# Patient Record
Sex: MALE | Race: BLACK OR AFRICAN AMERICAN | NOT HISPANIC OR LATINO | Employment: OTHER | URBAN - METROPOLITAN AREA
[De-identification: names, ages, dates, MRNs, and addresses within clinical notes are randomized per-mention and may not be internally consistent; named-entity substitution may affect disease eponyms.]

---

## 2017-01-24 ENCOUNTER — CLINICAL SUPPORT (OUTPATIENT)
Dept: SMOKING CESSATION | Facility: CLINIC | Age: 77
End: 2017-01-24
Payer: COMMERCIAL

## 2017-01-24 DIAGNOSIS — F17.200 NICOTINE DEPENDENCE: Primary | ICD-10-CM

## 2017-01-24 PROCEDURE — 99404 PREV MED CNSL INDIV APPRX 60: CPT | Mod: S$GLB,,,

## 2017-01-24 NOTE — PROGRESS NOTES
Pt seen at intake today. He currently smokes less than 3 cigs/day, but he hasn't smoked a cigarette in the past week .Patient is on Wellbutrin  mg BID per his primary MD, since December. Patient wishes to come to weekly counseling because he is anxious to stay quit.  Pt's exhaled carbon monoxide level was  0  ppm as per Smokerlyzer. Went over 15 minute wait and distracting techniques with patient.  Provided Mr Rodriguez with cinnamon toothpicks, tanglers and coloring book to help him with distracting.  Congratulated patient on his hard work and his success at quitting.  Please see Tobacco Cessation Intake Form for patient assessment and recommendations.

## 2017-01-27 ENCOUNTER — OFFICE VISIT (OUTPATIENT)
Dept: FAMILY MEDICINE | Facility: CLINIC | Age: 77
End: 2017-01-27
Payer: MEDICARE

## 2017-01-27 VITALS
RESPIRATION RATE: 12 BRPM | TEMPERATURE: 99 F | WEIGHT: 125.69 LBS | SYSTOLIC BLOOD PRESSURE: 210 MMHG | HEART RATE: 56 BPM | DIASTOLIC BLOOD PRESSURE: 86 MMHG | OXYGEN SATURATION: 98 % | BODY MASS INDEX: 19.73 KG/M2 | HEIGHT: 67 IN

## 2017-01-27 DIAGNOSIS — F32.A DEPRESSION, UNSPECIFIED DEPRESSION TYPE: ICD-10-CM

## 2017-01-27 DIAGNOSIS — F17.210 CIGARETTE NICOTINE DEPENDENCE WITHOUT COMPLICATION: ICD-10-CM

## 2017-01-27 DIAGNOSIS — G47.00 INSOMNIA, UNSPECIFIED TYPE: ICD-10-CM

## 2017-01-27 DIAGNOSIS — I70.0 ATHEROSCLEROSIS OF AORTA: ICD-10-CM

## 2017-01-27 DIAGNOSIS — F33.2 MAJOR DEPRESSIVE DISORDER, RECURRENT EPISODE, SEVERE, WITHOUT MENTION OF PSYCHOTIC BEHAVIOR: ICD-10-CM

## 2017-01-27 DIAGNOSIS — J30.9 ALLERGIC RHINITIS, UNSPECIFIED ALLERGIC RHINITIS TRIGGER, UNSPECIFIED RHINITIS SEASONALITY: Primary | ICD-10-CM

## 2017-01-27 PROCEDURE — 1157F ADVNC CARE PLAN IN RCRD: CPT | Mod: S$GLB,,, | Performed by: FAMILY MEDICINE

## 2017-01-27 PROCEDURE — 3077F SYST BP >= 140 MM HG: CPT | Mod: S$GLB,,, | Performed by: FAMILY MEDICINE

## 2017-01-27 PROCEDURE — 1160F RVW MEDS BY RX/DR IN RCRD: CPT | Mod: S$GLB,,, | Performed by: FAMILY MEDICINE

## 2017-01-27 PROCEDURE — 3079F DIAST BP 80-89 MM HG: CPT | Mod: S$GLB,,, | Performed by: FAMILY MEDICINE

## 2017-01-27 PROCEDURE — 99499 UNLISTED E&M SERVICE: CPT | Mod: S$GLB,,, | Performed by: FAMILY MEDICINE

## 2017-01-27 PROCEDURE — 99213 OFFICE O/P EST LOW 20 MIN: CPT | Mod: S$GLB,,, | Performed by: FAMILY MEDICINE

## 2017-01-27 PROCEDURE — 1159F MED LIST DOCD IN RCRD: CPT | Mod: S$GLB,,, | Performed by: FAMILY MEDICINE

## 2017-01-27 PROCEDURE — 99999 PR PBB SHADOW E&M-EST. PATIENT-LVL III: CPT | Mod: PBBFAC,,, | Performed by: FAMILY MEDICINE

## 2017-01-27 RX ORDER — MIRTAZAPINE 45 MG/1
45 TABLET, FILM COATED ORAL NIGHTLY
Qty: 90 TABLET | Refills: 3 | Status: SHIPPED | OUTPATIENT
Start: 2017-01-27 | End: 2017-02-27 | Stop reason: SDUPTHER

## 2017-01-27 RX ORDER — BUPROPION HYDROCHLORIDE 200 MG/1
200 TABLET, EXTENDED RELEASE ORAL 2 TIMES DAILY
Qty: 180 TABLET | Refills: 3 | Status: SHIPPED | OUTPATIENT
Start: 2017-01-27 | End: 2018-02-14 | Stop reason: SDUPTHER

## 2017-01-27 RX ORDER — MIRTAZAPINE 30 MG/1
TABLET, FILM COATED ORAL
COMMUNITY
Start: 2017-01-26 | End: 2017-01-27 | Stop reason: SDUPTHER

## 2017-01-27 NOTE — PROGRESS NOTES
Subjective:       Patient ID: Otis Rodriguez is a 76 y.o. male.    Chief Complaint: Depression (f/u)    HPI:  Here for follow up chronic depression, insomnia and allergies. Chronic watery eyes and runny nose.  Symptoms unchanged. Using Flonase.    Review of Systems   Constitutional: Negative for unexpected weight change.   Psychiatric/Behavioral: Positive for dysphoric mood.       Objective:      Physical Exam   Constitutional: He appears well-developed and well-nourished.   HENT:   Nose: Nose normal.   Eyes: Conjunctivae are normal.   Neurological: He is alert.         Assessment:       1. Allergic rhinitis, unspecified allergic rhinitis trigger, unspecified rhinitis seasonality    2. Atherosclerosis of aorta    3. Major depressive disorder, recurrent episode, severe, without mention of psychotic behavior        Plan:       Allergic rhinitis, unspecified allergic rhinitis trigger, unspecified rhinitis seasonality  Zyrtec or Claritin 10mg 1 tablet daily for allergies    Atherosclerosis of aorta  No chest pain    Major depressive disorder, recurrent episode, severe, without mention of psychotic behavior    mood unchanged.  Increase Wellbutrin  mg twice daily      No Follow-up on file.

## 2017-01-27 NOTE — MR AVS SNAPSHOT
[facility] [address]  [place] [address]  Phone: [phone]  Fax: [phone]                  Otis Rodriguez   2017 3:40 PM   Office Visit    Description:  Male : 1940   Provider:  Selma Miles MD   Department:  [facility]           Reason for Visit     Depression           Diagnoses this Visit        Comments    Allergic rhinitis, unspecified allergic rhinitis trigger, unspecified rhinitis seasonality    -  Primary     Atherosclerosis of aorta         Major depressive disorder, recurrent episode, severe, without mention of psychotic behavior         Insomnia, unspecified type         Depression, unspecified depression type         Cigarette nicotine dependence without complication                To Do List           Future Appointments        Provider Department Dept Phone    2017 2:00 PM Tiff Mullins MD [facility] [phone]      Goals (5 Years of Data)     None       These Medications        Disp Refills Start End    mirtazapine (REMERON) 45 MG tablet 90 tablet 3 2017     Take 1 tablet (45 mg total) by mouth every evening. - Oral    Pharmacy: [facility]/pharmacy #[id number] [facility] [address] [place] [address] Ph #: [phone]       buPROPion (WELLBUTRIN SR) 200 MG TbSR 180 tablet 3 2017     Take 1 tablet (200 mg total) by mouth 2 (two) times daily. - Oral    Pharmacy: [facility]/pharmacy #[id number] [address] Ph #: [phone]         [facility] On [facility]     [facility] On Call Nurse Care Line -  Assistance  Registered nurses in the [facility] On Call Center provide clinical advisement, health education, appointment booking, and other advisory services.  Call for this free service at [phone].             Medications           START taking these NEW medications        Refills    mirtazapine (REMERON) 45 MG tablet 3    Sig: Take 1 tablet (45 mg total) by mouth every evening.     Class: Print    Route: Oral      CHANGE how you are taking these medications     Start Taking Instead of    buPROPion (WELLBUTRIN SR) 200 MG TbSR buPROPion (WELLBUTRIN SR) 150 MG TBSR 12 hr tablet    Dosage:  Take 1 tablet (200 mg total) by mouth 2 (two) times daily. Dosage:  Take 1 tablet daily x 3 days, then 1 tablet twice daily    Reason for Change:  Reorder       STOP taking these medications     quetiapine (SEROQUEL) 100 MG Tab Take 1 tablet (100 mg total) by mouth nightly.           Verify that the below list of medications is an accurate representation of the medications you are currently taking.  If none reported, the list may be blank. If incorrect, please contact your healthcare provider. Carry this list with you in case of emergency.           Current Medications     amlodipine (NORVASC) 5 MG tablet Take 1 tablet (5 mg total) by mouth once daily.    azelastine (OPTIVAR) 0.05 % ophthalmic solution Place 1 drop into both eyes 2 (two) times daily.    buPROPion (WELLBUTRIN SR) 200 MG TbSR Take 1 tablet (200 mg total) by mouth 2 (two) times daily.    cetirizine (ZYRTEC) 10 MG tablet Take 1 tablet (10 mg total) by mouth once daily.    donepezil (ARICEPT) 10 MG tablet Take 1 tablet (10 mg total) by mouth every evening.    fluticasone (FLONASE) 50 mcg/actuation nasal spray 2 sprays by Each Nare route once daily.    hydrALAZINE (APRESOLINE) 25 MG tablet Take 1 tablet (25 mg total) by mouth 3 (three) times daily.    metoprolol succinate (TOPROL-XL) 50 MG 24 hr tablet TAKE 1 TABLET BY MOUTH TWICE A DAY    mirtazapine (REMERON) 45 MG tablet Take 1 tablet (45 mg total) by mouth every evening.    naproxen (NAPROSYN) 500 MG tablet Take 1 tablet (500 mg total) by mouth 2 (two) times daily with meals.    nitroGLYCERIN 0.4 MG/DOSE TL SPRY (NITROLINGUAL) 400 mcg/spray spray Place 2 sprays under the tongue every 5 (five) minutes as needed for Chest pain.    polyethylene glycol (COLYTE) 240-22.72-6.72 -5.84 gram SolR Take  "4,000 mLs (4 L total) by mouth as directed.    polyethylene glycol (COLYTE) 240-22.72-6.72 -5.84 gram SolR Take 4,000 mLs (4 L total) by mouth as directed.    SAW/VIT E/SOD GABBY/LYC/BETA/PYG (PROSTATE HEALTH ORAL) Take by mouth.           Clinical Reference Information           Vital Signs - Last Recorded  Most recent update: 1/27/2017  3:14 PM by Dayna Best MA    BP Pulse Temp Resp Ht Wt    (!) 210/86 (BP Location: Right arm, Patient Position: Sitting, BP Method: Manual) (!) 56 98.8 °F (37.1 °C) (Oral) 12 5' 7" (1.702 m) 57 kg (125 lb 10.6 oz)    SpO2 BMI             98% 19.68 kg/m2         Blood Pressure          Most Recent Value    BP  (!)  210/86      Allergies as of 1/27/2017     No Known Allergies      Immunizations Administered on Date of Encounter - 1/27/2017     None      Instructions    Zyrtec or Claritin 10mg 1 tablet daily for allergies       "

## 2017-01-31 ENCOUNTER — CLINICAL SUPPORT (OUTPATIENT)
Dept: SMOKING CESSATION | Facility: CLINIC | Age: 77
End: 2017-01-31
Payer: COMMERCIAL

## 2017-01-31 DIAGNOSIS — F17.200 NICOTINE DEPENDENCE: Primary | ICD-10-CM

## 2017-01-31 PROCEDURE — 99999 PR PBB SHADOW E&M-EST. PATIENT-LVL I: CPT | Mod: PBBFAC,,,

## 2017-01-31 PROCEDURE — 99402 PREV MED CNSL INDIV APPRX 30: CPT | Mod: S$GLB,,,

## 2017-01-31 NOTE — PROGRESS NOTES
Individual Follow-Up Form    1/31/2017    Quit Date: /    Clinical Status of Patient: Outpatient    Length of Service: 30 minutes    Continuing Medication: yes  Wellbutrin    Other Medications: none     Target Symptoms: Withdrawal and medication side effects. The following were  rated moderate (3) to severe (4) on TCRS:  · Moderate (3): none  · Severe (4): none    Comments: Patient is tobacco free. Reviewed strategies, cues, and triggers. Introduced the negative impact of tobacco on health, the health advantages of discontinuing the use of tobacco,  improved health changes after a quit, withdrawal issues to expect from nicotine and habit, and ways to achieve the goal of a quit .Reviewed coping strategies/habitual behavior/relapse prevention with patient. Exhaled carbon monoxide was 0 per smokerlyzer. Congratulated patient and gave him his quit coin. He stated he wished to return next week. Patient said the [employer] toothpicks helped, he was given another pack.     Diagnosis: F17.200    Next Visit: 2 weeks/

## 2017-01-31 NOTE — Clinical Note
Patient is tobacco free. Reviewed strategies, cues, and triggers. Introduced the negative impact of tobacco on health, the health advantages of discontinuing the use of tobacco,  improved health changes after a quit, withdrawal issues to expect from nicotine and habit, and ways to achieve the goal of a quit .Reviewed coping strategies/habitual behavior/relapse prevention with patient. Exhaled carbon monoxide was 0 per smokerlyzer. Congratulated patient and gave him his quit coin. Patient said the [employer] toothpicks helped, he was given another pack. He stated he wished to return next week.

## 2017-02-27 DIAGNOSIS — G47.00 INSOMNIA: ICD-10-CM

## 2017-02-27 DIAGNOSIS — G47.00 INSOMNIA, UNSPECIFIED TYPE: ICD-10-CM

## 2017-02-27 RX ORDER — MIRTAZAPINE 45 MG/1
45 TABLET, FILM COATED ORAL NIGHTLY
Qty: 90 TABLET | Refills: 1 | Status: SHIPPED | OUTPATIENT
Start: 2017-02-27 | End: 2018-11-26

## 2017-02-27 RX ORDER — MIRTAZAPINE 30 MG/1
TABLET, FILM COATED ORAL
Qty: 30 TABLET | Refills: 9 | OUTPATIENT
Start: 2017-02-27

## 2017-03-27 ENCOUNTER — OFFICE VISIT (OUTPATIENT)
Dept: FAMILY MEDICINE | Facility: CLINIC | Age: 77
End: 2017-03-27
Payer: MEDICARE

## 2017-03-27 VITALS
TEMPERATURE: 98 F | SYSTOLIC BLOOD PRESSURE: 158 MMHG | HEIGHT: 67 IN | OXYGEN SATURATION: 97 % | DIASTOLIC BLOOD PRESSURE: 82 MMHG | HEART RATE: 78 BPM | BODY MASS INDEX: 19.66 KG/M2 | RESPIRATION RATE: 12 BRPM | WEIGHT: 125.25 LBS

## 2017-03-27 DIAGNOSIS — S20.212A CONTUSION OF RIB, LEFT, INITIAL ENCOUNTER: ICD-10-CM

## 2017-03-27 DIAGNOSIS — H66.93 BILATERAL OTITIS MEDIA, UNSPECIFIED CHRONICITY, UNSPECIFIED OTITIS MEDIA TYPE: Primary | ICD-10-CM

## 2017-03-27 PROCEDURE — 99999 PR PBB SHADOW E&M-EST. PATIENT-LVL III: CPT | Mod: PBBFAC,,, | Performed by: FAMILY MEDICINE

## 2017-03-27 PROCEDURE — 1160F RVW MEDS BY RX/DR IN RCRD: CPT | Mod: S$GLB,,, | Performed by: FAMILY MEDICINE

## 2017-03-27 PROCEDURE — 1157F ADVNC CARE PLAN IN RCRD: CPT | Mod: S$GLB,,, | Performed by: FAMILY MEDICINE

## 2017-03-27 PROCEDURE — 99213 OFFICE O/P EST LOW 20 MIN: CPT | Mod: S$GLB,,, | Performed by: FAMILY MEDICINE

## 2017-03-27 PROCEDURE — 3079F DIAST BP 80-89 MM HG: CPT | Mod: S$GLB,,, | Performed by: FAMILY MEDICINE

## 2017-03-27 PROCEDURE — 3077F SYST BP >= 140 MM HG: CPT | Mod: S$GLB,,, | Performed by: FAMILY MEDICINE

## 2017-03-27 PROCEDURE — 1159F MED LIST DOCD IN RCRD: CPT | Mod: S$GLB,,, | Performed by: FAMILY MEDICINE

## 2017-03-27 RX ORDER — AMOXICILLIN 875 MG/1
875 TABLET, FILM COATED ORAL 2 TIMES DAILY
Qty: 20 TABLET | Refills: 0 | Status: SHIPPED | OUTPATIENT
Start: 2017-03-27 | End: 2017-04-06

## 2017-03-27 RX ORDER — QUETIAPINE FUMARATE 50 MG/1
50 TABLET, FILM COATED ORAL NIGHTLY
Refills: 3 | COMMUNITY
Start: 2017-02-27 | End: 2018-06-25

## 2017-03-27 NOTE — PROGRESS NOTES
Subjective:       Patient ID: Otis Rodriguez is a 76 y.o. male.    Chief Complaint: Chest Pain (patient fell a month ago hurt left sided chest and elbow, still with allergies)    HPI:  Reports bilateral ear pain x 2 days. Reports chronic runny nose and watery eyes.  Also reports he slipped and fell while getting out of tub 1 month ago injuryng left side of rib cage.    Review of Systems   Constitutional: Negative for fever.   HENT: Positive for rhinorrhea.    Eyes: Positive for discharge.       Objective:      Physical Exam   Constitutional: He appears well-developed and well-nourished.   HENT:   TM's red bilaterally   Pulmonary/Chest: Effort normal. No respiratory distress.   No tenderness, palpable deformity or bruising of left rib cage         Assessment:       1. Bilateral otitis media, unspecified chronicity, unspecified otitis media type    2. Contusion of rib, left, initial encounter        Plan:       Bilateral otitis media, unspecified chronicity, unspecified otitis media type  -     amoxicillin (AMOXIL) 875 MG tablet; Take 1 tablet (875 mg total) by mouth 2 (two) times daily.  Dispense: 20 tablet; Refill: 0    Contusion of rib, left, initial encounter  Patient reassured          No Follow-up on file.

## 2017-03-27 NOTE — MR AVS SNAPSHOT
[facility]  [address]  [place] [address]  Phone: [phone]  Fax: [phone]                  Otis Rodriguez   3/27/2017 3:00 PM   Office Visit    Description:  Male : 1940   Provider:  Selma Miles MD   Department:  [facility]           Reason for Visit     Chest Pain           Diagnoses this Visit        Comments    Bilateral otitis media, unspecified chronicity, unspecified otitis media type    -  Primary     Contusion of rib, left, initial encounter                To Do List           Goals (5 Years of Data)     None       These Medications        Disp Refills Start End    amoxicillin (AMOXIL) 875 MG tablet 20 tablet 0 3/27/2017 2017    Take 1 tablet (875 mg total) by mouth 2 (two) times daily. - Oral    Pharmacy: [facility]/pharmacy #[id number] - [place], [place]  [address] [facility] #: [phone]         [facility] On Call     [facility] On Call Nurse Care Line -  Assistance  Registered nurses in the [facility] On Call Center provide clinical advisement, health education, appointment booking, and other advisory services.  Call for this free service at [phone].             Medications           START taking these NEW medications        Refills    amoxicillin (AMOXIL) 875 MG tablet 0    Sig: Take 1 tablet (875 mg total) by mouth 2 (two) times daily.    Class: Normal    Route: Oral           Verify that the below list of medications is an accurate representation of the medications you are currently taking.  If none reported, the list may be blank. If incorrect, please contact your healthcare provider. Carry this list with you in case of emergency.           Current Medications     amlodipine (NORVASC) 5 MG tablet Take 1 tablet (5 mg total) by mouth once daily.    amoxicillin (AMOXIL) 875 MG tablet Take 1 tablet (875 mg total) by mouth 2 (two) times daily.    azelastine (OPTIVAR) 0.05 % ophthalmic solution Place 1 drop into both eyes 2  "(two) times daily.    buPROPion (WELLBUTRIN SR) 200 MG TbSR Take 1 tablet (200 mg total) by mouth 2 (two) times daily.    cetirizine (ZYRTEC) 10 MG tablet Take 1 tablet (10 mg total) by mouth once daily.    donepezil (ARICEPT) 10 MG tablet Take 1 tablet (10 mg total) by mouth every evening.    fluticasone (FLONASE) 50 mcg/actuation nasal spray 2 sprays by Each Nare route once daily.    hydrALAZINE (APRESOLINE) 25 MG tablet Take 1 tablet (25 mg total) by mouth 3 (three) times daily.    metoprolol succinate (TOPROL-XL) 50 MG 24 hr tablet TAKE 1 TABLET BY MOUTH TWICE A DAY    mirtazapine (REMERON) 45 MG tablet Take 1 tablet (45 mg total) by mouth every evening.    naproxen (NAPROSYN) 500 MG tablet Take 1 tablet (500 mg total) by mouth 2 (two) times daily with meals.    nitroGLYCERIN 0.4 MG/DOSE TL SPRY (NITROLINGUAL) 400 mcg/spray spray Place 2 sprays under the tongue every 5 (five) minutes as needed for Chest pain.    polyethylene glycol (COLYTE) 240-22.72-6.72 -5.84 gram SolR Take 4,000 mLs (4 L total) by mouth as directed.    polyethylene glycol (COLYTE) 240-22.72-6.72 -5.84 gram SolR Take 4,000 mLs (4 L total) by mouth as directed.    quetiapine (SEROQUEL) 50 MG tablet Take 50 mg by mouth every evening.    SAW/VIT E/SOD GABBY/LYC/BETA/PYG (PROSTATE HEALTH ORAL) Take by mouth.           Clinical Reference Information           Your Vitals Were     BP Pulse Temp Resp Height Weight    158/82 (BP Location: Left arm, Patient Position: Sitting, BP Method: Manual) 78 98.1 °F (36.7 °C) (Oral) 12 5' 7" (1.702 m) 56.8 kg (125 lb 3.5 oz)    SpO2 BMI             97% 19.61 kg/m2         Blood Pressure          Most Recent Value    BP  (!)  158/82      Allergies as of 3/27/2017     No Known Allergies      Immunizations Administered on Date of Encounter - 3/27/2017     None      Language Assistance Services     ATTENTION: Language assistance services are available, free of charge. Please call [phone].      ATENCIÓN: Si wyatt " español, tiene a greenberg disposición servicios gratuitos de asistencia lingüística. Llame al [phone].     CHÚ Ý: N?u b?n nói Ti?ng Vi?t, có các d?ch v? h? tr? ngôn ng? mi?n phí dành cho b?n. G?i s? [phone].         [facility] complies with applicable Federal civil rights laws and does not discriminate on the basis of race, color, national origin, age, disability, or sex.

## 2017-05-02 ENCOUNTER — CLINICAL SUPPORT (OUTPATIENT)
Dept: SMOKING CESSATION | Facility: CLINIC | Age: 77
End: 2017-05-02
Payer: COMMERCIAL

## 2017-05-02 DIAGNOSIS — F17.200 NICOTINE DEPENDENCE: Primary | ICD-10-CM

## 2017-05-02 PROCEDURE — 99407 BEHAV CHNG SMOKING > 10 MIN: CPT | Mod: S$GLB,,,

## 2017-05-02 NOTE — PROGRESS NOTES
Phone follow up today. Patient is tobacco free.  No adverse effects/mental changes noted at this time. Reviewed coping strategies/habitual behavior/relapse prevention with patient.  Will see pt back in office in 2 weeks. Congratulated patient on his  success .

## 2017-05-05 DIAGNOSIS — I25.10 CORONARY ARTERY DISEASE INVOLVING NATIVE CORONARY ARTERY WITHOUT ANGINA PECTORIS, UNSPECIFIED WHETHER NATIVE OR TRANSPLANTED HEART: ICD-10-CM

## 2017-05-05 RX ORDER — METOPROLOL SUCCINATE 50 MG/1
TABLET, EXTENDED RELEASE ORAL
Qty: 60 TABLET | Refills: 6 | Status: SHIPPED | OUTPATIENT
Start: 2017-05-05 | End: 2018-05-25 | Stop reason: SDUPTHER

## 2017-05-24 ENCOUNTER — TELEPHONE (OUTPATIENT)
Dept: SMOKING CESSATION | Facility: CLINIC | Age: 77
End: 2017-05-24

## 2017-08-02 ENCOUNTER — TELEPHONE (OUTPATIENT)
Dept: FAMILY MEDICINE | Facility: CLINIC | Age: 77
End: 2017-08-02

## 2017-08-02 NOTE — TELEPHONE ENCOUNTER
Patient says [phone] is the number and he does not know the names of the Drs.      Patient states he went to a DrRocío Visit in [facility]. A cxr  Showed asbestos. He will call back with the phone number.        ----- Message from India Gutierrez sent at 8/2/2017  8:44 AM CDT -----  Patient states he is calling regards to x-rays. Please call at [phone] thank you!      Patient will sign a release and it needs to be faxed [phone]

## 2017-08-21 NOTE — TELEPHONE ENCOUNTER
We are unable to get a copy I tried it's about a lawsuit ,  he called and is now in the lawsuit as seen on tv.

## 2017-08-30 ENCOUNTER — CLINICAL SUPPORT (OUTPATIENT)
Dept: SMOKING CESSATION | Facility: CLINIC | Age: 77
End: 2017-08-30
Payer: COMMERCIAL

## 2017-08-30 DIAGNOSIS — F17.200 NICOTINE DEPENDENCE: Primary | ICD-10-CM

## 2017-08-30 PROCEDURE — 99407 BEHAV CHNG SMOKING > 10 MIN: CPT | Mod: S$GLB,,,

## 2017-09-12 ENCOUNTER — OFFICE VISIT (OUTPATIENT)
Dept: FAMILY MEDICINE | Facility: CLINIC | Age: 77
End: 2017-09-12
Payer: MEDICARE

## 2017-09-12 VITALS
TEMPERATURE: 98 F | RESPIRATION RATE: 20 BRPM | OXYGEN SATURATION: 99 % | SYSTOLIC BLOOD PRESSURE: 132 MMHG | DIASTOLIC BLOOD PRESSURE: 58 MMHG | BODY MASS INDEX: 19.55 KG/M2 | HEIGHT: 67 IN | HEART RATE: 63 BPM | WEIGHT: 124.56 LBS

## 2017-09-12 DIAGNOSIS — Z23 NEED FOR PNEUMOCOCCAL VACCINATION: ICD-10-CM

## 2017-09-12 DIAGNOSIS — I21.4 NSTEMI (NON-ST ELEVATED MYOCARDIAL INFARCTION): Primary | ICD-10-CM

## 2017-09-12 DIAGNOSIS — I10 BENIGN ESSENTIAL HYPERTENSION: ICD-10-CM

## 2017-09-12 PROCEDURE — 1126F AMNT PAIN NOTED NONE PRSNT: CPT | Mod: S$GLB,,, | Performed by: PHYSICIAN ASSISTANT

## 2017-09-12 PROCEDURE — G0009 ADMIN PNEUMOCOCCAL VACCINE: HCPCS | Mod: S$GLB,,, | Performed by: PHYSICIAN ASSISTANT

## 2017-09-12 PROCEDURE — 3075F SYST BP GE 130 - 139MM HG: CPT | Mod: S$GLB,,, | Performed by: PHYSICIAN ASSISTANT

## 2017-09-12 PROCEDURE — 90732 PPSV23 VACC 2 YRS+ SUBQ/IM: CPT | Mod: S$GLB,,, | Performed by: PHYSICIAN ASSISTANT

## 2017-09-12 PROCEDURE — 99999 PR PBB SHADOW E&M-EST. PATIENT-LVL V: CPT | Mod: PBBFAC,,, | Performed by: PHYSICIAN ASSISTANT

## 2017-09-12 PROCEDURE — 3078F DIAST BP <80 MM HG: CPT | Mod: S$GLB,,, | Performed by: PHYSICIAN ASSISTANT

## 2017-09-12 PROCEDURE — 3008F BODY MASS INDEX DOCD: CPT | Mod: S$GLB,,, | Performed by: PHYSICIAN ASSISTANT

## 2017-09-12 PROCEDURE — 1159F MED LIST DOCD IN RCRD: CPT | Mod: S$GLB,,, | Performed by: PHYSICIAN ASSISTANT

## 2017-09-12 PROCEDURE — 99499 UNLISTED E&M SERVICE: CPT | Mod: S$PBB,,, | Performed by: PHYSICIAN ASSISTANT

## 2017-09-12 PROCEDURE — 99214 OFFICE O/P EST MOD 30 MIN: CPT | Mod: S$GLB,,, | Performed by: PHYSICIAN ASSISTANT

## 2017-09-12 RX ORDER — FLUTICASONE PROPIONATE 50 MCG
1 SPRAY, SUSPENSION (ML) NASAL
COMMUNITY
End: 2018-09-26 | Stop reason: SDUPTHER

## 2017-09-12 RX ORDER — LISINOPRIL 10 MG/1
10 TABLET ORAL DAILY
Refills: 0 | COMMUNITY
Start: 2017-08-18 | End: 2017-12-20 | Stop reason: SDUPTHER

## 2017-09-12 RX ORDER — TEMAZEPAM 7.5 MG/1
7.5 CAPSULE ORAL
COMMUNITY
Start: 2013-06-03 | End: 2017-10-05

## 2017-09-12 RX ORDER — ASPIRIN 325 MG
325 TABLET, DELAYED RELEASE (ENTERIC COATED) ORAL DAILY
Qty: 90 TABLET | Refills: 0 | Status: SHIPPED | OUTPATIENT
Start: 2017-09-12 | End: 2017-12-15 | Stop reason: SDUPTHER

## 2017-09-12 RX ORDER — ATORVASTATIN CALCIUM 40 MG/1
40 TABLET, FILM COATED ORAL NIGHTLY
Refills: 0 | COMMUNITY
Start: 2017-08-18 | End: 2017-12-20 | Stop reason: SDUPTHER

## 2017-09-12 NOTE — PROGRESS NOTES
Subjective:       Patient ID: Otis Rodriguez is a 77 y.o. male.    Chief Complaint: Hypertension (F/U)    HPI: 76 yo male with dementia presents for hospital follow up for NSTEMI. Patient is a very poor historian. He did bring some records from the hospital. Pt went to [facility] after having a headache. Pressure was elevated, troponin positive. Pt states he underwent right heart cath. He states no stents were placed. He saw a cardiologist at [facility] this week. He states he is to return in 2 months. Since discharge, pt doing well. Denies chest pain, SOB, headaches. He is not on aspirin.    Review of Systems   Eyes: Negative for visual disturbance.   Respiratory: Negative for shortness of breath.    Cardiovascular: Negative for chest pain.   Neurological: Negative for headaches.       Objective:      Physical Exam   Constitutional: He appears well-developed and well-nourished. No distress.   HENT:   Head: Normocephalic and atraumatic.   Cardiovascular: Normal rate and regular rhythm.    Pulmonary/Chest: Effort normal and breath sounds normal.   Skin: He is not diaphoretic.   Psychiatric: Cognition and memory are impaired.   Vitals reviewed.      Assessment:       1. NSTEMI (non-ST elevated myocardial infarction)    2. Need for pneumococcal vaccination    3. Benign essential hypertension        Plan:         Otis was seen today for hypertension.    Diagnoses and all orders for this visit:    NSTEMI (non-ST elevated myocardial infarction)  -     aspirin (ECOTRIN) 325 MG EC tablet; Take 1 tablet (325 mg total) by mouth once daily.  -     Continue all discharge medication. Follow up with cardiology as directed   -     Labs from hospitalization were reviewed by me and scanned into chart    Need for pneumococcal vaccination  -     Pneumococcal Polysaccharide Vaccine (23 Valent) (SQ/IM)    Benign essential hypertension  -     Ambulatory referral to Optometry  -    Controlled on current medications, I  personally reviewed patients medications in his bag

## 2017-09-12 NOTE — PROGRESS NOTES
COLONOSCOPY EVERY 10 YEARS- referral pending for today   I      Pneumococcal Vaccine (65+) ok to get today

## 2017-09-12 NOTE — PROGRESS NOTES
..Patient given pneumovax 23 vaccine left deltoid, tolerated well, no complaints, no reaction noted. VIS given

## 2017-09-23 DIAGNOSIS — I25.10 CORONARY ARTERY DISEASE INVOLVING NATIVE CORONARY ARTERY WITHOUT ANGINA PECTORIS, UNSPECIFIED WHETHER NATIVE OR TRANSPLANTED HEART: ICD-10-CM

## 2017-09-25 RX ORDER — NITROGLYCERIN 400 UG/1
2 SPRAY ORAL EVERY 5 MIN PRN
Qty: 12 G | Refills: 2 | Status: SHIPPED | OUTPATIENT
Start: 2017-09-25 | End: 2018-07-29 | Stop reason: SDUPTHER

## 2017-10-05 ENCOUNTER — OFFICE VISIT (OUTPATIENT)
Dept: FAMILY MEDICINE | Facility: CLINIC | Age: 77
End: 2017-10-05
Payer: MEDICARE

## 2017-10-05 VITALS
TEMPERATURE: 99 F | SYSTOLIC BLOOD PRESSURE: 148 MMHG | WEIGHT: 122.38 LBS | HEIGHT: 67 IN | HEART RATE: 58 BPM | OXYGEN SATURATION: 99 % | RESPIRATION RATE: 16 BRPM | BODY MASS INDEX: 19.21 KG/M2 | DIASTOLIC BLOOD PRESSURE: 62 MMHG

## 2017-10-05 DIAGNOSIS — H10.13 ALLERGIC CONJUNCTIVITIS OF BOTH EYES: ICD-10-CM

## 2017-10-05 DIAGNOSIS — J30.9 ACUTE ALLERGIC RHINITIS, UNSPECIFIED SEASONALITY, UNSPECIFIED TRIGGER: Primary | ICD-10-CM

## 2017-10-05 PROCEDURE — 99214 OFFICE O/P EST MOD 30 MIN: CPT | Mod: S$GLB,,, | Performed by: FAMILY MEDICINE

## 2017-10-05 PROCEDURE — 99999 PR PBB SHADOW E&M-EST. PATIENT-LVL III: CPT | Mod: PBBFAC,,, | Performed by: FAMILY MEDICINE

## 2017-10-05 RX ORDER — KETOTIFEN FUMARATE 0.35 MG/ML
1 SOLUTION/ DROPS OPHTHALMIC 2 TIMES DAILY
Qty: 10 ML | Refills: 0 | Status: SHIPPED | OUTPATIENT
Start: 2017-10-05 | End: 2018-11-26

## 2017-10-05 RX ORDER — LEVOCETIRIZINE DIHYDROCHLORIDE 5 MG/1
5 TABLET, FILM COATED ORAL NIGHTLY
Qty: 30 TABLET | Refills: 11 | Status: SHIPPED | OUTPATIENT
Start: 2017-10-05 | End: 2017-12-20 | Stop reason: SDUPTHER

## 2017-10-05 RX ORDER — FLUTICASONE PROPIONATE 50 MCG
1 SPRAY, SUSPENSION (ML) NASAL 2 TIMES DAILY
Qty: 1 BOTTLE | Refills: 3 | Status: SHIPPED | OUTPATIENT
Start: 2017-10-05 | End: 2018-02-14 | Stop reason: SDUPTHER

## 2017-10-05 NOTE — PROGRESS NOTES
Subjective:       Patient ID: Otis Rodriguez is a 77 y.o. male.    Chief Complaint: Sinus Problem (eyes and nose running for 3 weeks)    Sinus Problem          Sinus Problem - Pt has intermittent eye and nose running x 3 weeks that is worse since the onset.  No medication attempted for these.   Pt has had similar sxs in the past that self resolve.           Current Outpatient Prescriptions on File Prior to Visit   Medication Sig Dispense Refill    amlodipine (NORVASC) 5 MG tablet Take 1 tablet (5 mg total) by mouth once daily. 90 tablet 1    aspirin (ECOTRIN) 325 MG EC tablet Take 1 tablet (325 mg total) by mouth once daily. 90 tablet 0    buPROPion (WELLBUTRIN SR) 200 MG TbSR Take 1 tablet (200 mg total) by mouth 2 (two) times daily. 180 tablet 3    donepezil (ARICEPT) 10 MG tablet Take 1 tablet (10 mg total) by mouth every evening. 90 tablet 3    hydrALAZINE (APRESOLINE) 25 MG tablet Take 1 tablet (25 mg total) by mouth 3 (three) times daily. 90 tablet 8    metoprolol succinate (TOPROL-XL) 50 MG 24 hr tablet TAKE 1 TABLET BY MOUTH TWICE A DAY 60 tablet 6    nitroGLYCERIN 0.4 MG/DOSE TL SPRY (NITROLINGUAL) 400 mcg/spray spray PLACE 2 SPRAYS UNDER THE TONGUE EVERY 5 (FIVE) MINUTES AS NEEDED FOR CHEST PAIN. 12 g 2    quetiapine (SEROQUEL) 50 MG tablet Take 50 mg by mouth every evening.  3    atorvastatin (LIPITOR) 40 MG tablet Take 40 mg by mouth every evening.  0    azelastine (OPTIVAR) 0.05 % ophthalmic solution Place 1 drop into both eyes 2 (two) times daily. 6 mL 11    fluticasone (FLONASE) 50 mcg/actuation nasal spray 1 spray by Each Nare route as needed for Rhinitis.      lisinopril 10 MG tablet Take 10 mg by mouth once daily.  0    mirtazapine (REMERON) 45 MG tablet Take 1 tablet (45 mg total) by mouth every evening. 90 tablet 1    temazepam (RESTORIL) 7.5 MG Cap Take 7.5 mg by mouth.       No current facility-administered medications on file prior to visit.        Past Medical History:  "  Diagnosis Date    CAD (coronary artery disease) 1/12/2015    Cerebrovascular disease 1/12/2015    Depression     Essential hypertension, benign 1/12/2015    Other and unspecified hyperlipidemia 1/12/2015       Family History   Problem Relation Age of Onset    Cancer Father     Alcohol abuse Brother     Kidney disease Brother         reports that he quit smoking about 8 months ago. His smoking use included Cigarettes. He smoked 0.50 packs per day. He has never used smokeless tobacco. He reports that he uses drugs, including Marijuana and "Crack" cocaine. He reports that he does not drink alcohol.    Review of Systems   Constitutional: Negative for fever.   Gastrointestinal: Negative for nausea, rectal pain and vomiting.   Musculoskeletal: Negative for arthralgias and myalgias.       Objective:     Vitals:    10/05/17 1135   BP: (!) 148/62   Pulse: (!) 58   Resp: 16   Temp: 98.8 °F (37.1 °C)        Physical Exam   Constitutional: He is oriented to person, place, and time. He appears well-developed and well-nourished. No distress.   HENT:   Head: Normocephalic and atraumatic.   Right Ear: No foreign bodies. No middle ear effusion. No hemotympanum.   Left Ear: No foreign bodies.  No middle ear effusion. No hemotympanum.   Nose: Mucosal edema and rhinorrhea present.   Mouth/Throat: No oropharyngeal exudate, posterior oropharyngeal edema or posterior oropharyngeal erythema.   PND   Eyes: Conjunctivae and EOM are normal. Right eye exhibits discharge (watery). Right eye exhibits no chemosis and no exudate. No foreign body present in the right eye. Left eye exhibits discharge. Left eye exhibits no chemosis and no exudate. No foreign body present in the left eye.   Arcus senilis   Neck: Neck supple. No tracheal deviation present. No thyromegaly present.   Cardiovascular: Normal rate, regular rhythm and normal heart sounds.  Exam reveals no gallop and no friction rub.    No murmur heard.  Pulmonary/Chest: Effort " normal and breath sounds normal. No respiratory distress. He has no wheezes.   Lymphadenopathy:     He has no cervical adenopathy.   Neurological: He is alert and oriented to person, place, and time.   Skin: He is not diaphoretic.   Psychiatric: He has a normal mood and affect.   Vitals reviewed.      Assessment:       1. Acute allergic rhinitis, unspecified seasonality, unspecified trigger    2. Allergic conjunctivitis of both eyes        Plan:       Otis was seen today for sinus problem.    Diagnoses and all orders for this visit:    Acute allergic rhinitis, unspecified seasonality, unspecified trigger  -     fluticasone (FLONASE) 50 mcg/actuation nasal spray; 1 spray by Each Nare route 2 (two) times daily.  -     levocetirizine (XYZAL) 5 MG tablet; Take 1 tablet (5 mg total) by mouth every evening.  -     ketotifen (ALLERGY EYE, KETOTIFEN,) 0.025 % (0.035 %) ophthalmic solution; Place 1 drop into both eyes 2 (two) times daily.    Patient's symptoms strongly suggest acute allergic symptoms. Flonase recommended twice a day levo cetirizine advised for p.m. use and ketotifen eyedrops recommended 2 times per day.    Patient to follow-up if symptoms worsen or change.    Allergic conjunctivitis of both eyes    See above        Return in about 2 weeks (around 10/19/2017) for sleep problems.        Pt verbalized understanding and agreed with our plan.

## 2017-10-12 ENCOUNTER — TELEPHONE (OUTPATIENT)
Dept: FAMILY MEDICINE | Facility: CLINIC | Age: 77
End: 2017-10-12

## 2017-10-12 NOTE — TELEPHONE ENCOUNTER
----- Message from Lamont Hilton sent at 10/12/2017 12:11 PM CDT -----  Contact: Self/[phone]  Patient is requesting a prescription for Cialis. Thank you.                  Patient is requesting a script for Cialis, please advise

## 2017-10-31 ENCOUNTER — CLINICAL SUPPORT (OUTPATIENT)
Dept: SMOKING CESSATION | Facility: CLINIC | Age: 77
End: 2017-10-31
Payer: COMMERCIAL

## 2017-10-31 DIAGNOSIS — F17.200 NICOTINE DEPENDENCE: Primary | ICD-10-CM

## 2017-10-31 PROCEDURE — 99404 PREV MED CNSL INDIV APPRX 60: CPT | Mod: S$GLB,,,

## 2017-10-31 PROCEDURE — 99999 PR PBB SHADOW E&M-EST. PATIENT-LVL I: CPT | Mod: PBBFAC,,,

## 2017-10-31 RX ORDER — DM/P-EPHED/ACETAMINOPH/DOXYLAM 30-7.5/3
2 LIQUID (ML) ORAL
Qty: 108 LOZENGE | Refills: 0 | Status: SHIPPED | OUTPATIENT
Start: 2017-10-31 | End: 2019-02-26

## 2017-11-08 ENCOUNTER — TELEPHONE (OUTPATIENT)
Dept: FAMILY MEDICINE | Facility: CLINIC | Age: 77
End: 2017-11-08

## 2017-11-08 NOTE — TELEPHONE ENCOUNTER
----- Message from Aylin Reynolds sent at 11/8/2017  8:47 AM CST -----  Contact: self  Pt calling to speak to a nurse regarding health. Please call [phone].

## 2017-11-17 ENCOUNTER — TELEPHONE (OUTPATIENT)
Dept: FAMILY MEDICINE | Facility: CLINIC | Age: 77
End: 2017-11-17

## 2017-11-17 NOTE — TELEPHONE ENCOUNTER
Returned call left a message to refax form.    ----- Message from Sandy Oakley sent at 11/17/2017  9:52 AM CST -----  Elda from [facility] is calling to check the status of a medication (omega-3) that was faxed over from Nov. 14. She can be reached at [phone]. Thank you!

## 2017-11-26 DIAGNOSIS — G47.00 INSOMNIA, UNSPECIFIED TYPE: ICD-10-CM

## 2017-11-26 RX ORDER — QUETIAPINE FUMARATE 50 MG/1
50 TABLET, FILM COATED ORAL NIGHTLY
Qty: 90 TABLET | Refills: 2 | Status: SHIPPED | OUTPATIENT
Start: 2017-11-26 | End: 2018-06-22 | Stop reason: SDUPTHER

## 2017-12-06 ENCOUNTER — TELEPHONE (OUTPATIENT)
Dept: FAMILY MEDICINE | Facility: CLINIC | Age: 77
End: 2017-12-06

## 2017-12-07 ENCOUNTER — TELEPHONE (OUTPATIENT)
Dept: FAMILY MEDICINE | Facility: CLINIC | Age: 77
End: 2017-12-07

## 2017-12-07 NOTE — TELEPHONE ENCOUNTER
Informed pt I do not see any messages where anyone was trying to contact him; informed him he does have OV scheduled with Dr Miles for tomorrow; pt verbalized understanding

## 2017-12-07 NOTE — TELEPHONE ENCOUNTER
----- Message from Tammy Gutierrez sent at 12/7/2017  8:59 AM CST -----  Contact: Self   Patient returned your call. Please call again at [phone].

## 2017-12-08 ENCOUNTER — TELEPHONE (OUTPATIENT)
Dept: FAMILY MEDICINE | Facility: CLINIC | Age: 77
End: 2017-12-08

## 2017-12-08 NOTE — TELEPHONE ENCOUNTER
Informed them we did receive fax and once reviewed by Dr Miles form will be faxed back to them; verbalized understanding

## 2017-12-08 NOTE — TELEPHONE ENCOUNTER
----- Message from Lamont Hilton sent at 12/8/2017 12:45 PM CST -----  Contact: Rody/[facility]/[phone]  Rody would like to know if the staff received a fax for Omega 3. Thank you.

## 2017-12-15 DIAGNOSIS — I21.4 NSTEMI (NON-ST ELEVATED MYOCARDIAL INFARCTION): ICD-10-CM

## 2017-12-15 RX ORDER — ASPIRIN 325 MG
325 TABLET, DELAYED RELEASE (ENTERIC COATED) ORAL DAILY
Qty: 90 TABLET | Refills: 3 | Status: SHIPPED | OUTPATIENT
Start: 2017-12-15 | End: 2020-02-11

## 2017-12-15 NOTE — TELEPHONE ENCOUNTER
----- Message from Sandy Oakley sent at 12/15/2017  1:36 PM CST -----  [facility] states that pt request to get a medication prescribed: omega 3. Can be reached at [phone]. Thank you!

## 2017-12-18 NOTE — TELEPHONE ENCOUNTER
----- Message from Chari Jones sent at 12/18/2017 12:27 PM CST -----  Contact: Rody with [facility]   Pharmacy is requesting prescription for [place] 3.  Call back [phone]

## 2017-12-18 NOTE — TELEPHONE ENCOUNTER
Spoke with patient. Informed patient that we have been receiving request for Omega 3 prescription. States he doesn't know anything about Omega 3. Did not request.

## 2017-12-20 ENCOUNTER — OFFICE VISIT (OUTPATIENT)
Dept: FAMILY MEDICINE | Facility: CLINIC | Age: 77
End: 2017-12-20
Payer: MEDICARE

## 2017-12-20 VITALS
RESPIRATION RATE: 16 BRPM | DIASTOLIC BLOOD PRESSURE: 62 MMHG | HEART RATE: 55 BPM | TEMPERATURE: 98 F | BODY MASS INDEX: 20.1 KG/M2 | SYSTOLIC BLOOD PRESSURE: 134 MMHG | HEIGHT: 67 IN | WEIGHT: 128.06 LBS | OXYGEN SATURATION: 98 %

## 2017-12-20 DIAGNOSIS — I10 ESSENTIAL HYPERTENSION: ICD-10-CM

## 2017-12-20 DIAGNOSIS — Z12.11 SCREENING FOR MALIGNANT NEOPLASM OF COLON: ICD-10-CM

## 2017-12-20 DIAGNOSIS — R41.3 SHORT-TERM MEMORY LOSS: ICD-10-CM

## 2017-12-20 DIAGNOSIS — I25.10 CORONARY ARTERY DISEASE INVOLVING NATIVE CORONARY ARTERY WITHOUT ANGINA PECTORIS, UNSPECIFIED WHETHER NATIVE OR TRANSPLANTED HEART: Primary | ICD-10-CM

## 2017-12-20 DIAGNOSIS — J30.9 ACUTE ALLERGIC RHINITIS, UNSPECIFIED SEASONALITY, UNSPECIFIED TRIGGER: ICD-10-CM

## 2017-12-20 PROCEDURE — 99499 UNLISTED E&M SERVICE: CPT | Mod: S$PBB,,, | Performed by: FAMILY MEDICINE

## 2017-12-20 PROCEDURE — 99999 PR PBB SHADOW E&M-EST. PATIENT-LVL III: CPT | Mod: PBBFAC,,, | Performed by: FAMILY MEDICINE

## 2017-12-20 PROCEDURE — 99214 OFFICE O/P EST MOD 30 MIN: CPT | Mod: S$GLB,,, | Performed by: FAMILY MEDICINE

## 2017-12-20 RX ORDER — ATORVASTATIN CALCIUM 40 MG/1
40 TABLET, FILM COATED ORAL NIGHTLY
Qty: 90 TABLET | Refills: 3 | Status: SHIPPED | OUTPATIENT
Start: 2017-12-20 | End: 2019-02-26

## 2017-12-20 RX ORDER — DONEPEZIL HYDROCHLORIDE 10 MG/1
10 TABLET, FILM COATED ORAL NIGHTLY
Qty: 90 TABLET | Refills: 3 | Status: SHIPPED | OUTPATIENT
Start: 2017-12-20 | End: 2018-11-26

## 2017-12-20 RX ORDER — LISINOPRIL 10 MG/1
10 TABLET ORAL DAILY
Qty: 90 TABLET | Refills: 3 | Status: SHIPPED | OUTPATIENT
Start: 2017-12-20 | End: 2019-02-26

## 2017-12-20 RX ORDER — LEVOCETIRIZINE DIHYDROCHLORIDE 5 MG/1
5 TABLET, FILM COATED ORAL NIGHTLY
Qty: 90 TABLET | Refills: 3 | Status: SHIPPED | OUTPATIENT
Start: 2017-12-20 | End: 2018-09-26 | Stop reason: SDUPTHER

## 2017-12-20 RX ORDER — ERYTHROMYCIN 5 MG/G
OINTMENT OPHTHALMIC 2 TIMES DAILY
COMMUNITY
End: 2018-11-26

## 2017-12-20 RX ORDER — HYDRALAZINE HYDROCHLORIDE 25 MG/1
25 TABLET, FILM COATED ORAL 3 TIMES DAILY
Qty: 90 TABLET | Refills: 8 | Status: SHIPPED | OUTPATIENT
Start: 2017-12-20 | End: 2018-11-26

## 2017-12-20 RX ORDER — AMLODIPINE BESYLATE 5 MG/1
5 TABLET ORAL DAILY
Qty: 90 TABLET | Refills: 1 | Status: SHIPPED | OUTPATIENT
Start: 2017-12-20 | End: 2018-06-24 | Stop reason: SDUPTHER

## 2017-12-20 NOTE — PROGRESS NOTES
Subjective:       Patient ID: Otis Rodriguez is a 77 y.o. male.    Chief Complaint: Shoulder Pain (having pain in his left over a week now) and Sinus Problem (runny eyes and nose. f/u bp)    HPI:  Here with multiple complaints. Hospitalized4 months ago for funny feeling in the head at [facility] and told he had a heart attack.  Reports 2 cardiac stents in 2010 and one in 2011.  Continues to complain of runny nose and watery eyes.  Review of Systems   Respiratory: Negative for shortness of breath.    Cardiovascular: Positive for chest pain.   Musculoskeletal: Positive for arthralgias.       Objective:      Physical Exam   Constitutional: He is oriented to person, place, and time. He appears well-developed and well-nourished.   Neck: Normal range of motion. Neck supple. No thyromegaly present.   Cardiovascular: Normal rate, regular rhythm and normal heart sounds.    No murmur heard.  Pulmonary/Chest: Effort normal and breath sounds normal. No respiratory distress. He has no wheezes.   Abdominal: Soft. Bowel sounds are normal. He exhibits no mass. There is no tenderness.   Musculoskeletal: He exhibits no edema.   Neurological: He is alert and oriented to person, place, and time.   Skin: Skin is warm and dry.   Psychiatric: He has a normal mood and affect.         Assessment:       1. Coronary artery disease involving native coronary artery without angina pectoris, unspecified whether native or transplanted heart    2. Essential hypertension    3. Acute allergic rhinitis, unspecified seasonality, unspecified trigger    4. Short-term memory loss    5. Screening for malignant neoplasm of colon        Plan:       Coronary artery disease involving native coronary artery without angina pectoris, unspecified whether native or transplanted heart  Patient followed by cardiology, overdue forhospital  follow up visit     Essential hypertension  -     hydrALAZINE (APRESOLINE) 25 MG tablet; Take 1 tablet (25 mg total) by mouth 3  (three) times daily.  Dispense: 90 tablet; Refill: 8  -     amLODIPine (NORVASC) 5 MG tablet; Take 1 tablet (5 mg total) by mouth once daily.  Dispense: 90 tablet; Refill: 1  -     lisinopril 10 MG tablet; Take 1 tablet (10 mg total) by mouth once daily.  Dispense: 90 tablet; Refill: 3  -     atorvastatin (LIPITOR) 40 MG tablet; Take 1 tablet (40 mg total) by mouth every evening.  Dispense: 90 tablet; Refill: 3    Acute allergic rhinitis, unspecified seasonality, unspecified trigger  -     levocetirizine (XYZAL) 5 MG tablet; Take 1 tablet (5 mg total) by mouth every evening.  Dispense: 90 tablet; Refill: 3    Short-term memory loss  -     donepezil (ARICEPT) 10 MG tablet; Take 1 tablet (10 mg total) by mouth every evening.  Dispense: 90 tablet; Refill: 3    Screening for malignant neoplasm of colon  -     Fecal Immunochemical Test (iFOBT); Future; Expected date: 12/20/2017        No Follow-up on file.

## 2018-01-08 ENCOUNTER — TELEPHONE (OUTPATIENT)
Dept: FAMILY MEDICINE | Facility: CLINIC | Age: 78
End: 2018-01-08

## 2018-01-08 NOTE — TELEPHONE ENCOUNTER
----- Message from Lalo Paul sent at 1/8/2018 10:45 AM CST -----  Contact: Isac [phone]  Pt says he has an appointment for Chuck Ramires tomorrow, but he needs blood work today. Please give him a call at your earliest convenience.

## 2018-01-08 NOTE — TELEPHONE ENCOUNTER
Called pt to find out what labs he is needing to get done; pt doesn't know; informed him he got labs done in November and we can fax those results if needed; pt verbalized understanding

## 2018-01-09 ENCOUNTER — LAB VISIT (OUTPATIENT)
Dept: LAB | Facility: HOSPITAL | Age: 78
End: 2018-01-09
Attending: FAMILY MEDICINE
Payer: MEDICARE

## 2018-01-09 DIAGNOSIS — I10 HYPERTENSION, ESSENTIAL: ICD-10-CM

## 2018-01-09 DIAGNOSIS — E78.5 HYPERLIPIDEMIA: Primary | ICD-10-CM

## 2018-01-09 LAB
ALBUMIN SERPL BCP-MCNC: 3.6 G/DL
ALP SERPL-CCNC: 99 U/L
ALT SERPL W/O P-5'-P-CCNC: 32 U/L
ANION GAP SERPL CALC-SCNC: 6 MMOL/L
AST SERPL-CCNC: 31 U/L
BILIRUB DIRECT SERPL-MCNC: 0.1 MG/DL
BILIRUB SERPL-MCNC: 0.2 MG/DL
BUN SERPL-MCNC: 19 MG/DL
CALCIUM SERPL-MCNC: 9.1 MG/DL
CHLORIDE SERPL-SCNC: 111 MMOL/L
CHOLEST SERPL-MCNC: 168 MG/DL
CHOLEST/HDLC SERPL: 2.6 {RATIO}
CO2 SERPL-SCNC: 27 MMOL/L
CREAT SERPL-MCNC: 1.4 MG/DL
EST. GFR  (AFRICAN AMERICAN): 56.4 ML/MIN/1.73 M^2
EST. GFR  (NON AFRICAN AMERICAN): 48.8 ML/MIN/1.73 M^2
GLUCOSE SERPL-MCNC: 91 MG/DL
HDLC SERPL-MCNC: 64 MG/DL
HDLC SERPL: 38.1 %
LDLC SERPL CALC-MCNC: 92.4 MG/DL
NONHDLC SERPL-MCNC: 104 MG/DL
POTASSIUM SERPL-SCNC: 4.2 MMOL/L
PROT SERPL-MCNC: 7.4 G/DL
SODIUM SERPL-SCNC: 144 MMOL/L
TRIGL SERPL-MCNC: 58 MG/DL

## 2018-01-09 PROCEDURE — 80048 BASIC METABOLIC PNL TOTAL CA: CPT

## 2018-01-09 PROCEDURE — 80061 LIPID PANEL: CPT

## 2018-01-09 PROCEDURE — 80076 HEPATIC FUNCTION PANEL: CPT

## 2018-01-09 PROCEDURE — 36415 COLL VENOUS BLD VENIPUNCTURE: CPT | Mod: PO

## 2018-01-13 ENCOUNTER — LAB VISIT (OUTPATIENT)
Dept: LAB | Facility: HOSPITAL | Age: 78
End: 2018-01-13
Attending: FAMILY MEDICINE
Payer: MEDICARE

## 2018-01-13 DIAGNOSIS — Z12.11 SCREENING FOR MALIGNANT NEOPLASM OF COLON: ICD-10-CM

## 2018-01-13 PROCEDURE — 82274 ASSAY TEST FOR BLOOD FECAL: CPT

## 2018-01-15 LAB — HEMOCCULT STL QL IA: NEGATIVE

## 2018-01-23 ENCOUNTER — TELEPHONE (OUTPATIENT)
Dept: SMOKING CESSATION | Facility: CLINIC | Age: 78
End: 2018-01-23

## 2018-01-23 ENCOUNTER — TELEPHONE (OUTPATIENT)
Dept: FAMILY MEDICINE | Facility: CLINIC | Age: 78
End: 2018-01-23

## 2018-01-23 NOTE — TELEPHONE ENCOUNTER
----- Message from Chari Jones sent at 1/22/2018  4:47 PM CST -----  Contact: self  Pt states he just left to hospital and needs to know when he should come to in to see Dr. Miles. He is asking for a call back from the nurse. [phone].

## 2018-01-23 NOTE — TELEPHONE ENCOUNTER
Spoke with patient. States that he seen the cardiologist at [facility] yesterday and they want to see him back in 3 months.At last visit with  he was told to come back in about 6 months wants to know if he should come in sooner.Please advise.

## 2018-01-24 ENCOUNTER — CLINICAL SUPPORT (OUTPATIENT)
Dept: SMOKING CESSATION | Facility: CLINIC | Age: 78
End: 2018-01-24
Payer: COMMERCIAL

## 2018-01-24 DIAGNOSIS — F17.200 NICOTINE DEPENDENCE: Primary | ICD-10-CM

## 2018-01-24 PROCEDURE — 99407 BEHAV CHNG SMOKING > 10 MIN: CPT | Mod: S$GLB,,,

## 2018-02-14 DIAGNOSIS — J30.9 ACUTE ALLERGIC RHINITIS, UNSPECIFIED SEASONALITY, UNSPECIFIED TRIGGER: ICD-10-CM

## 2018-02-14 DIAGNOSIS — F32.A DEPRESSION, UNSPECIFIED DEPRESSION TYPE: ICD-10-CM

## 2018-02-14 DIAGNOSIS — F17.210 CIGARETTE NICOTINE DEPENDENCE WITHOUT COMPLICATION: ICD-10-CM

## 2018-02-14 RX ORDER — BUPROPION HYDROCHLORIDE 200 MG/1
TABLET, EXTENDED RELEASE ORAL
Qty: 180 TABLET | Refills: 3 | Status: SHIPPED | OUTPATIENT
Start: 2018-02-14 | End: 2018-11-26

## 2018-02-14 RX ORDER — FLUTICASONE PROPIONATE 50 MCG
SPRAY, SUSPENSION (ML) NASAL
Qty: 16 G | Refills: 1 | Status: SHIPPED | OUTPATIENT
Start: 2018-02-14 | End: 2018-09-26 | Stop reason: SDUPTHER

## 2018-02-28 ENCOUNTER — HOSPITAL ENCOUNTER (OUTPATIENT)
Dept: RADIOLOGY | Facility: HOSPITAL | Age: 78
Discharge: HOME | End: 2018-02-28
Attending: PHYSICIAN ASSISTANT
Payer: MEDICARE

## 2018-02-28 ENCOUNTER — OFFICE VISIT (OUTPATIENT)
Dept: FAMILY MEDICINE | Facility: CLINIC | Age: 78
End: 2018-02-28
Payer: MEDICARE

## 2018-02-28 VITALS
OXYGEN SATURATION: 96 % | RESPIRATION RATE: 18 BRPM | SYSTOLIC BLOOD PRESSURE: 108 MMHG | BODY MASS INDEX: 19.51 KG/M2 | TEMPERATURE: 98 F | DIASTOLIC BLOOD PRESSURE: 54 MMHG | HEART RATE: 64 BPM | WEIGHT: 124.31 LBS | HEIGHT: 67 IN

## 2018-02-28 DIAGNOSIS — I70.0 AORTIC ATHEROSCLEROSIS: ICD-10-CM

## 2018-02-28 DIAGNOSIS — R06.02 SOB (SHORTNESS OF BREATH): ICD-10-CM

## 2018-02-28 DIAGNOSIS — J44.1 COPD WITH ACUTE EXACERBATION: Primary | ICD-10-CM

## 2018-02-28 DIAGNOSIS — R06.2 WHEEZING: ICD-10-CM

## 2018-02-28 PROCEDURE — 94640 AIRWAY INHALATION TREATMENT: CPT | Mod: S$GLB,,, | Performed by: FAMILY MEDICINE

## 2018-02-28 PROCEDURE — 71046 X-RAY EXAM CHEST 2 VIEWS: CPT | Mod: TC,FY,PO

## 2018-02-28 PROCEDURE — 99999 PR PBB SHADOW E&M-EST. PATIENT-LVL V: CPT | Mod: PBBFAC,,, | Performed by: PHYSICIAN ASSISTANT

## 2018-02-28 PROCEDURE — 99499 UNLISTED E&M SERVICE: CPT | Mod: S$GLB,,, | Performed by: PHYSICIAN ASSISTANT

## 2018-02-28 PROCEDURE — 3078F DIAST BP <80 MM HG: CPT | Mod: S$GLB,,, | Performed by: PHYSICIAN ASSISTANT

## 2018-02-28 PROCEDURE — 3074F SYST BP LT 130 MM HG: CPT | Mod: S$GLB,,, | Performed by: PHYSICIAN ASSISTANT

## 2018-02-28 PROCEDURE — 71046 X-RAY EXAM CHEST 2 VIEWS: CPT | Mod: 26,,, | Performed by: RADIOLOGY

## 2018-02-28 PROCEDURE — 99214 OFFICE O/P EST MOD 30 MIN: CPT | Mod: S$GLB,,, | Performed by: PHYSICIAN ASSISTANT

## 2018-02-28 RX ORDER — FUROSEMIDE 20 MG/1
TABLET ORAL
COMMUNITY
Start: 2018-02-27 | End: 2018-11-26

## 2018-02-28 RX ORDER — LEVALBUTEROL INHALATION SOLUTION 1.25 MG/3ML
1.25 SOLUTION RESPIRATORY (INHALATION)
Status: COMPLETED | OUTPATIENT
Start: 2018-02-28 | End: 2018-02-28

## 2018-02-28 RX ORDER — PROMETHAZINE HYDROCHLORIDE AND DEXTROMETHORPHAN HYDROBROMIDE 6.25; 15 MG/5ML; MG/5ML
5 SYRUP ORAL 3 TIMES DAILY PRN
Qty: 180 ML | Refills: 0 | Status: SHIPPED | OUTPATIENT
Start: 2018-02-28 | End: 2018-03-10

## 2018-02-28 RX ORDER — DOXYCYCLINE 100 MG/1
100 CAPSULE ORAL EVERY 12 HOURS
Qty: 14 CAPSULE | Refills: 0 | Status: SHIPPED | OUTPATIENT
Start: 2018-02-28 | End: 2018-03-10

## 2018-02-28 RX ORDER — FUROSEMIDE 20 MG/1
TABLET ORAL
Status: CANCELLED | OUTPATIENT
Start: 2018-02-28

## 2018-02-28 RX ORDER — PREDNISONE 20 MG/1
TABLET ORAL
Qty: 20 TABLET | Refills: 0 | Status: SHIPPED | OUTPATIENT
Start: 2018-02-28 | End: 2018-11-26

## 2018-02-28 RX ORDER — ATORVASTATIN CALCIUM 80 MG/1
TABLET, FILM COATED ORAL
COMMUNITY
Start: 2018-02-21 | End: 2018-09-26

## 2018-02-28 RX ADMIN — LEVALBUTEROL INHALATION SOLUTION 1.25 MG: 1.25 SOLUTION RESPIRATORY (INHALATION) at 04:02

## 2018-02-28 NOTE — PROGRESS NOTES
"Subjective:       Patient ID: Otis Rodriguez is a 77 y.o. male.    Chief Complaint: Sinus Problem (cough, headache, runny nose,chest congestion for 1 day)    Cough   This is a new problem. The current episode started yesterday. The problem has been unchanged. The problem occurs constantly. The cough is productive of sputum. Associated symptoms include headaches, rhinorrhea, shortness of breath and wheezing. Pertinent negatives include no fever, hemoptysis or nasal congestion. He has tried nothing for the symptoms. There is no history of asthma, bronchitis, COPD or pneumonia.     Social History     Social History    Marital status:      Spouse name: N/A    Number of children: N/A    Years of education: N/A     Occupational History    Not on file.     Social History Main Topics    Smoking status: Former Smoker     Packs/day: 0.50     Types: Cigarettes     Quit date: 1/31/2017    Smokeless tobacco: Never Used      Comment: daily marijuana, history of crack cocaine abuse 2010    Alcohol use No      Comment: History of heavy ETOH in past    Drug use: Yes     Types: Marijuana, "Crack" cocaine    Sexual activity: Not Currently     Partners: Female      Comment: divorced9/12/17     Other Topics Concern    Not on file     Social History Narrative    No narrative on file       Review of Systems   Constitutional: Negative for fever.   HENT: Positive for rhinorrhea.    Respiratory: Positive for cough, shortness of breath and wheezing. Negative for hemoptysis.    Neurological: Positive for headaches.       Objective:      Physical Exam   Constitutional: No distress.   thin   Cardiovascular: Normal rate and regular rhythm.    Pulmonary/Chest: No respiratory distress. He has wheezes in the right upper field, the right middle field, the right lower field, the left upper field, the left middle field and the left lower field. He has rhonchi in the right middle field, the right lower field, the left middle field and " the left lower field.   Skin: Skin is warm and dry. He is not diaphoretic.   Psychiatric: He has a normal mood and affect. His behavior is normal.   Vitals reviewed.      Assessment:       1. COPD with acute exacerbation    2. Aortic atherosclerosis        Plan:         Otis was seen today for sinus problem.    Diagnoses and all orders for this visit:    COPD with acute exacerbation  -     X-Ray Chest PA And Lateral; reviewed by me consistent with COPD  -     levalbuterol nebulizer solution 1.25 mg; Take 3 mLs (1.25 mg total) by nebulization one time.  -     predniSONE (DELTASONE) 20 MG tablet; Take 3 pills for 3 days then 2 pills for 3 days then 1 pill for 3 days then 1/2 pill for 4 days  -     doxycycline (VIBRAMYCIN) 100 MG Cap; Take 1 capsule (100 mg total) by mouth every 12 (twelve) hours.  -     promethazine-dextromethorphan (PROMETHAZINE-DM) 6.25-15 mg/5 mL Syrp; Take 5 mLs by mouth 3 (three) times daily as needed.  -     Pt was advised to increase fluids and if breathing worsens to go to ED    Aortic atherosclerosis  -   Continue current meds

## 2018-05-14 ENCOUNTER — LAB VISIT (OUTPATIENT)
Dept: LAB | Facility: HOSPITAL | Age: 78
End: 2018-05-14
Attending: FAMILY MEDICINE
Payer: MEDICARE

## 2018-05-14 DIAGNOSIS — E78.5 HYPERLIPIDEMIA LDL GOAL <100: ICD-10-CM

## 2018-05-14 DIAGNOSIS — E78.5 HYPERLIPIDEMIA LDL GOAL <100: Primary | ICD-10-CM

## 2018-05-14 LAB
ALBUMIN SERPL BCP-MCNC: 3.9 G/DL
ALBUMIN SERPL BCP-MCNC: 3.9 G/DL
ALP SERPL-CCNC: 66 U/L
ALP SERPL-CCNC: 66 U/L
ALT SERPL W/O P-5'-P-CCNC: 22 U/L
ALT SERPL W/O P-5'-P-CCNC: 22 U/L
ANION GAP SERPL CALC-SCNC: 10 MMOL/L
AST SERPL-CCNC: 28 U/L
AST SERPL-CCNC: 28 U/L
BILIRUB DIRECT SERPL-MCNC: 0.2 MG/DL
BILIRUB SERPL-MCNC: 0.5 MG/DL
BILIRUB SERPL-MCNC: 0.5 MG/DL
BUN SERPL-MCNC: 19 MG/DL
CALCIUM SERPL-MCNC: 9.8 MG/DL
CHLORIDE SERPL-SCNC: 110 MMOL/L
CHOLEST SERPL-MCNC: 157 MG/DL
CHOLEST/HDLC SERPL: 2.8 {RATIO}
CO2 SERPL-SCNC: 23 MMOL/L
CREAT SERPL-MCNC: 1.4 MG/DL
EST. GFR  (AFRICAN AMERICAN): 56.4 ML/MIN/1.73 M^2
EST. GFR  (NON AFRICAN AMERICAN): 48.8 ML/MIN/1.73 M^2
GLUCOSE SERPL-MCNC: 94 MG/DL
HDLC SERPL-MCNC: 56 MG/DL
HDLC SERPL: 35.7 %
LDLC SERPL CALC-MCNC: 86.6 MG/DL
NONHDLC SERPL-MCNC: 101 MG/DL
POTASSIUM SERPL-SCNC: 4.6 MMOL/L
PROT SERPL-MCNC: 7.1 G/DL
PROT SERPL-MCNC: 7.1 G/DL
SODIUM SERPL-SCNC: 143 MMOL/L
TRIGL SERPL-MCNC: 72 MG/DL

## 2018-05-14 PROCEDURE — 80061 LIPID PANEL: CPT

## 2018-05-14 PROCEDURE — 80053 COMPREHEN METABOLIC PANEL: CPT

## 2018-05-14 PROCEDURE — 36415 COLL VENOUS BLD VENIPUNCTURE: CPT | Mod: PO

## 2018-05-23 ENCOUNTER — TELEPHONE (OUTPATIENT)
Dept: FAMILY MEDICINE | Facility: CLINIC | Age: 78
End: 2018-05-23

## 2018-05-23 NOTE — TELEPHONE ENCOUNTER
----- Message from Chari Jones sent at 5/23/2018  3:39 PM CDT -----  Contact: Marquez with Dr[facility] Reynolds [facility]   [facility] states pt has an appt tomorrow. They are asking for labs completed on 05/14 to be faxed to them.   Fax # is [phone].   Call back #  [phone]

## 2018-05-25 DIAGNOSIS — I25.10 CORONARY ARTERY DISEASE INVOLVING NATIVE CORONARY ARTERY WITHOUT ANGINA PECTORIS, UNSPECIFIED WHETHER NATIVE OR TRANSPLANTED HEART: ICD-10-CM

## 2018-05-25 RX ORDER — METOPROLOL SUCCINATE 50 MG/1
50 TABLET, EXTENDED RELEASE ORAL 2 TIMES DAILY
Qty: 60 TABLET | Refills: 6 | Status: SHIPPED | OUTPATIENT
Start: 2018-05-25 | End: 2018-10-24 | Stop reason: SDUPTHER

## 2018-05-25 NOTE — TELEPHONE ENCOUNTER
----- Message from Teja Díaz sent at 5/25/2018 11:00 AM CDT -----  Contact: Self  REFILL: metoprolol succinate (TOPROL-XL) 50 MG 24 hr tablet    PHARMACY: [facility]

## 2018-06-22 DIAGNOSIS — G47.00 INSOMNIA, UNSPECIFIED TYPE: ICD-10-CM

## 2018-06-24 DIAGNOSIS — I10 ESSENTIAL HYPERTENSION: ICD-10-CM

## 2018-06-25 RX ORDER — QUETIAPINE FUMARATE 50 MG/1
50 TABLET, FILM COATED ORAL NIGHTLY
Qty: 90 TABLET | Refills: 2 | Status: SHIPPED | OUTPATIENT
Start: 2018-06-25 | End: 2018-11-26

## 2018-06-25 RX ORDER — AMLODIPINE BESYLATE 5 MG/1
5 TABLET ORAL DAILY
Qty: 90 TABLET | Refills: 1 | Status: SHIPPED | OUTPATIENT
Start: 2018-06-25 | End: 2018-12-17 | Stop reason: DRUGHIGH

## 2018-07-26 ENCOUNTER — TELEPHONE (OUTPATIENT)
Dept: FAMILY MEDICINE | Facility: CLINIC | Age: 78
End: 2018-07-26

## 2018-07-26 DIAGNOSIS — L84 CALLUS OF FOOT: Primary | ICD-10-CM

## 2018-07-26 NOTE — TELEPHONE ENCOUNTER
----- Message from Blanche Lopez sent at 7/26/2018  3:52 PM CDT -----  Contact: self  [phone]  Pt is requesting to speak to you, pt hung on the phone before I could ask what this is about./ Pls call pt [phone]. Thanks................Tabby

## 2018-07-26 NOTE — TELEPHONE ENCOUNTER
Patient stated that he has callus on his feet that are bothering him very badly requesting a referral to podiatry to address. Please advise, thank you

## 2018-07-29 DIAGNOSIS — I25.10 CORONARY ARTERY DISEASE INVOLVING NATIVE CORONARY ARTERY WITHOUT ANGINA PECTORIS, UNSPECIFIED WHETHER NATIVE OR TRANSPLANTED HEART: ICD-10-CM

## 2018-07-30 RX ORDER — NITROGLYCERIN 400 UG/1
SPRAY ORAL
Qty: 12 G | Refills: 2 | Status: SHIPPED | OUTPATIENT
Start: 2018-07-30 | End: 2018-11-26

## 2018-08-06 ENCOUNTER — TELEPHONE (OUTPATIENT)
Dept: FAMILY MEDICINE | Facility: CLINIC | Age: 78
End: 2018-08-06

## 2018-08-06 NOTE — LETTER
August 7, 2018    Otis Rodriguez  [address] [place]  [place] [address]             [facility]  [address] [facility] [address] [facility] [place] [address]  Phone: [phone]  Fax: [phone] Dear  Michael:    Sorry we were unable to contact you to schedule your Podiatry appointment. Please give the referral department a call at [phone].        If you have any questions or concerns, please don't hesitate to call.    Sincerely,        Javi Ching MA

## 2018-09-06 ENCOUNTER — TELEPHONE (OUTPATIENT)
Dept: FAMILY MEDICINE | Facility: CLINIC | Age: 78
End: 2018-09-06

## 2018-09-06 NOTE — TELEPHONE ENCOUNTER
----- Message from Nai Kwon sent at 9/6/2018 10:27 AM CDT -----  Contact: Self   Patient is requesting to speak with the office about some paperwork from [facility]. Please call at [phone].

## 2018-09-07 NOTE — TELEPHONE ENCOUNTER
Pt states he has some paperwork he received from maxim [facility] that he was told to give to his PCP; informed pt he can drop off at office

## 2018-09-13 ENCOUNTER — TELEPHONE (OUTPATIENT)
Dept: FAMILY MEDICINE | Facility: CLINIC | Age: 78
End: 2018-09-13

## 2018-09-13 NOTE — TELEPHONE ENCOUNTER
----- Message from Cherie Doss sent at 9/13/2018 11:05 AM CDT -----  Contact: self  Please call pt to discuss medication to [phone]

## 2018-09-26 ENCOUNTER — OFFICE VISIT (OUTPATIENT)
Dept: FAMILY MEDICINE | Facility: CLINIC | Age: 78
End: 2018-09-26
Payer: MEDICARE

## 2018-09-26 VITALS
DIASTOLIC BLOOD PRESSURE: 64 MMHG | HEART RATE: 49 BPM | BODY MASS INDEX: 19.82 KG/M2 | RESPIRATION RATE: 16 BRPM | HEIGHT: 67 IN | OXYGEN SATURATION: 99 % | TEMPERATURE: 98 F | WEIGHT: 126.31 LBS | SYSTOLIC BLOOD PRESSURE: 120 MMHG

## 2018-09-26 DIAGNOSIS — J30.9 ALLERGIC RHINITIS, UNSPECIFIED SEASONALITY, UNSPECIFIED TRIGGER: Primary | ICD-10-CM

## 2018-09-26 DIAGNOSIS — F32.A MODERATELY SEVERE DEPRESSION: ICD-10-CM

## 2018-09-26 DIAGNOSIS — I25.10 CORONARY ARTERY DISEASE INVOLVING NATIVE CORONARY ARTERY WITHOUT ANGINA PECTORIS, UNSPECIFIED WHETHER NATIVE OR TRANSPLANTED HEART: ICD-10-CM

## 2018-09-26 PROCEDURE — 99499 UNLISTED E&M SERVICE: CPT | Mod: S$GLB,,, | Performed by: FAMILY MEDICINE

## 2018-09-26 PROCEDURE — 99214 OFFICE O/P EST MOD 30 MIN: CPT | Mod: S$PBB,,, | Performed by: FAMILY MEDICINE

## 2018-09-26 PROCEDURE — 3074F SYST BP LT 130 MM HG: CPT | Mod: CPTII,,, | Performed by: FAMILY MEDICINE

## 2018-09-26 PROCEDURE — 99213 OFFICE O/P EST LOW 20 MIN: CPT | Mod: PBBFAC,PO | Performed by: FAMILY MEDICINE

## 2018-09-26 PROCEDURE — 3078F DIAST BP <80 MM HG: CPT | Mod: CPTII,,, | Performed by: FAMILY MEDICINE

## 2018-09-26 PROCEDURE — 99999 PR PBB SHADOW E&M-EST. PATIENT-LVL III: CPT | Mod: PBBFAC,,, | Performed by: FAMILY MEDICINE

## 2018-09-26 PROCEDURE — 1101F PT FALLS ASSESS-DOCD LE1/YR: CPT | Mod: CPTII,,, | Performed by: FAMILY MEDICINE

## 2018-09-26 RX ORDER — LEVOCETIRIZINE DIHYDROCHLORIDE 5 MG/1
5 TABLET, FILM COATED ORAL NIGHTLY
Qty: 90 TABLET | Refills: 3 | Status: SHIPPED | OUTPATIENT
Start: 2018-09-26 | End: 2020-01-23

## 2018-09-26 RX ORDER — METOPROLOL TARTRATE 50 MG/1
50 TABLET ORAL 2 TIMES DAILY
COMMUNITY
Start: 2013-06-03 | End: 2018-10-25 | Stop reason: ALTCHOICE

## 2018-09-26 RX ORDER — AMOXICILLIN 875 MG/1
875 TABLET, FILM COATED ORAL EVERY 12 HOURS
COMMUNITY
End: 2018-11-26 | Stop reason: ALTCHOICE

## 2018-09-26 RX ORDER — FLUTICASONE PROPIONATE 50 MCG
2 SPRAY, SUSPENSION (ML) NASAL
Qty: 1 BOTTLE | Refills: 11 | Status: SHIPPED | OUTPATIENT
Start: 2018-09-26 | End: 2019-04-04 | Stop reason: SDUPTHER

## 2018-09-26 NOTE — PROGRESS NOTES
Subjective:       Patient ID: Otis Rodriguez     Chief Complaint: Hospital Follow Up      Bob Rodriguez is a 78 y.o. male. Here for follow up ER visit at [facility].  x 2 occasions on yesterday.  Reports chronic HA, runny nose and watery eyes. Patient Rx amoxil for 7 days.  BP .200 systolic on yesterday.  Reports feeling funny around head and had sharp pains in the chest.  Reports usining NTG spray.  Patient told he needs open heart surgery for CAD by cardiologist.  S/p stents 2011.  Scheduled for follow up appointment next month.  Reports insomnia.  Drinks 1L coke per day.    Review of patient's allergies indicates:  No Known Allergies    Current Outpatient Medications:     amLODIPine (NORVASC) 5 MG tablet, TAKE 1 TABLET (5 MG TOTAL) BY MOUTH ONCE DAILY., Disp: 90 tablet, Rfl: 1    amoxicillin (AMOXIL) 875 MG tablet, Take 875 mg by mouth every 12 (twelve) hours., Disp: , Rfl:     aspirin (ECOTRIN) 325 MG EC tablet, TAKE 1 TABLET (325 MG TOTAL) BY MOUTH ONCE DAILY., Disp: 90 tablet, Rfl: 3    atorvastatin (LIPITOR) 40 MG tablet, Take 1 tablet (40 mg total) by mouth every evening., Disp: 90 tablet, Rfl: 3    buPROPion (WELLBUTRIN SR) 200 MG Tb12, TAKE 1 TABLET BY MOUTH 2 TIMES A DAY, Disp: 180 tablet, Rfl: 3    fluticasone (FLONASE) 50 mcg/actuation nasal spray, 2 sprays (100 mcg total) by Each Nare route as needed for Rhinitis., Disp: 1 Bottle, Rfl: 11    hydrALAZINE (APRESOLINE) 25 MG tablet, Take 1 tablet (25 mg total) by mouth 3 (three) times daily., Disp: 90 tablet, Rfl: 8    azelastine (OPTIVAR) 0.05 % ophthalmic solution, Place 1 drop into both eyes 2 (two) times daily., Disp: 6 mL, Rfl: 11    donepezil (ARICEPT) 10 MG tablet, Take 1 tablet (10 mg total) by mouth every evening., Disp: 90 tablet, Rfl: 3    erythromycin (ROMYCIN) ophthalmic ointment, Place into both eyes 2 (two) times daily., Disp: , Rfl:     furosemide (LASIX) 20 MG tablet, , Disp: , Rfl:     ketotifen (ALLERGY EYE, KETOTIFEN,)  0.025 % (0.035 %) ophthalmic solution, Place 1 drop into both eyes 2 (two) times daily., Disp: 10 mL, Rfl: 0    levocetirizine (XYZAL) 5 MG tablet, Take 1 tablet (5 mg total) by mouth every evening., Disp: 90 tablet, Rfl: 3    lisinopril 10 MG tablet, Take 1 tablet (10 mg total) by mouth once daily., Disp: 90 tablet, Rfl: 3    metoprolol succinate (TOPROL-XL) 50 MG 24 hr tablet, Take 1 tablet (50 mg total) by mouth 2 (two) times daily., Disp: 60 tablet, Rfl: 6    metoprolol tartrate (LOPRESSOR) 50 MG tablet, Take 50 mg by mouth 2 (two) times daily., Disp: , Rfl:     mirtazapine (REMERON) 45 MG tablet, Take 1 tablet (45 mg total) by mouth every evening., Disp: 90 tablet, Rfl: 1    nicotine polacrilex 2 MG Lozg, Take 1 lozenge (2 mg total) by mouth as needed., Disp: 108 lozenge, Rfl: 0    nitroGLYCERIN 0.4 MG/DOSE TL SPRY (NITROLINGUAL) 400 mcg/spray spray, 1-2 sprays SL q5mins prn; Max is 3 in 15 mins, Disp: 12 g, Rfl: 2    predniSONE (DELTASONE) 20 MG tablet, Take 3 pills for 3 days then 2 pills for 3 days then 1 pill for 3 days then 1/2 pill for 4 days, Disp: 20 tablet, Rfl: 0    QUEtiapine (SEROQUEL) 50 MG tablet, TAKE 1 TABLET (50 MG TOTAL) BY MOUTH EVERY EVENING., Disp: 90 tablet, Rfl: 2    Past Medical History:   Diagnosis Date    CAD (coronary artery disease) 1/12/2015    Cerebrovascular disease 1/12/2015    Depression     Essential hypertension, benign 1/12/2015    Other and unspecified hyperlipidemia 1/12/2015     Review of Systems   Constitutional: Negative for fever.   Respiratory: Negative for shortness of breath.    Cardiovascular: Negative for chest pain.   Psychiatric/Behavioral: Positive for sleep disturbance.       Objective:      Physical Exam   Constitutional: He is oriented to person, place, and time. He appears well-developed and well-nourished.   HENT:   Head: Normocephalic and atraumatic.   Nose: Nose normal.   Mouth/Throat: Oropharynx is clear and moist. No oropharyngeal exudate.    Pale nasal turbinates   Eyes: Left eye exhibits no discharge.   Neck: Normal range of motion. Neck supple.   Cardiovascular: Normal rate and regular rhythm.   No murmur heard.  Pulmonary/Chest: Effort normal and breath sounds normal. He has no wheezes. He has no rales.   Musculoskeletal: He exhibits no edema.   Lymphadenopathy:     He has no cervical adenopathy.   Neurological: He is alert and oriented to person, place, and time.   Skin: Skin is warm and dry. No rash noted.   Psychiatric: He has a normal mood and affect.       Assessment:       1. Allergic rhinitis, unspecified seasonality, unspecified trigger    2. Moderately severe depression    3. Coronary artery disease involving native coronary artery without angina pectoris, unspecified whether native or transplanted heart        Plan:       Otis was seen today for hospital follow up.    Diagnoses and all orders for this visit:    Allergic rhinitis, unspecified seasonality, unspecified trigger  -     levocetirizine (XYZAL) 5 MG tablet; Take 1 tablet (5 mg total) by mouth every evening.  -     fluticasone (FLONASE) 50 mcg/actuation nasal spray; 2 sprays (100 mcg total) by Each Nare route as needed for Rhinitis.    CAD  Clinically stable.  Continue NTG as needed.  Follow up with cardiology next month or sooner if needed.    Moderately severe depression   continue remeron and seroquel

## 2018-09-27 ENCOUNTER — TELEPHONE (OUTPATIENT)
Dept: FAMILY MEDICINE | Facility: CLINIC | Age: 78
End: 2018-09-27

## 2018-09-27 NOTE — TELEPHONE ENCOUNTER
----- Message from Karly Mcdermott sent at 9/27/2018  8:56 AM CDT -----  Contact: Self  Pt is calling to speak with staff regarding medication. Please call pt at [phone].

## 2018-10-24 DIAGNOSIS — I25.10 CORONARY ARTERY DISEASE INVOLVING NATIVE CORONARY ARTERY WITHOUT ANGINA PECTORIS, UNSPECIFIED WHETHER NATIVE OR TRANSPLANTED HEART: ICD-10-CM

## 2018-10-24 NOTE — TELEPHONE ENCOUNTER
----- Message from Chari Karen sent at 10/24/2018 10:26 AM CDT -----  Contact: self - [phone]  Refill request for--metoprolol succinate (TOPROL-XL) 50 MG 24 hr tablet      ..  [facility]/pharmacy #[id number] - [facility] [address]  [address] [facility] [address] [facility] [address]  Phone: [phone] Fax: [phone]

## 2018-10-25 RX ORDER — METOPROLOL SUCCINATE 50 MG/1
50 TABLET, EXTENDED RELEASE ORAL 2 TIMES DAILY
Qty: 60 TABLET | Refills: 4 | Status: SHIPPED | OUTPATIENT
Start: 2018-10-25 | End: 2019-02-26

## 2018-10-26 ENCOUNTER — TELEPHONE (OUTPATIENT)
Dept: FAMILY MEDICINE | Facility: CLINIC | Age: 78
End: 2018-10-26

## 2018-10-26 NOTE — TELEPHONE ENCOUNTER
Returned call to patient, lm noting no appt scheduled with  or another provider and to call if any questions

## 2018-10-26 NOTE — TELEPHONE ENCOUNTER
----- Message from Charlene Rivera sent at 10/26/2018  7:17 AM CDT -----  Contact: Self/[phone]  Patient called stating her had an appointment today, but none was scheduled.  He would like someone from the staff to give him a call to let him know if he has to come in or not.  Thank you.

## 2018-11-14 ENCOUNTER — CLINICAL SUPPORT (OUTPATIENT)
Dept: SMOKING CESSATION | Facility: CLINIC | Age: 78
End: 2018-11-14
Payer: COMMERCIAL

## 2018-11-14 DIAGNOSIS — F17.200 NICOTINE DEPENDENCE: Primary | ICD-10-CM

## 2018-11-14 PROCEDURE — 99406 BEHAV CHNG SMOKING 3-10 MIN: CPT | Mod: S$GLB,,,

## 2018-11-14 NOTE — PROGRESS NOTES
Called pt to f/u on his 6 and 12 month smoking cessation quit status. Pt's relative stated he remains tobacco free. She stated he is currently in the hospital for open heart surgery. Informed her of benefit period, phone follow ups, and contact information. Will complete and resolve quit #2 episode.

## 2018-11-21 ENCOUNTER — TELEPHONE (OUTPATIENT)
Dept: FAMILY MEDICINE | Facility: CLINIC | Age: 78
End: 2018-11-21

## 2018-11-21 NOTE — TELEPHONE ENCOUNTER
----- Message from Cherie Doss sent at 11/21/2018  8:38 AM CST -----  Contact: self  Pt calling to state that he did not receive all his medications from pharmacy. Pt was asked what he was missing and what he received he said he doesn't know. Pt states just send the rest of his medications and hung up.

## 2018-11-21 NOTE — TELEPHONE ENCOUNTER
Patient, stated the pharmacy would not give him 3 of the 5 medications that he requested. I asked patient which medications he is requesting patient stated he did not know. I called to pharmacy to inquire, spoke with Danielle, pharmacist at [facility], stated that there are some medications On hold due to be filled in a few days, no insurance issues. Called patient to notify of the same, patient verbalized understanding

## 2018-11-26 ENCOUNTER — OFFICE VISIT (OUTPATIENT)
Dept: FAMILY MEDICINE | Facility: CLINIC | Age: 78
End: 2018-11-26
Payer: MEDICARE

## 2018-11-26 VITALS
HEIGHT: 67 IN | TEMPERATURE: 97 F | WEIGHT: 119.06 LBS | OXYGEN SATURATION: 98 % | BODY MASS INDEX: 18.69 KG/M2 | SYSTOLIC BLOOD PRESSURE: 114 MMHG | RESPIRATION RATE: 20 BRPM | HEART RATE: 71 BPM | DIASTOLIC BLOOD PRESSURE: 52 MMHG

## 2018-11-26 DIAGNOSIS — G47.00 INSOMNIA, UNSPECIFIED TYPE: ICD-10-CM

## 2018-11-26 DIAGNOSIS — R35.0 URINARY FREQUENCY: ICD-10-CM

## 2018-11-26 DIAGNOSIS — Z95.1 HX OF CABG: Primary | ICD-10-CM

## 2018-11-26 DIAGNOSIS — Z23 NEED FOR DIPHTHERIA, TETANUS, PERTUSSIS, AND HIB VACCINATION: ICD-10-CM

## 2018-11-26 LAB
BILIRUB SERPL-MCNC: ABNORMAL MG/DL
BLOOD URINE, POC: ABNORMAL
COLOR, POC UA: YELLOW
GLUCOSE UR QL STRIP: NORMAL
KETONES UR QL STRIP: ABNORMAL
LEUKOCYTE ESTERASE URINE, POC: ABNORMAL
NITRITE, POC UA: ABNORMAL
PH, POC UA: 5
PROTEIN, POC: ABNORMAL
SPECIFIC GRAVITY, POC UA: 1.02
UROBILINOGEN, POC UA: NORMAL

## 2018-11-26 PROCEDURE — 81001 URINALYSIS AUTO W/SCOPE: CPT | Mod: HCNC,S$GLB,, | Performed by: FAMILY MEDICINE

## 2018-11-26 PROCEDURE — 99214 OFFICE O/P EST MOD 30 MIN: CPT | Mod: HCNC,25,S$GLB, | Performed by: FAMILY MEDICINE

## 2018-11-26 PROCEDURE — 90471 IMMUNIZATION ADMIN: CPT | Mod: HCNC,S$GLB,, | Performed by: FAMILY MEDICINE

## 2018-11-26 PROCEDURE — 99999 PR PBB SHADOW E&M-EST. PATIENT-LVL IV: CPT | Mod: PBBFAC,HCNC,, | Performed by: FAMILY MEDICINE

## 2018-11-26 PROCEDURE — 90714 TD VACC NO PRESV 7 YRS+ IM: CPT | Mod: HCNC,S$GLB,, | Performed by: FAMILY MEDICINE

## 2018-11-26 PROCEDURE — 87086 URINE CULTURE/COLONY COUNT: CPT | Mod: HCNC

## 2018-11-26 RX ORDER — TAMSULOSIN HYDROCHLORIDE 0.4 MG/1
0.4 CAPSULE ORAL DAILY
Qty: 90 CAPSULE | Refills: 3 | Status: SHIPPED | OUTPATIENT
Start: 2018-11-26 | End: 2019-04-04 | Stop reason: SDUPTHER

## 2018-11-26 RX ORDER — AMLODIPINE BESYLATE 10 MG/1
10 TABLET ORAL DAILY
Refills: 0 | COMMUNITY
Start: 2018-11-15 | End: 2018-11-26 | Stop reason: DRUGHIGH

## 2018-11-26 RX ORDER — LEVOFLOXACIN 500 MG/1
TABLET, FILM COATED ORAL
COMMUNITY
Start: 2018-11-19 | End: 2018-11-26

## 2018-11-26 RX ORDER — OXYCODONE AND ACETAMINOPHEN 5; 325 MG/1; MG/1
1 TABLET ORAL
COMMUNITY
Start: 2018-11-15 | End: 2018-11-29

## 2018-11-26 RX ORDER — BUPROPION HYDROCHLORIDE 200 MG/1
100 TABLET, EXTENDED RELEASE ORAL 2 TIMES DAILY
COMMUNITY
End: 2019-04-04 | Stop reason: SDUPTHER

## 2018-11-26 RX ORDER — TAMSULOSIN HYDROCHLORIDE 0.4 MG/1
0.4 CAPSULE ORAL DAILY
Qty: 30 CAPSULE | Refills: 11 | Status: SHIPPED | OUTPATIENT
Start: 2018-11-26 | End: 2018-11-26

## 2018-11-26 RX ORDER — NITROGLYCERIN 0.4 MG/1
TABLET SUBLINGUAL
Refills: 1 | COMMUNITY
Start: 2018-09-13 | End: 2019-02-26

## 2018-11-26 RX ORDER — QUETIAPINE FUMARATE 100 MG/1
100 TABLET, FILM COATED ORAL NIGHTLY
Qty: 90 TABLET | Refills: 3 | Status: SHIPPED | OUTPATIENT
Start: 2018-11-26 | End: 2019-04-04 | Stop reason: SDUPTHER

## 2018-11-26 NOTE — PROGRESS NOTES
Transitional Care Note  Subjective:       Patient ID: Otis Rodriguez is a 78 y.o. male.  Chief Complaint: Hospital Follow Up    Family and/or Caretaker present at visit?  No.  Diagnostic tests reviewed/disposition: No diagnosic tests pending after this hospitalization.  Disease/illness education: yes  Home health/community services discussion/referrals: Patient does not have home health established from hospital visit.  They do not need home health.  If needed, we will set up home health for the patient.   Establishment or re-establishment of referral orders for community resources: No other necessary community resources.   Discussion with other health care providers: No discussion with other health care providers necessary.   HPI:  Patient here for follow up CABG 2 weeks ago.  Patient recovering well.  No complications.  Wound healing well.  Denies chest pain or BENZ.  Reports urinary frequency x 1 week.  Review of Systems   Constitutional: Negative for appetite change, chills, diaphoresis, fatigue, fever and unexpected weight change.   HENT: Negative for ear pain, sinus pressure, sore throat and trouble swallowing.    Eyes: Negative for visual disturbance.   Respiratory: Negative for cough, chest tightness, shortness of breath and wheezing.    Cardiovascular: Negative for chest pain, palpitations and leg swelling.   Gastrointestinal: Negative for abdominal distention, abdominal pain, constipation, diarrhea, nausea and vomiting.   Genitourinary: Positive for frequency. Negative for difficulty urinating, discharge, dysuria, hematuria and urgency.   Musculoskeletal: Negative for arthralgias, back pain and joint swelling.   Skin: Negative for rash.   Neurological: Negative for dizziness, light-headedness and headaches.   Hematological: Negative for adenopathy.   Psychiatric/Behavioral: Negative for dysphoric mood and sleep disturbance. The patient is not nervous/anxious.        Objective:      Physical Exam    Constitutional: He is oriented to person, place, and time. He appears well-developed and well-nourished.   HENT:   Head: Normocephalic and atraumatic.   Neck: Normal range of motion. Neck supple. No thyromegaly present.   Cardiovascular: Normal rate, regular rhythm and normal heart sounds.   No murmur heard.  Pulmonary/Chest: Effort normal and breath sounds normal. No respiratory distress. He has no wheezes.   Incision on chest wall without complications   Abdominal: Soft. Bowel sounds are normal. He exhibits no mass. There is no tenderness.   Musculoskeletal: He exhibits no edema.   Neurological: He is alert and oriented to person, place, and time.   Skin: Skin is warm and dry.   Psychiatric: He has a normal mood and affect.       Assessment:       1. Hx of CABG    2. Need for diphtheria, tetanus, pertussis, and Hib vaccination    3. Urinary frequency    4. Insomnia, unspecified type        Plan:       Otis was seen today for hospital follow up.    Diagnoses and all orders for this visit:    Hx of CABG  Patient doing well post-op.  Clinically asymptomatic.  Due to follow up with cardiologist this week.    Need for diphtheria, tetanus, pertussis, and Hib vaccination  -     Td Vaccine (Adult) - Preservative Free    Urinary frequency  -     POCT urinalysis, dipstick or tablet reag  -     Urine culture  -    Resume tamsulosin (FLOMAX) 0.4 mg Cap; Take 1 capsule (0.4 mg total) by mouth once daily.    Insomnia, unspecified type  -     QUEtiapine (SEROQUEL) 100 MG Tab; Take 1 tablet (100 mg total) by mouth every evening.

## 2018-11-27 LAB — BACTERIA UR CULT: NO GROWTH

## 2018-11-28 ENCOUNTER — TELEPHONE (OUTPATIENT)
Dept: FAMILY MEDICINE | Facility: CLINIC | Age: 78
End: 2018-11-28

## 2018-11-28 NOTE — TELEPHONE ENCOUNTER
Reports chest wall pain.  Recommend patent contact surgeon if he feels narcotics warranted.  Tylenol as needed for pain in the interim.

## 2018-11-28 NOTE — TELEPHONE ENCOUNTER
----- Message from Blanche Lopez sent at 11/28/2018  7:10 AM CST -----  Contact: [phone]  Pt is requesting to speak to you regarding  the pain that he is having since his surgery. Pls call pt [phone]. Thanks......Tabby

## 2018-11-28 NOTE — TELEPHONE ENCOUNTER
Pain in chest since having open heart surgery, he is almost out of pain medication and would like refill of oxycodone 5-325mg tablets; please advise

## 2018-12-04 ENCOUNTER — TELEPHONE (OUTPATIENT)
Dept: FAMILY MEDICINE | Facility: CLINIC | Age: 78
End: 2018-12-04

## 2018-12-04 NOTE — TELEPHONE ENCOUNTER
Attempt to contact patient to inquire of below at [facility] room #0304. No answer.

## 2018-12-04 NOTE — TELEPHONE ENCOUNTER
----- Message from Blanche Lopez sent at 12/4/2018  9:29 AM CST -----  Contact: self [phone]   room # 6403 B  Pt is requesting to speak to you regarding he is in the hospital ([facility]) they are trying to give a medicine he wants to speak to you about it before taking it. Pls call pt [phone] room # 9181 B. Thanks....Tabby

## 2018-12-10 ENCOUNTER — TELEPHONE (OUTPATIENT)
Dept: FAMILY MEDICINE | Facility: CLINIC | Age: 78
End: 2018-12-10

## 2018-12-10 NOTE — TELEPHONE ENCOUNTER
----- Message from Leeanne Peters sent at 12/10/2018  9:53 AM CST -----  Contact: Jimbo Aburto called to give FYI that patient is admitted to [facility] as of 12-. please call to at [phone]

## 2018-12-17 ENCOUNTER — OFFICE VISIT (OUTPATIENT)
Dept: FAMILY MEDICINE | Facility: CLINIC | Age: 78
End: 2018-12-17
Payer: MEDICARE

## 2018-12-17 VITALS
DIASTOLIC BLOOD PRESSURE: 60 MMHG | RESPIRATION RATE: 20 BRPM | BODY MASS INDEX: 19.21 KG/M2 | OXYGEN SATURATION: 99 % | WEIGHT: 122.38 LBS | TEMPERATURE: 98 F | SYSTOLIC BLOOD PRESSURE: 130 MMHG | HEIGHT: 67 IN | HEART RATE: 79 BPM

## 2018-12-17 DIAGNOSIS — G47.00 INSOMNIA, UNSPECIFIED TYPE: Primary | ICD-10-CM

## 2018-12-17 DIAGNOSIS — L84 FOOT CALLUS: ICD-10-CM

## 2018-12-17 PROCEDURE — 3078F DIAST BP <80 MM HG: CPT | Mod: CPTII,HCNC,S$GLB, | Performed by: FAMILY MEDICINE

## 2018-12-17 PROCEDURE — 99213 OFFICE O/P EST LOW 20 MIN: CPT | Mod: HCNC,S$GLB,, | Performed by: FAMILY MEDICINE

## 2018-12-17 PROCEDURE — 1101F PT FALLS ASSESS-DOCD LE1/YR: CPT | Mod: CPTII,HCNC,S$GLB, | Performed by: FAMILY MEDICINE

## 2018-12-17 PROCEDURE — 3075F SYST BP GE 130 - 139MM HG: CPT | Mod: CPTII,HCNC,S$GLB, | Performed by: FAMILY MEDICINE

## 2018-12-17 PROCEDURE — 99999 PR PBB SHADOW E&M-EST. PATIENT-LVL III: CPT | Mod: PBBFAC,HCNC,, | Performed by: FAMILY MEDICINE

## 2018-12-17 RX ORDER — CLOPIDOGREL BISULFATE 75 MG/1
75 TABLET ORAL DAILY
COMMUNITY
Start: 2018-12-11

## 2018-12-17 RX ORDER — ATORVASTATIN CALCIUM 80 MG/1
40 TABLET, FILM COATED ORAL DAILY
COMMUNITY
Start: 2018-12-11 | End: 2019-12-12 | Stop reason: SDUPTHER

## 2018-12-17 RX ORDER — LISINOPRIL 20 MG/1
20 TABLET ORAL
COMMUNITY
Start: 2018-12-11 | End: 2019-02-26

## 2018-12-17 RX ORDER — AMLODIPINE BESYLATE 10 MG/1
10 TABLET ORAL
COMMUNITY
Start: 2018-12-11 | End: 2019-09-25 | Stop reason: SDUPTHER

## 2018-12-17 RX ORDER — HYDROXYZINE PAMOATE 25 MG/1
25 CAPSULE ORAL EVERY 8 HOURS PRN
Qty: 30 CAPSULE | Refills: 5 | Status: SHIPPED | OUTPATIENT
Start: 2018-12-17 | End: 2019-02-26

## 2018-12-18 NOTE — PROGRESS NOTES
Subjective:       Patient ID: Otis Rodriguez     Chief Complaint: Hospital Follow Up      Bob Rodriguez is a 78 y.o. male here for follow up chronic insomnia. Patient reports no improvement with Remeron or Seroquel.  Also requesting referral to podiatry fo callu    Review of patient's allergies indicates:  No Known Allergies    Current Outpatient Medications:     amLODIPine (NORVASC) 10 MG tablet, Take 10 mg by mouth., Disp: , Rfl:     aspirin (ECOTRIN) 325 MG EC tablet, TAKE 1 TABLET (325 MG TOTAL) BY MOUTH ONCE DAILY., Disp: 90 tablet, Rfl: 3    atorvastatin (LIPITOR) 40 MG tablet, Take 1 tablet (40 mg total) by mouth every evening., Disp: 90 tablet, Rfl: 3    atorvastatin (LIPITOR) 80 MG tablet, Take 80 mg by mouth., Disp: , Rfl:     buPROPion (WELLBUTRIN SR) 200 MG SR12, Take 100 mg by mouth 2 (two) times daily., Disp: , Rfl:     clopidogrel (PLAVIX) 75 mg tablet, Take 75 mg by mouth., Disp: , Rfl:     fluticasone (FLONASE) 50 mcg/actuation nasal spray, 2 sprays (100 mcg total) by Each Nare route as needed for Rhinitis., Disp: 1 Bottle, Rfl: 11    levocetirizine (XYZAL) 5 MG tablet, Take 1 tablet (5 mg total) by mouth every evening., Disp: 90 tablet, Rfl: 3    lisinopril (PRINIVIL,ZESTRIL) 20 MG tablet, Take 20 mg by mouth., Disp: , Rfl:     metoprolol succinate (TOPROL-XL) 50 MG 24 hr tablet, Take 1 tablet (50 mg total) by mouth 2 (two) times daily., Disp: 60 tablet, Rfl: 4    nicotine polacrilex 2 MG Lozg, Take 1 lozenge (2 mg total) by mouth as needed., Disp: 108 lozenge, Rfl: 0    nitroGLYCERIN (NITROSTAT) 0.4 MG SL tablet, TAKE 1 TABLET UNDER THE TONGUE EVERY 5 MINUTES UPT TO 3 TIMES AS NEEDED FOR CHEST PAIN, Disp: , Rfl: 1    QUEtiapine (SEROQUEL) 100 MG Tab, Take 1 tablet (100 mg total) by mouth every evening., Disp: 90 tablet, Rfl: 3    tamsulosin (FLOMAX) 0.4 mg Cap, Take 1 capsule (0.4 mg total) by mouth once daily., Disp: 90 capsule, Rfl: 3    hydrOXYzine pamoate (VISTARIL) 25 MG Cap,  Take 1 capsule (25 mg total) by mouth every 8 (eight) hours as needed., Disp: 30 capsule, Rfl: 5    lisinopril 10 MG tablet, Take 1 tablet (10 mg total) by mouth once daily., Disp: 90 tablet, Rfl: 3    Past Medical History:   Diagnosis Date    CAD (coronary artery disease) 1/12/2015    Cerebrovascular disease 1/12/2015    Depression     Essential hypertension, benign 1/12/2015    Other and unspecified hyperlipidemia 1/12/2015     Review of Systems   Respiratory: Positive for shortness of breath.    Cardiovascular: Negative for chest pain.       Objective:      Physical Exam   Constitutional: He appears well-developed and well-nourished.   Feet:   Right Foot:   Skin Integrity: Positive for callus. Negative for ulcer.   Left Foot:   Skin Integrity: Negative for ulcer or callus.       Assessment:       1. Insomnia, unspecified type    2. Foot callus        Plan:       Otis was seen today for hospital follow up.    Diagnoses and all orders for this visit:    Insomnia, unspecified type  -     hydrOXYzine pamoate (VISTARIL) 25 MG Cap; Take 1 capsule (25 mg total) by mouth every 8 (eight) hours as needed.    Foot callus  -     Ambulatory referral to Podiatry

## 2019-01-03 ENCOUNTER — HOSPITAL ENCOUNTER (EMERGENCY)
Facility: HOSPITAL | Age: 79
Discharge: HOME | End: 2019-01-03
Attending: EMERGENCY MEDICINE
Payer: MEDICARE

## 2019-01-03 VITALS
WEIGHT: 122 LBS | HEIGHT: 67 IN | SYSTOLIC BLOOD PRESSURE: 148 MMHG | HEART RATE: 61 BPM | TEMPERATURE: 98 F | RESPIRATION RATE: 17 BRPM | OXYGEN SATURATION: 98 % | BODY MASS INDEX: 19.15 KG/M2 | DIASTOLIC BLOOD PRESSURE: 68 MMHG

## 2019-01-03 DIAGNOSIS — R07.89 CHEST WALL PAIN: ICD-10-CM

## 2019-01-03 DIAGNOSIS — R07.9 CHEST PAIN: Primary | ICD-10-CM

## 2019-01-03 LAB
ALBUMIN SERPL BCP-MCNC: 3.6 G/DL
ALP SERPL-CCNC: 106 U/L
ALT SERPL W/O P-5'-P-CCNC: 29 U/L
ANION GAP SERPL CALC-SCNC: 8 MMOL/L
AST SERPL-CCNC: 25 U/L
BASOPHILS # BLD AUTO: 0.01 K/UL
BASOPHILS NFR BLD: 0.2 %
BILIRUB SERPL-MCNC: 0.2 MG/DL
BNP SERPL-MCNC: 233 PG/ML
BUN SERPL-MCNC: 27 MG/DL
CALCIUM SERPL-MCNC: 9.1 MG/DL
CHLORIDE SERPL-SCNC: 113 MMOL/L
CO2 SERPL-SCNC: 24 MMOL/L
CREAT SERPL-MCNC: 1.3 MG/DL
DIFFERENTIAL METHOD: ABNORMAL
EOSINOPHIL # BLD AUTO: 0.3 K/UL
EOSINOPHIL NFR BLD: 4.4 %
ERYTHROCYTE [DISTWIDTH] IN BLOOD BY AUTOMATED COUNT: 17.5 %
EST. GFR  (AFRICAN AMERICAN): >60 ML/MIN/1.73 M^2
EST. GFR  (NON AFRICAN AMERICAN): 53 ML/MIN/1.73 M^2
GLUCOSE SERPL-MCNC: 116 MG/DL
HCT VFR BLD AUTO: 33.1 %
HGB BLD-MCNC: 10.7 G/DL
LYMPHOCYTES # BLD AUTO: 1.6 K/UL
LYMPHOCYTES NFR BLD: 24.5 %
MCH RBC QN AUTO: 30.9 PG
MCHC RBC AUTO-ENTMCNC: 32.3 G/DL
MCV RBC AUTO: 96 FL
MONOCYTES # BLD AUTO: 0.7 K/UL
MONOCYTES NFR BLD: 10.8 %
NEUTROPHILS # BLD AUTO: 3.9 K/UL
NEUTROPHILS NFR BLD: 60.1 %
PLATELET # BLD AUTO: 256 K/UL
PMV BLD AUTO: 9.5 FL
POTASSIUM SERPL-SCNC: 3.6 MMOL/L
PROT SERPL-MCNC: 6.8 G/DL
RBC # BLD AUTO: 3.46 M/UL
SODIUM SERPL-SCNC: 145 MMOL/L
TROPONIN I SERPL DL<=0.01 NG/ML-MCNC: 0.03 NG/ML
WBC # BLD AUTO: 6.41 K/UL

## 2019-01-03 PROCEDURE — 99285 EMERGENCY DEPT VISIT HI MDM: CPT | Mod: HCNC

## 2019-01-03 PROCEDURE — 84484 ASSAY OF TROPONIN QUANT: CPT | Mod: HCNC

## 2019-01-03 PROCEDURE — 93005 ELECTROCARDIOGRAM TRACING: CPT | Mod: HCNC

## 2019-01-03 PROCEDURE — 85025 COMPLETE CBC W/AUTO DIFF WBC: CPT | Mod: HCNC

## 2019-01-03 PROCEDURE — 93010 ELECTROCARDIOGRAM REPORT: CPT | Mod: HCNC,,, | Performed by: INTERNAL MEDICINE

## 2019-01-03 PROCEDURE — 83880 ASSAY OF NATRIURETIC PEPTIDE: CPT | Mod: HCNC

## 2019-01-03 PROCEDURE — 80053 COMPREHEN METABOLIC PANEL: CPT | Mod: HCNC

## 2019-01-03 PROCEDURE — 93010 EKG 12-LEAD: ICD-10-PCS | Mod: HCNC,,, | Performed by: INTERNAL MEDICINE

## 2019-01-03 RX ORDER — IBUPROFEN 600 MG/1
600 TABLET ORAL EVERY 6 HOURS PRN
Qty: 20 TABLET | Refills: 0 | Status: SHIPPED | OUTPATIENT
Start: 2019-01-03 | End: 2019-02-26

## 2019-01-03 NOTE — ED PROVIDER NOTES
Encounter Date: 1/3/2019       History     Chief Complaint   Patient presents with    Post-op Problem     CABG one month ago; reports pain at site     This is an emergent evaluation of a 78-year-old male who presents via EMS with chest pain.  The patient has a history of coronary artery disease and is status post CABG.  He reports intermittent chest pain since his last CABG which was evidently 2 months ago.  He had chest pain earlier tonight (8/10 at most severe), but states he is pain-free now (2:51 AM).      I reviewed patient's [facility] and [facility] notes. He is followed by [facility] (Dr. Jase Miller).    PMH: CAD, HTN, DLD, CVA, carotid stenosis, CKD stage 2, back pain, COPD  PSH: cardiac cath (2010, 2011, 12/3/18, 12/10/18), CABG (11/12/18)    Medications    amLODIPine (NORVASC) 10 MG tablet Take 1 tablet (10 mg total) by mouth daily.    atorvastatin (LIPITOR) 80 MG tablet Take 1 tablet (80 mg total) by mouth nightly.   buPROPion (WELLBUTRIN SR) 200 MG 12 hr tablet Take 200 mg by mouth 2 (two) times daily.   clopidogrel (PLAVIX) 75 mg tablet Take 1 tablet (75 mg total) by mouth daily.    levocetirizine (XYZAL) 5 MG tablet Take 5 mg by mouth every evening.   lisinopril (PRINIVIL,ZESTRIL) 20 MG tablet Take 1 tablet (20 mg total) by mouth daily.    metoprolol (TOPROL-XL) 50 MG 24 hr tablet Take 1 tablet (50 mg total) by mouth 2 (two) times daily.   tamsulosin (FLOMAX) 0.4 mg 24 hr capsule Take 1 capsule by mouth nightly.   aspirin 81 MG chewable tablet Take 1 tablet (81 mg total) by mouth daily.   omeprazole (PRILOSEC) 20 MG capsule Take 20 mg by mouth daily.    temazepam (RESTORIL) 7.5 MG capsule Take 1 capsule by mouth nightly as needed.          Review of patient's allergies indicates:  No Known Allergies  Past Medical History:   Diagnosis Date    CAD (coronary artery disease) 1/12/2015    Cerebrovascular disease 1/12/2015    Depression     Essential  "hypertension, benign 2015    Other and unspecified hyperlipidemia 2015     Past Surgical History:   Procedure Laterality Date    stents      stents in heart in      Family History   Problem Relation Age of Onset    Cancer Father     Alcohol abuse Brother     Kidney disease Brother      Social History     Tobacco Use    Smoking status: Current Some Day Smoker     Packs/day: 0.50     Types: Cigarettes     Last attempt to quit: 2017     Years since quittin.9    Smokeless tobacco: Never Used    Tobacco comment: daily marijuana, history of crack cocaine abuse    Substance Use Topics    Alcohol use: Yes     Alcohol/week: 0.0 oz     Frequency: 2-4 times a month     Comment: History of heavy ETOH in past    Drug use: Yes     Types: Marijuana, "Crack" cocaine     Review of Systems   Constitutional: Negative for fever.   HENT: Negative for sore throat.    Eyes: Negative for pain.   Respiratory: Negative for shortness of breath.    Cardiovascular: Positive for chest pain.   Gastrointestinal: Negative for abdominal pain and nausea.   Genitourinary: Negative for dysuria.   Musculoskeletal: Negative for neck pain.   Skin: Negative for rash.   Neurological: Negative for weakness.       Physical Exam     Initial Vitals [19 0039]   BP Pulse Resp Temp SpO2   137/70 70 16 98.9 °F (37.2 °C) 98 %      MAP       --         Physical Exam    Nursing note and vitals reviewed.  Constitutional: He appears well-developed and well-nourished. He is not diaphoretic.   Sleeping comfortably on ED stretcher. Older male. Thin.   HENT:   Head: Normocephalic and atraumatic.   Edentulous.   Eyes: Conjunctivae and EOM are normal. Pupils are equal, round, and reactive to light.   Neck: Normal range of motion. Neck supple.   Cardiovascular: Normal rate, regular rhythm and intact distal pulses.   Murmur heard.  Pulmonary/Chest: Breath sounds normal. No respiratory distress. He has no wheezes. He has no rhonchi. He " has no rales.   Well-healed sternotomy scar with no erythema, discharge skin breakdown.   Abdominal: Soft. Bowel sounds are normal. He exhibits no distension. There is no tenderness. There is no rebound and no guarding.   Musculoskeletal: Normal range of motion. He exhibits no edema or tenderness.   Neurological: He is alert and oriented to person, place, and time. He has normal strength.   Moving all extremities   Skin: Skin is warm and dry.   Psychiatric: He has a normal mood and affect.         ED Course   Procedures  Labs Reviewed   CBC W/ AUTO DIFFERENTIAL - Abnormal; Notable for the following components:       Result Value    RBC 3.46 (*)     Hemoglobin 10.7 (*)     Hematocrit 33.1 (*)     RDW 17.5 (*)     All other components within normal limits   COMPREHENSIVE METABOLIC PANEL - Abnormal; Notable for the following components:    Chloride 113 (*)     Glucose 116 (*)     BUN, Bld 27 (*)     eGFR if non  53 (*)     All other components within normal limits   B-TYPE NATRIURETIC PEPTIDE - Abnormal; Notable for the following components:     (*)     All other components within normal limits   TROPONIN I - Abnormal; Notable for the following components:    Troponin I 0.029 (*)     All other components within normal limits     EKG Readings: (Independently Interpreted)   01:04: NSR, HR 68. LVH. TWI in II, III, aVF, V4-V6. No STEMI.        Imaging Results          X-Ray Chest AP Portable (Final result)  Result time 01/03/19 03:18:55   Procedure changed from X-Ray Chest PA And Lateral     Final result by Gaby Ballard MD (01/03/19 03:18:55)                 Impression:      Interval postoperative change of median sternotomy and CABG procedure with additional stable chronic findings.  No is radiographic evidence of superimposed acute cardiopulmonary process on this single view of the chest.      Electronically signed by: Gaby Ballard MD  Date:    01/03/2019  Time:    03:18             Narrative:     EXAMINATION:  XR CHEST AP PORTABLE    CLINICAL HISTORY:  pain; Chest pain, unspecified    TECHNIQUE:  Single frontal view of the chest was performed.    COMPARISON:  02/28/2018    FINDINGS:  Monitoring leads overlie the chest.  There is postoperative change of interval median sternotomy and CABG procedure.  Coronary artery stent in place.  The cardiomediastinal silhouette remains mildly prominent.  The lungs are symmetrically expanded with persistent coarse interstitial attenuation, unchanged.  Findings can be seen in the setting of COPD.  There is no evidence of focal consolidation, significant pleural fluid or pneumothorax.  Visualized osseous structures are intact.                              X-Rays:   Independently Interpreted Readings:   Other Readings:  CXR NAD. S/p sternotomy.    Medical Decision Making:   History:   Old Medical Records: I decided to obtain old medical records.  Old Records Summarized: records from previous admission(s), records from clinic visits and records from another hospital.  Initial Assessment:   78 y.o. Male with intermittent chest pain since CABG 2 months ago.  Differential Diagnosis:   Differential includes ACS, CHF, PE, arrhythmia, PTX, aortic dissection, PNA, musculoskeletal CP, GERD, anxiety, other.   Independently Interpreted Test(s):   I have ordered and independently interpreted X-rays - see prior notes.  I have ordered and independently interpreted EKG Reading(s) - see prior notes  Clinical Tests:   Lab Tests: Ordered and Reviewed  Radiological Study: Ordered and Reviewed  Medical Tests: Ordered and Reviewed  ED Management:  EKG with diffuse TWIs but not STEMI.     CXR NAD.    CBC and CMP reassuring.  and troponin 0.029, improved from [facility] 1 month ago. Patient arrived at 00:31 and labs were not collected until 02:23 so this was effectively a two-hour troponin.     I suspect chest wall pain rather than ACS or other acute pathology. Patient has VSS and is in no  distress. He is stable for f/u to his cardiologist at [facility].                      Clinical Impression:   The primary encounter diagnosis was Chest pain. A diagnosis of Chest wall pain was also pertinent to this visit.            Shell Brennan MD  01/03/19 1919

## 2019-01-03 NOTE — ED TRIAGE NOTES
received  Pt to room 9. Pt appears to be asleep. He responds easily to verbal stimuli. States he was having some pain earlier but none now. At present pt denies any pain or discomfort. Denies nausea or vomiting. States the pain he had he has had intermittently since surgery

## 2019-02-18 ENCOUNTER — TELEPHONE (OUTPATIENT)
Dept: FAMILY MEDICINE | Facility: CLINIC | Age: 79
End: 2019-02-18

## 2019-02-18 NOTE — TELEPHONE ENCOUNTER
----- Message from Meghna Urrutia sent at 2/18/2019 10:45 AM CST -----  Contact: pt's mendy with [facility] [phone]  Name of Who is Calling: mendy      What is the request in detail: admitted [facility] for mild cardio infarction on 2/16/19. Please call      Can the clinic reply by [facility]: pt's mendy with [facility] [phone]      What Number [facility] Back [facility]: pt's mendy with [facility] [phone]

## 2019-02-26 ENCOUNTER — OFFICE VISIT (OUTPATIENT)
Dept: FAMILY MEDICINE | Facility: CLINIC | Age: 79
End: 2019-02-26
Payer: MEDICARE

## 2019-02-26 VITALS
HEIGHT: 67 IN | SYSTOLIC BLOOD PRESSURE: 128 MMHG | TEMPERATURE: 98 F | OXYGEN SATURATION: 97 % | RESPIRATION RATE: 16 BRPM | BODY MASS INDEX: 19.1 KG/M2 | DIASTOLIC BLOOD PRESSURE: 62 MMHG | HEART RATE: 69 BPM | WEIGHT: 121.69 LBS

## 2019-02-26 DIAGNOSIS — G47.00 INSOMNIA, UNSPECIFIED TYPE: ICD-10-CM

## 2019-02-26 DIAGNOSIS — I70.0 AORTIC ATHEROSCLEROSIS: ICD-10-CM

## 2019-02-26 DIAGNOSIS — I10 ESSENTIAL HYPERTENSION, BENIGN: ICD-10-CM

## 2019-02-26 DIAGNOSIS — I25.10 CORONARY ARTERY DISEASE INVOLVING NATIVE CORONARY ARTERY WITHOUT ANGINA PECTORIS, UNSPECIFIED WHETHER NATIVE OR TRANSPLANTED HEART: Primary | ICD-10-CM

## 2019-02-26 DIAGNOSIS — F33.2 MAJOR DEPRESSIVE DISORDER, RECURRENT, SEVERE WITHOUT PSYCHOTIC FEATURES: ICD-10-CM

## 2019-02-26 PROCEDURE — 3078F PR MOST RECENT DIASTOLIC BLOOD PRESSURE < 80 MM HG: ICD-10-PCS | Mod: HCNC,CPTII,S$GLB, | Performed by: PHYSICIAN ASSISTANT

## 2019-02-26 PROCEDURE — 99215 OFFICE O/P EST HI 40 MIN: CPT | Mod: HCNC,S$GLB,, | Performed by: PHYSICIAN ASSISTANT

## 2019-02-26 PROCEDURE — 99499 UNLISTED E&M SERVICE: CPT | Mod: S$PBB,,, | Performed by: PHYSICIAN ASSISTANT

## 2019-02-26 PROCEDURE — 1101F PT FALLS ASSESS-DOCD LE1/YR: CPT | Mod: HCNC,CPTII,S$GLB, | Performed by: PHYSICIAN ASSISTANT

## 2019-02-26 PROCEDURE — 3074F SYST BP LT 130 MM HG: CPT | Mod: HCNC,CPTII,S$GLB, | Performed by: PHYSICIAN ASSISTANT

## 2019-02-26 PROCEDURE — 3078F DIAST BP <80 MM HG: CPT | Mod: HCNC,CPTII,S$GLB, | Performed by: PHYSICIAN ASSISTANT

## 2019-02-26 PROCEDURE — 99215 PR OFFICE/OUTPT VISIT, EST, LEVL V, 40-54 MIN: ICD-10-PCS | Mod: HCNC,S$GLB,, | Performed by: PHYSICIAN ASSISTANT

## 2019-02-26 PROCEDURE — 99499 RISK ADDL DX/OHS AUDIT: ICD-10-PCS | Mod: S$PBB,,, | Performed by: PHYSICIAN ASSISTANT

## 2019-02-26 PROCEDURE — 99999 PR PBB SHADOW E&M-EST. PATIENT-LVL V: ICD-10-PCS | Mod: PBBFAC,HCNC,, | Performed by: PHYSICIAN ASSISTANT

## 2019-02-26 PROCEDURE — 1101F PR PT FALLS ASSESS DOC 0-1 FALLS W/OUT INJ PAST YR: ICD-10-PCS | Mod: HCNC,CPTII,S$GLB, | Performed by: PHYSICIAN ASSISTANT

## 2019-02-26 PROCEDURE — 99999 PR PBB SHADOW E&M-EST. PATIENT-LVL V: CPT | Mod: PBBFAC,HCNC,, | Performed by: PHYSICIAN ASSISTANT

## 2019-02-26 PROCEDURE — 3074F PR MOST RECENT SYSTOLIC BLOOD PRESSURE < 130 MM HG: ICD-10-PCS | Mod: HCNC,CPTII,S$GLB, | Performed by: PHYSICIAN ASSISTANT

## 2019-02-26 RX ORDER — RANOLAZINE 500 MG/1
500 TABLET, EXTENDED RELEASE ORAL 2 TIMES DAILY
COMMUNITY
End: 2019-07-31

## 2019-02-26 RX ORDER — LISINOPRIL 20 MG/1
20 TABLET ORAL DAILY
Qty: 90 TABLET | Refills: 0 | Status: SHIPPED | OUTPATIENT
Start: 2019-02-26 | End: 2019-08-01 | Stop reason: DRUGHIGH

## 2019-02-26 RX ORDER — METOPROLOL SUCCINATE 50 MG/1
50 TABLET, EXTENDED RELEASE ORAL DAILY
Qty: 90 TABLET | Refills: 0 | Status: SHIPPED | OUTPATIENT
Start: 2019-02-26

## 2019-02-26 RX ORDER — METOPROLOL SUCCINATE 50 MG/1
50 TABLET, EXTENDED RELEASE ORAL DAILY
Qty: 90 TABLET | Refills: 0
Start: 2019-02-26 | End: 2019-02-26 | Stop reason: SDUPTHER

## 2019-02-26 RX ORDER — HYDRALAZINE HYDROCHLORIDE 25 MG/1
25 TABLET, FILM COATED ORAL 3 TIMES DAILY
COMMUNITY

## 2019-02-26 NOTE — PROGRESS NOTES
Subjective:       Patient ID: Otis Rodriguez is a 78 y.o. male.    Chief Complaint: Hospital Follow Up (Nstemi, SOB. HTN)    HPI: 77 yo male with CAD, Dementia, depression presents for hospital follow up. Pt admitted to [place] on  for NSTEMI. Had an angiogram and a stent placed in the left main LAD system. He has been undergoing cardiac rehab. His cardiologist is Dr. Reynolds at [facility]. He was re-admitted to [facility] on  due to left sided chest pain. Troponin's elevated, Angiogram performed, showed patent stents. He was then discharged. He has intermittent chest pain. SOB has improved since stent placement.   Pt has a long history of medication miss management. Lipitor was changed from 40 to 80 mg. Patient currently has both bottles. Lisinopril from 10 mg to 20 mg pt only had 10mg. Many of his bottles also have mixed medications (amlodipine 10 and 5 mg together). He also states he is taking hydralazine BID instead of TID. He states he gave up cigarettes but now smokes marijuana.     Social History     Socioeconomic History    Marital status:      Spouse name: Not on file    Number of children: Not on file    Years of education: Not on file    Highest education level: Not on file   Social Needs    Financial resource strain: Not on file    Food insecurity - worry: Not on file    Food insecurity - inability: Not on file    Transportation needs - medical: Not on file    Transportation needs - non-medical: Not on file   Occupational History    Not on file   Tobacco Use    Smoking status: Current Some Day Smoker     Packs/day: 0.50     Types: Cigarettes     Last attempt to quit: 2017     Years since quittin.0    Smokeless tobacco: Never Used    Tobacco comment: daily marijuana, history of crack cocaine abuse 2010   Substance and Sexual Activity    Alcohol use: Yes     Alcohol/week: 0.0 oz     Frequency: 2-4 times a month     Comment: History of heavy ETOH in past    Drug  "use: Yes     Types: Marijuana, "Crack" cocaine    Sexual activity: Not Currently     Partners: Female     Comment: divorced9/12/17   Other Topics Concern    Not on file   Social History Narrative    Not on file       Review of Systems   Respiratory: Negative for shortness of breath.    Cardiovascular: Positive for chest pain (none today).   Psychiatric/Behavioral: Positive for dysphoric mood.       Objective:      Physical Exam   Constitutional: He is oriented to person, place, and time. He appears well-developed and well-nourished.   HENT:   Head: Normocephalic and atraumatic.   Cardiovascular: Normal rate.   Pulmonary/Chest: Effort normal and breath sounds normal.       Neurological: He is alert and oriented to person, place, and time.   Psychiatric: He has a normal mood and affect. His behavior is normal.   Vitals reviewed.      Assessment:       1. Coronary artery disease involving native coronary artery without angina pectoris, unspecified whether native or transplanted heart    2. Essential hypertension, benign    3. Aortic atherosclerosis    4. Major depressive disorder, recurrent, severe without psychotic features    5. Insomnia, unspecified type        Plan:         Otis was seen today for hospital follow up.    Diagnoses and all orders for this visit:    Coronary artery disease involving native coronary artery without angina pectoris, unspecified whether native or transplanted heart  -     Discontinue: metoprolol succinate (TOPROL-XL) 50 MG 24 hr tablet; Take 1 tablet (50 mg total) by mouth once daily.  -     metoprolol succinate (TOPROL-XL) 50 MG 24 hr tablet; Take 1 tablet (50 mg total) by mouth once daily.  -     Pt was advised he must stop smoking marijuana as same risks are present. He agrees  -     He was advise he has a f/u with cardiology on 3/13. He is to continue cardiac rehab. He will f/u with PCP in 1 month, advised to bring meds to every visit     Essential hypertension, benign  -     " lisinopril (PRINIVIL,ZESTRIL) 20 MG tablet; Take 1 tablet (20 mg total) by mouth once daily. For blood pressure    Aortic atherosclerosis  -  Continue aspirin and statin    Major depressive disorder, recurrent, severe without psychotic features  -     Ambulatory referral to Psychiatry    Insomnia, unspecified type  -     Ambulatory referral to Psychiatry    Went through all of patients medication. Threw away lipitor 40, lisinopril 10, hyroxyzine (confusion with hydralazine), old metoprolol and AMLODIPINE (patient has mixed pills in each bottle). Pt was advised to never combine medications from different bottles. He was advised to throw away any duplicates at home.     50 minutes was spent with the patient with over half the time discussing correct medication, medication use, not mixing pills, smoking cessation, and diet

## 2019-03-26 ENCOUNTER — TELEPHONE (OUTPATIENT)
Dept: FAMILY MEDICINE | Facility: CLINIC | Age: 79
End: 2019-03-26

## 2019-03-26 NOTE — TELEPHONE ENCOUNTER
Spoke to representative and informed them we have not received information; correct fax number provided; they will fax again

## 2019-03-26 NOTE — TELEPHONE ENCOUNTER
----- Message from Nai Kwon sent at 3/26/2019 10:36 AM CDT -----  Contact: Cyrus  Calling to confirm the office received the paperwork for the patient appointment tomorrow. Please call at [phone].

## 2019-03-27 ENCOUNTER — OFFICE VISIT (OUTPATIENT)
Dept: FAMILY MEDICINE | Facility: CLINIC | Age: 79
End: 2019-03-27
Payer: MEDICARE

## 2019-03-27 VITALS
OXYGEN SATURATION: 99 % | SYSTOLIC BLOOD PRESSURE: 126 MMHG | BODY MASS INDEX: 18.37 KG/M2 | HEART RATE: 79 BPM | TEMPERATURE: 98 F | DIASTOLIC BLOOD PRESSURE: 66 MMHG | RESPIRATION RATE: 16 BRPM | HEIGHT: 67 IN | WEIGHT: 117.06 LBS

## 2019-03-27 DIAGNOSIS — G47.00 INSOMNIA, UNSPECIFIED TYPE: ICD-10-CM

## 2019-03-27 DIAGNOSIS — F17.210 CIGARETTE NICOTINE DEPENDENCE WITHOUT COMPLICATION: ICD-10-CM

## 2019-03-27 DIAGNOSIS — R29.6 FREQUENT FALLS: Primary | ICD-10-CM

## 2019-03-27 DIAGNOSIS — I10 ESSENTIAL HYPERTENSION, BENIGN: ICD-10-CM

## 2019-03-27 PROBLEM — J41.0 SIMPLE CHRONIC BRONCHITIS: Status: ACTIVE | Noted: 2019-03-27

## 2019-03-27 PROCEDURE — 1101F PR PT FALLS ASSESS DOC 0-1 FALLS W/OUT INJ PAST YR: ICD-10-PCS | Mod: HCNC,CPTII,S$GLB, | Performed by: FAMILY MEDICINE

## 2019-03-27 PROCEDURE — 99214 PR OFFICE/OUTPT VISIT, EST, LEVL IV, 30-39 MIN: ICD-10-PCS | Mod: HCNC,S$GLB,, | Performed by: FAMILY MEDICINE

## 2019-03-27 PROCEDURE — 99999 PR PBB SHADOW E&M-EST. PATIENT-LVL IV: ICD-10-PCS | Mod: PBBFAC,HCNC,, | Performed by: FAMILY MEDICINE

## 2019-03-27 PROCEDURE — 99214 OFFICE O/P EST MOD 30 MIN: CPT | Mod: HCNC,S$GLB,, | Performed by: FAMILY MEDICINE

## 2019-03-27 PROCEDURE — 3074F PR MOST RECENT SYSTOLIC BLOOD PRESSURE < 130 MM HG: ICD-10-PCS | Mod: HCNC,CPTII,S$GLB, | Performed by: FAMILY MEDICINE

## 2019-03-27 PROCEDURE — 3078F DIAST BP <80 MM HG: CPT | Mod: HCNC,CPTII,S$GLB, | Performed by: FAMILY MEDICINE

## 2019-03-27 PROCEDURE — 99999 PR PBB SHADOW E&M-EST. PATIENT-LVL IV: CPT | Mod: PBBFAC,HCNC,, | Performed by: FAMILY MEDICINE

## 2019-03-27 PROCEDURE — 1101F PT FALLS ASSESS-DOCD LE1/YR: CPT | Mod: HCNC,CPTII,S$GLB, | Performed by: FAMILY MEDICINE

## 2019-03-27 PROCEDURE — 3074F SYST BP LT 130 MM HG: CPT | Mod: HCNC,CPTII,S$GLB, | Performed by: FAMILY MEDICINE

## 2019-03-27 PROCEDURE — 3078F PR MOST RECENT DIASTOLIC BLOOD PRESSURE < 80 MM HG: ICD-10-PCS | Mod: HCNC,CPTII,S$GLB, | Performed by: FAMILY MEDICINE

## 2019-03-27 RX ORDER — ERYTHROMYCIN 500 MG/1
TABLET, DELAYED RELEASE ORAL
Refills: 10 | COMMUNITY
Start: 2019-03-14 | End: 2019-03-27 | Stop reason: SDUPTHER

## 2019-03-27 RX ORDER — LISINOPRIL 20 MG/1
20 TABLET ORAL DAILY
Qty: 90 TABLET | Refills: 0 | OUTPATIENT
Start: 2019-03-27

## 2019-03-27 RX ORDER — QUETIAPINE FUMARATE 100 MG/1
100 TABLET, FILM COATED ORAL NIGHTLY
Qty: 90 TABLET | Refills: 3 | OUTPATIENT
Start: 2019-03-27 | End: 2020-03-26

## 2019-03-27 RX ORDER — LISINOPRIL 10 MG/1
10 TABLET ORAL DAILY
Refills: 1 | COMMUNITY
Start: 2019-03-02 | End: 2019-03-27

## 2019-03-29 ENCOUNTER — OUTPATIENT CASE MANAGEMENT (OUTPATIENT)
Dept: ADMINISTRATIVE | Facility: OTHER | Age: 79
End: 2019-03-29

## 2019-03-29 NOTE — LETTER
April 1, 2019    Otis Rodriguez  [address]  [place] [address]             [facility]  [address]  [place] [address] Dear MrRocío Rodriguez,      I have been assigned as your  with [facility] Management Department. We received a referral to call you to discuss your medical history. I attempted to reach you by telephone, but I was unsuccessful. Please call our department so that we can go over some questions with you regarding your health.     The Outpatient Case Management department can be reached at [phone] from 8:00am to 4:30pm on Monday thru Friday. [facility] also has a program where a nurse is available 24/7 to answer questions or provider medical advice. Their number is [phone].     Thanks,      Chantel Russ, RN  Outpatient Case Management

## 2019-04-02 ENCOUNTER — TELEPHONE (OUTPATIENT)
Dept: SMOKING CESSATION | Facility: CLINIC | Age: 79
End: 2019-04-02

## 2019-04-02 NOTE — TELEPHONE ENCOUNTER
[facility]- called to check on patient, no show for intake appointment. Patient wants to reschedule appointment at a later time. [phone]

## 2019-04-04 ENCOUNTER — OUTPATIENT CASE MANAGEMENT (OUTPATIENT)
Dept: ADMINISTRATIVE | Facility: OTHER | Age: 79
End: 2019-04-04

## 2019-04-04 DIAGNOSIS — J30.9 ALLERGIC RHINITIS, UNSPECIFIED SEASONALITY, UNSPECIFIED TRIGGER: ICD-10-CM

## 2019-04-04 DIAGNOSIS — G47.00 INSOMNIA, UNSPECIFIED TYPE: ICD-10-CM

## 2019-04-04 DIAGNOSIS — R35.0 URINARY FREQUENCY: ICD-10-CM

## 2019-04-04 RX ORDER — TAMSULOSIN HYDROCHLORIDE 0.4 MG/1
0.4 CAPSULE ORAL DAILY
Qty: 90 CAPSULE | Refills: 3 | Status: SHIPPED | OUTPATIENT
Start: 2019-04-04 | End: 2019-12-23 | Stop reason: SDUPTHER

## 2019-04-04 RX ORDER — BUPROPION HYDROCHLORIDE 200 MG/1
200 TABLET, EXTENDED RELEASE ORAL 2 TIMES DAILY
Qty: 180 TABLET | Refills: 1 | Status: SHIPPED | OUTPATIENT
Start: 2019-04-04 | End: 2021-03-19 | Stop reason: SDUPTHER

## 2019-04-04 RX ORDER — FLUTICASONE PROPIONATE 50 MCG
2 SPRAY, SUSPENSION (ML) NASAL
Qty: 1 BOTTLE | Refills: 11 | Status: SHIPPED | OUTPATIENT
Start: 2019-04-04 | End: 2020-02-05 | Stop reason: SDUPTHER

## 2019-04-04 RX ORDER — QUETIAPINE FUMARATE 100 MG/1
100 TABLET, FILM COATED ORAL NIGHTLY
Qty: 90 TABLET | Refills: 3 | Status: SHIPPED | OUTPATIENT
Start: 2019-04-04 | End: 2020-04-24 | Stop reason: SDUPTHER

## 2019-04-04 NOTE — TELEPHONE ENCOUNTER
----- Message from Chantel Russ RN sent at 4/4/2019 10:52 AM CDT -----  Contact: ZABRINA Jean this is Chantel with [facility]. [facility]. Pt scored a 14 on the PHQ-9. Pt reports that he has no thoughts of harming himself. Pt reports that he is out of the following medications Flonase and Flomax. Med rec completed with pt via phone, at this time duplication of meds were read by the pt and brought to his attention. Pt reports that he has frequent falls. Pt reports that he is not receiving PT services at this time through home health services.    Please advise  Thank you

## 2019-04-04 NOTE — PROGRESS NOTES
Summary:Pt reports that he gives permission to speak with his brother Kodi. Pt reports that he lives in an apartment. Pt reports that he does not have a caregiver to assist with his care. Pt reports that he has some difficulties with ADL's. Pt reports that he is intersted in getting Meals on Wheels. Pt reports that his appetite is poor at this time. Pt reports that he falls frequently. Pt reports that he becomes dizzy at times and falls. Pt reports that he does not have transportation to Rehab. Pt reports that he is receiving home health services. Pt reports that he is not receiving PT services through home health. Pt reports that his incision site looks good at this time. PHQ-9 completed with pt with a score of 14. Pt reports that he feels like a disappointment to his children. Pt reports that he has seven children. Pt reports that he does not have thoughts of harming himself. Pt reports that he is a . Pt reports that he was in and out of [place].Pt reports that [facility], but receives his care from [facility]. Med rec completed with pt.duplication of meds noted. Pt reports that he is out of the Flomax and Flonase. Pt able to verbalize of use of some of his medications. Pt reports that he is compliant with taking his medications. Pt reports that his meds are delivered from the pharmacy. Pt reports that he has an evacuation plan with his brother. Pt reports that he does not smoke cigarettes everyday. Pt reports that he is making an effort to stop smoking cigarettes. Pt reports that he does not sleep at night. Pt reports that he takes sleep medications which do not work. Pt reports that he uses a cane to help with getting from sitting to standing position. Pt reports that the apartment does not have grab bars on the bathroom walls.               Interventions:PHQ-9 completed with pt.                        Send inbasket message to PCP  with PHQ-9 results.                        Refer LCSW to the case.                        Med rec completed with pt.                        Mail KATIE material.     Plan:Review risk factors of CAD    Todays [facility] Self-Management Care Plan was developed with the patients/caregivers input and was based on identified barriers from todays assessment.  Goals were written today with the patient/caregiver and the patient has agreed to work towards these goals to improve his/her overall well-being. Patient verbalized understanding of the care plan, goals, and all of today's instructions. Encouraged patient/caregiver to communicate with his/her physician and health care team about health conditions and the treatment plan.  Provided my contact information today and encouraged patient/caregiver to call me with any questions as needed.

## 2019-04-04 NOTE — TELEPHONE ENCOUNTER
Spoke to patient. States he has been taking the Wellbutrin. He says he needs a refill on it and the Seroquel. Orders pended.

## 2019-04-04 NOTE — LETTER
April 5, 2019    Otis Rodriguez  [address]  [place] [address]             [facility]  [address]  [facility] [address] Dear MrRocío Rodriguez,    Thank you for talking with me on Thursday April 4, 2019. You are enrolled in [facility].  My name is Chantel and I am the  on your care team at [facility]. I will provide you with additional information about your diseases, medications, ant treatments. I have enclosed some information for you to read pertaining to your health.      If you have any questions or concerns please call the Office of [facility] Management at [phone]. If you choose to contact me directly my phone number is [phone].     Thank you,      Chantel Russ, RN BSN  [facility]

## 2019-04-04 NOTE — TELEPHONE ENCOUNTER
Attempt to contact patient. No answer. Left message notifying of medication refills available at pharmacy.

## 2019-04-05 NOTE — PATIENT INSTRUCTIONS
Controlling High Blood Pressure  High blood pressure (hypertension) is often called the silent killer. This is because many people who have it dont know it. High blood pressure is defined as 140/90 mm Hg or higher. Know your blood pressure and remember to check it regularly. Doing so can save your life. Here are some things you can do to help control your blood pressure.    Choose heart-healthy foods  · Select low-salt, low-fat foods. Limit sodium intake to 2,400 mg per day or the amount suggested by your healthcare provider.  · Limit canned, dried, cured, packaged, and fast foods. These can contain a lot of salt.  · Eat 8 to 10 servings of fruits and vegetables every day.  · Choose lean meats, fish, or chicken.  · Eat whole-grain pasta, brown rice, and beans.  · Eat 2 to 3 servings of low-fat or fat-free dairy products.  · Ask your doctor about the DASH eating plan. This plan helps reduce blood pressure.  · When you go to a restaurant, ask that your meal be prepared with no added salt.  Maintain a healthy weight  · Ask your healthcare provider how many calories to eat a day. Then stick to that number.  · Ask your healthcare provider what weight range is healthiest for you. If you are overweight, a weight loss of only 3% to 5% of your body weight can help lower blood pressure. Generally, a good weight loss goal is to lose 10% of your body weight in a year.  · Limit snacks and sweets.  · Get regular exercise.  Get up and get active  · Choose activities you enjoy. Find ones you can do with friends or family. This includes bicycling, dancing, walking, and jogging.  · Park farther away from building entrances.  · Use stairs instead of the elevator.  · When you can, walk or bike instead of driving.  · [place] leaves, garden, or do household repairs.  · Be active at a moderate to vigorous level of physical activity for at least 40 minutes for a minimum of 3 to 4 days a week.   Manage stress  · Make time to relax and enjoy  life. Find time to laugh.  · Communicate your concerns with your loved ones and your healthcare provider.  · Visit with family and friends, and keep up with hobbies.  Limit alcohol and quit smoking  · Men should have no more than 2 drinks per day.  · Women should have no more than 1 drink per day.  · Talk with your healthcare provider about quitting smoking. Smoking significantly increases your risk for heart disease and stroke. Ask your healthcare provider about community smoking cessation programs and other options.  Medicines  If lifestyle changes arent enough, your healthcare provider may prescribe high blood pressure medicine. Take all medicines as prescribed. If you have any questions about your medicines, ask your healthcare provider before stopping or changing them.   Date Last Reviewed: 4/27/2016 © 2000-2017 [employer]. [address] [place] [address], [place] [address]. All rights reserved. This information is not intended as a substitute for professional medical care. Always follow your healthcare professional's instructions.        High Cholesterol  High cholesterol is also called hypercholesterolemia. Cholesterol and dietary fat are not the same thing. But, its important to understand how the fat in your diet affects your cholesterol level.  Your body needs cholesterol to build new cells and make certain hormones. There are 2 kinds of cholesterol in your body:  · HDL (good) cholesterol stops fatty deposits (plaque) from building up in your arteries. In this way it protects you against heart disease and stroke.  · LDL (bad) cholesterol stays in your body and sticks to artery walls. It may later block blood flow to your heart and brain. This can cause a heart attack (acute myocardial infarction) or stroke.  Your body makes all the cholesterol it needs. But you also get cholesterol from many of the foods you eat. This is why you want to limit how much cholesterol you get in your diet  and how  much fat you eat. Thats because the cholesterol your body makes from the fat you eat creates the most risk for disease. The type of fat you eat has the biggest influence on how much cholesterol your body makes.   Fats come in 2 kinds:  · Good fats are the unsaturated fats. These are also called monounsaturated and polyunsaturated fats. They raise the level of good cholesterol and lower the level of bad cholesterol. Good fats are found in vegetable oils like olive, sunflower, corn, and soybean oils, and in nuts and seeds.  · Bad fats are saturated fats and trans fats. These raise the risk for disease. They lower the good cholesterol and raise the level of bad cholesterol. Bad fats are found in all red meat and whole-milk dairy products. Some plants also have a lot of saturated fats such as coconut and palm plants. Trans fats are found in stick margarines and many fast foods and commercially baked goods. Soft margarine sold in tubs has less trans fat and is safer to use. Trans fat in particular raise bad cholesterol and lowers good cholesterol.  You can have high blood cholesterol if you eat a diet high in saturated fat and dont get much exercise. In some cases, your family history plays a role. Your health care provider can diagnose high cholesterol with blood tests. Treatment consists of a diet low in saturated fat, weight loss, and exercise. If these efforts dont lower your cholesterol, your provider may prescribe medicines. They must be taken daily to keep your cholesterol levels low. Being overweight also raises the risk for high cholesterol and heart disease. Losing even a small amount of weight can help lower your risk.  High risk groups  Certain groups of people should talk to their healthcare provider about using cholesterol-lowering statin medicines for controlling their cholesterol to stay healthy or to prevent future heart attacks or stroke. It may be beneficial to take a medicine in addition to eating  a healthy diet and exercising regularly for these groups. The major groups include:  · Adults who have had a heart attack or stroke or some other atherosclerotic disease (such as peripheral vascular disease), a transient ischemic attack, stable or unstable angina, and anyone who has had a procedure to restore blood flow through a blocked artery such as percutaneous coronary intervention, angioplasty, stent, open-heart bypass surgery.  · Adults who have diabetes or an elevated calculated risk of having a heart attack or stroke (7.5%) and an elevated level of LDL cholesterol  mg/dL.  · People who are 21 years of age and older who have an elevated LDL cholesterol level of 190 mg/dL or higher  Home care  Follow these guidelines when caring for yourself at home:  · Talk with your health care provider before starting a low-cholesterol diet or weight-loss program.  · In general, a low-cholesterol diet means that you eat less saturated fat (red meat and regular dairy) and less cholesterol each day. You may eat foods with unsaturated fats (vegetable oils, nuts, and seeds). Eat more fruits, vegetables, fish, and whole grains, or other high-fiber foods.  · Learn to read food labels so you know what you are eating.  · A registered dietitian can teach you how to plan meals and change your diet. You can ask your provider for a referral.  · Aim for 40 minutes of moderate to vigorous physical activity 3 to 4 times a week. Pick activities you enjoy. Walking is a good choice if you want to lose weight. If you have diabetes, hypertension, or heart disease, talk with your provider to see what activities he or she recommends.  · If your provider has prescribed medicines, take them as directed.  · If you smoke, talk with your provider about how to quit smoking. Smoking lowers good cholesterol levels and can increase damage done by bad cholesterol.  · Limit how much alcohol you drink.  · If you have diabetes, talk with your provider  and a dietitian about other food and lifestyle changes you can make to lower your risk for heart disease and stroke.  Follow-up care  Follow up with your healthcare provider, or as advised. It takes at least 3 months for dietary changes to show a result in your blood cholesterol. Have repeat blood testing as advised by your provider.  If an X-ray or ECG (electrocardiogram) was done, a specialist will look at it. You will be told of any new findings that may affect your care.  When to seek medical advice  Call your healthcare provider right away if any of these occur:  · Chest, arm, shoulder, neck, or upper back pain  · Shortness of breath  · Weakness or numbness of an arm, leg, or one side of the face  · Trouble with speech or vision  · Weakness, dizziness, or fainting  Talk to your healthcare provider about your treatment goals. Make sure you understand how cholesterol impacts you based on your personal health history and family history of heart disease or high cholesterol. Plan to have regular monitoring and follow up on any side effects that you may develop to the cholesterol-lowering medicines. Be aware that sometimes you may need more than one medicine to reach your cholesterol goals. Also make sure you understand how to prepare for your cholesterol testing which may or may not require fasting.  Date Last Reviewed: 1/1/2017  © 8139-9352 [employer]. [address] [place] [address], [place] [address]. All rights reserved. This information is not intended as a substitute for professional medical care. Always follow your healthcare professional's instructions.        Fall Prevention  Falls often occur due to slipping, tripping or losing your balance. Millions of people fall every year and injure themselves. Here are ways to reduce your risk of falling again.  · Think about your fall, was there anything that caused your fall that can be fixed, removed, or replaced?  · Make your home safe by keeping walkways  clear of objects you may trip over.  · Use non-slip pads under rugs. Do not use area rugs or small throw rugs.  · Use non-slip mats in bathtubs and showers.  · Install handrails and lights on staircases.  · Do not walk in poorly lit areas.  · Do not stand on chairs or wobbly ladders.  · Use caution when reaching overhead or looking upward. This position can cause a loss of balance.  · Be sure your shoes fit properly, have non-slip bottoms and are in good condition.   · Wear shoes both inside and out. Avoid going barefoot or wearing slippers.  · Be cautious when going up and down stairs, curbs, and when walking on uneven sidewalks.  · If your balance is poor, consider using a cane or walker.  · If your fall was related to alcohol use, stop or limit alcohol intake.   · If your fall was related to use of sleeping medicines, talk to your doctor about this. You may need to reduce your dosage at bedtime if you awaken during the night to go to the bathroom.    · To reduce the need for nighttime bathroom trips:  ¨ Avoid drinking fluids for several hours before going to bed  ¨ Empty your bladder before going to bed  ¨ Men can keep a urinal at the bedside  · Stay as active as you can. Balance, flexibility, strength, and endurance all come from exercise. They all play a role in preventing falls. Ask your healthcare provider which types of activity are right for you.  · Get your vision checked on a regular basis.  · If you have pets, know where they are before you stand up or walk so you don't trip over them.  · Use night lights.  Date Last Reviewed: 11/5/2015  © 3788-8140 [employer]. [address] [place] [address], [place] [address]. All rights reserved. This information is not intended as a substitute for professional medical care. Always follow your healthcare professional's instructions.        Preventing Falls: Make Your Health a Priority    Having a health problem can make you more likely to fall. Taking certain  kinds of medicines may also increase your risk of falls. So, improving your health can help you avoid a fall. Work with your healthcare provider to manage health problems and to review your medicines. If you have your health under control, your risk of falling is lessened.  How chronic conditions increase your fall risk  Health problems like diabetes, high or low blood pressure, and arthritis are called chronic health conditions. They can be managed, but they don't go away. Chronic health problems put you at greater risk of a fall. This is because they can affect many parts of your body. They may cause problems with movement, balance, or vision. And certain medicines you take for them may have side effects, such as dizziness or drowsiness. These side effects also increase your risk.  Work with your healthcare provider  Your healthcare provider can work with you to help prevent a fall. See your healthcare provider for a medical exam each year. Go more often if you have symptoms, such as leg numbness or dizziness, that could raise your risk of falling. If you have a history of falls, let your provider know. Bring a list of your medicines to review with your healthcare provider. Discuss your nutrition and exercise routine. And ask whether you need any tests to assess your risk of falling.  Review your medicines  Medicines (even ones you buy over the counter) can cause side effects that lead to a fall. Common medicines that cause these kinds of side effects are blood pressure, heart, or pain medicines, medicines for sleep, and antidepressants. Store pain medicine in a secure area, such as an upper cabinet in your kitchen or bathroom. Don't mix different pills in the same bottle. Always store and travel with medicines in their original containers with clear labels. This will reduce the chance of taking the wrong medicine or too much of a medicine. Taking too much of a medicine or taking the wrong medicine may cause side  effects that can increase your risk of falling.  Also, the way your body reacts to medicines can change as you age. So, certain medicines that were fine in the past may cause side effects now. Your healthcare provider (such as your doctor or pharmacist) can help review your medicines and make changes if needed.  Get your eyes and ears checked  Problems with vision or hearing can lead to falls:  · Get your eyes checked at least once a year. Take time to adjust to new glasses.  · Get your hearing checked at least every other year.  · Have your doctor check your inner ear for problems that may affect your balance.  Get the right nutrition  If you don't get enough to eat or drink, you can become dizzy and fall:  · Your sense of thirst decreases with age. Drink water throughout the day.  · Eat breakfast. Plan regular meals.  · Ask your healthcare provider whether you need supplements. These can help strengthen your bones and muscles to help prevent falls. They can also help prevent fractures if you do fall.  Stay as active as you can  Staying active is one of the best things you can do to prevent falls. Keep in mind that doing too little can be as risky as doing too much. That's because not being active can make you weaker and more likely to fall. Balance, flexibility, strength, and endurance all come from exercise. They all play a role in preventing falls. Ask your healthcare provider which types of activity are right for you.  When to call your healthcare provider  Be sure to call your healthcare provider if you fall. Also call if you have any of these signs or symptoms (someone else may need to point them out to you):  · Feeling lightheaded or dizzy more than once a day  · Losing your balance often or feeling unsteady on your feet  · Feeling numbness in your legs or feet, or noticing a change in the way you walk  · Having a steady decline in your memory or mental sharpness   Date Last Reviewed: 6/13/2015  © 5355-6224  [employer]. [address] [place] [address], [place] [address]. All rights reserved. This information is not intended as a substitute for professional medical care. Always follow your healthcare professional's instructions.        Preventing Falls in the Home  An adult or child can fall for many reasons. If you are an older adult, you may fall because your reaction time slows down. Your muscles and joints may get stiff, weak, or less flexible because of illness, medicines, or a physical condition. These things can also make a child more likely to fall or be injured in a fall.  Other health problems that make falls more likely include:  · Arthritis  · Dizziness or lightheadedness when you get out of bed (orthostatic hypotension)  · History of a stroke  · Dizziness  · Anemia  · Certain medicines taken for mental illness  · Problems with balance or gait  · History of falls with or without an injury  · Changes in vision (vision impairment)  · Changes in thinking skills and memory (cognitive impairment)  Injuries from a fall can include broken bones, dislocated joints, and cuts. When these injuries are serious enough, they can make it impossible for you or a child who is injured in a fall to live on his or her own.  Prevention tips  To help prevent falls and fall-related injuries, follow the tips below.   Floors  Make floors safer by doing the following:   · Put nonskid pads under area rugs.  · Remove throw rugs.  · Replace worn floor coverings.  · Tack carpets firmly to each step on carpeted stairs. Put nonskid strips on the edges of uncarpeted stairs.  · Keep floors and stairs free of clutter and cords.  · Arrange furniture so there are clear pathways.  · Clean up any spills right away.  · Wear shoes that fit.  Bathrooms    Make bathrooms safer by doing the following:   · Install grab bars in the tub or shower.  · Apply nonskid strips or put a nonskid rubber mat in the tub or shower.  · Sit on a bath chair to  bathe.  · Use bathmats with nonskid backing.  Lighting and the environment  Improve lighting in your home by doing the following:   · Keep a flashlight in each room. Or put a lamp next to the bed within easy reach.  · Put nightlights in the bedrooms, hallways, kitchen, and bathrooms.  · Make sure all stairways have good lighting.  · Take your time when going up and down stairs.  · Put handrails on both sides of stairs and in walkways for more support. To prevent injury to your wrist or arm, dont use handrails to pull yourself up.  · Install grab bars to pull yourself up.  · Move or rearrange items that you use often. This will make them easier to find or reach.  · Look at your home to find any safety hazards. Especially look at doorways, walkways, and the driveway. Remove or repair any safety problems that you find.  Date Last Reviewed: 8/1/2016 © 2000-2017 [employer]. [address] [place] [address]. All rights reserved. This information is not intended as a substitute for professional medical care. Always follow your healthcare professional's instructions.        After Coronary Artery Bypass Surgery  Your healthcare provider performed coronary artery bypass graft surgery (also called CABG, pronounced cabbage). This surgery created new pathways around blocked parts of your hearts blood vessels, allowing blood to reach your heart muscle. Your healthcare provider used a healthy blood vessel from another part of your body (a graft) to restore blood flow.  Activity  · Discuss with your healthcare doctor what you can and cant do as you recover. You will have good and bad days. This is normal. But tell your healthcare provider if you feel depressed, have trouble sleeping, or have a persistent decrease in appetite. Although these problems are common after surgery, they can slow your recovery. Its important to seek help.  · Let others drive you wherever you need to go for the first 3 to  6 weeks after your surgery.  · Ask someone to stand nearby while you shower or do other activities, just in case you need help.  · Avoid using very hot water while showering. It can affect your circulation and make you dizzy.  · Weigh yourself every day, at the same time of day, and in the same kind of clothes. Quick weight gain can be a sign of a problem that needs your healthcare providers attention.  · You may start doing light work around the house and yard after 2 to 3 weeks at home. Dont lift anything heavier than 5 pounds. Your healthcare provider may give you a more specific weight restriction. Until approved by your healthcare provider, avoid mowing the lawn, vacuuming, driving, and doing other activities that could strain your breastbone.  · Ask your healthcare provider when you can expect to return to work.  Pain relief  You will recover faster after surgery if your pain is kept under control:  · Dont be surprised if you feel sharp pains as your breastbone heals or if you have soreness in your incision during changes in weather.  · Tell your healthcare provider if you have questions about what youre feeling, if your medicines dont reduce your pain, or if you suddenly feel worse.  Incision care  Healing takes several weeks. The bandage or dressing on your chest will likely be removed before you go home. If it is still in place, ask your healthcare provider how you should care for it after you return home. Do the following to care for your incision:  · If there are any steri-strips still on your incision, you can remove them after a week if they haven't already fallen off.  · Clean your incision every day with soap and water.  · Gently pat the area of the incision to dry it.  · Dont use any powders, lotions, or oils on your incision until it is well healed.  Lifestyle changes  · Ask your healthcare provider when you can start a walking program:  ¨ If you havent already started a walking program in  the hospital, begin with short walks (about 5 minutes) at home. Go a little longer each day.  ¨ Choose a safe place with a level surface, such as a local park or mall.  ¨ Wear supportive shoes to prevent injury to your knees and ankles.  ¨ Walk with someone. Its more fun and helps you stay with it.  · Take your medicines exactly as directed. Dont skip doses.  · Maintain a healthy weight. Get help to lose any extra pounds.  · Avoid fatty and fried foods. Stick to lean meats, such as chicken or fish.   · Cut back on salt:  ¨ Limit canned, dried, packaged, and fast foods.  ¨ Dont add salt to your food at the table.  ¨ Season foods with herbs instead of salt when you cook.  · Break the smoking habit. Enroll in a stop-smoking program to improve your chances of success.  When to call your healthcare provider  Call your healthcare provider immediately if you have any of the following:  · Chest pain or a return of the heart symptoms you had before your surgery  · Fever of 100.4°F (38°C) or higher, or as directed by your healthcare provider  · Signs of infection (redness, swelling, drainage, or warmth) at the incision site  · Shortness of breath  · Fainting  · Weight gain of more than 3 pounds in 1 day, more than 5 pounds in 1 week, or whatever weight gain you were told to report by your healthcare provider  · New or increased swelling in your hands, feet, or ankles  · Unrelieved pain at the incision site(s)  · Changes in the location, type, or severity of pain  · Fast or irregular pulse  · Persistent abdominal pain  · Nausea  · Trouble urinating  · Any unusual bleeding   Date Last Reviewed: 10/1/2016  © 6837-7468 [employer]. [address] [place] [address], [place] [address]. All rights reserved. This information is not intended as a substitute for professional medical care. Always follow your healthcare professional's instructions.        Your High Blood Pressure Risk Factors  Risk factors are things that make you  more likely to have a disease or condition. Do you know your risk factors for high blood pressure? You cant do anything about some risk factors. But other risk factors are things that can be changed. Know what high blood pressure risk factors you have. Then find out what changes you can make to help control your risk for high blood pressure. Start with the change that you think will be easiest for you.  Risk factors you cant control  Though you cant change any of the things listed below, check off the ones that apply to you. The more boxes you check, the greater your risk for high blood pressure.  Family history  ? One or both of your parents or grandparents has had high blood pressure or heart disease.  Gender and age  ? Youre a man over age 55 or a postmenopausal woman.  Risk factors you can control  There are plenty of risk factors for high blood pressure that you can control. Learn what these risk factors are and then find out how to reduce your risk. Check the ones that apply to you.  ? What you eat  Do you eat a lot of salty, fatty, fried, or greasy foods? Do you go to restaurants or eat out frequently?  ? Alcohol consumption  Do you drink more than 1 drink a day if you are a female or more than 2 drinks a day if you male?   ? If you smoke or someone close to you smokes  Do you smoke cigarettes or cigars, chew tobacco, or dip snuff? Are you exposed to second-hand smoke on a regular basis?  ? How active you are   Are you inactive most of the time at work and at home? Do you go weeks without exercising?  ? Your weight   Has your doctor said that you are 15 or more pounds overweight?  ? Your stress level    Do you often feel anxious, nervous, and stressed? Do you feel that you don't have a supportive environment?  Date Last Reviewed: 4/27/2016  © 1345-3163 [employer]. [address] [place] [address], [place] [address]. All rights reserved. This information is not intended as a substitute for  professional medical care. Always follow your healthcare professional's instructions.        Low-Salt Choices  Eating salt (sodium) can make your body retain too much water. Excess water makes your heart work harder. Canned, packaged, and frozen foods are easy to prepare, but they are often high in sodium. Here are some ideas for low-salt foods you can easily prepare yourself.    For breakfast  · Fruit or 100% fruit juice  · Whole-wheat bread or an English muffin. Compare sodium content on labels.  · Low-fat milk or yogurt  · Unsalted eggs  · Shredded wheat  · Corn tortillas  · Unsalted steamed rice  · Regular (not instant) hot cereal, made without salt  Stay away from:  · Sausage, brumfield, and ham  · Flour tortillas  · Packaged muffins, pancakes, and biscuits  · Instant hot cereals  · Cottage cheese  For lunch and dinner  · Fresh fish, chicken, turkey, or meat--baked, broiled, or roasted without salt  · Dry beans, cooked without salt  · Tofu, stir-fried without salt  · Unsalted fresh fruit and vegetables, or frozen or canned fruit and vegetables with no added salt  Stay away from:  · Lunch or deli meat that is cured or smoked  · Cheese  · Tomato juice and catsup  · Canned vegetables, soups, and fish not labeled as no-salt-added or reduced sodium  · Packaged gravies and sauces  · Olives, pickles, and relish  · Bottled salad dressings  For snacks and desserts  · Yogurt  · Unsalted, air popped popcorn  · Unsalted nuts or seeds  Stay away from:  · Pies and cakes  · Packaged dessert mixes  · Pizza  · Canned and packaged puddings  · Pretzels, chips, crackers, and nuts--unless the label says unsalted  Date Last Reviewed: 6/17/2015  © 1571-8332 [employer]. [address] [place] [address], [place] [address]. All rights reserved. This information is not intended as a substitute for professional medical care. Always follow your healthcare professional's instructions.        Low-Salt Diet  This diet removes foods that are  high in salt. It also limits the amount of salt you use when cooking. It is most often used for people with high blood pressure, edema (fluid retention), and kidney, liver, or heart disease.  Table salt contains the mineral sodium. Your body needs sodium to work normally. But too much sodium can make your health problems worse. Your healthcare provider is recommending a low-salt (also called low-sodium) diet for you. Your total daily allowance of salt is 1,500 to 2,300 milligrams (mg). It is less than 1 teaspoon of table salt. This means you can have only about 500 to 700 mg of sodium at each meal. People with certain health problems should limit salt intake to the lower end of the recommended range.    When you cook, dont add much salt. If you can cook without using salt, even better. Dont add salt to your food at the table.  When shopping, read food labels. Salt is often called sodium on the label. Choose foods that are salt-free, low salt, or very low salt. Note that foods with reduced salt may not lower your salt intake enough.    Beans, potatoes, and pasta  Ok: Dry beans, split peas, lentils, potatoes, rice, macaroni, pasta, spaghetti without added salt  Avoid: Potato chips, tortilla chips, and similar products  Breads and cereals  Ok: Low-sodium breads, rolls, cereals, and cakes; low-salt crackers, matzo crackers  Avoid: Salted crackers, pretzels, popcorn, [place] toast, pancakes, muffins  Dairy  Ok: Milk, chocolate milk, hot chocolate mix, low-salt cheeses, and yogurt  Avoid: Processed cheese and cheese spreads; Roquefort, Camembert, and cottage cheese; buttermilk, instant breakfast drink  Desserts  Ok: Ice cream, frozen yogurt, juice bars, gelatin, cookies and pies, sugar, honey, jelly, hard candy  Avoid: Most pies, cakes and cookies prepared or processed with salt; instant pudding  Drinks  Ok: Tea, coffee, fizzy (carbonated) drinks, juices  Avoid: Flavored coffees, electrolyte replacement drinks, sports  drinks  Meats  Ok: All fresh meat, fish, poultry, low-salt tuna, eggs, egg substitute  Avoid: Smoked, pickled, brine-cured, or salted meats and fish. This includes brumfield, chipped beef, corned beef, hot dogs, deli meats, ham, kosher meats, salt pork, sausage, canned tuna, salted codfish, smoked salmon, herring, sardines, or anchovies.  Seasonings and spices  Ok: Most seasonings are okay. Good substitutes for salt include: fresh herb blends, hot sauce, lemon, garlic, camara, vinegar, dry mustard, parsley, cilantro, horseradish, tomato paste, regular margarine, mayonnaise, unsalted butter, cream cheese, vegetable oil, cream, low-salt salad dressing and gravy.  Avoid: Regular ketchup, relishes, pickles, soy sauce, teriyaki sauce, Worcestershire sauce, BBQ sauce, tartar sauce, meat tenderizer, chili sauce, regular gravy, regular salad dressing, salted butter  Soups  Ok: Low-salt soups and broths made with allowed foods  Avoid: Bouillon cubes, soups with smoked or salted meats, regular soup and broth  Vegetables  Ok: Most vegetables are okay; also low-salt tomato and vegetable juices  Avoid: Sauerkraut and other brine-soaked vegetables; pickles and other pickled vegetables; tomato juice, olives  Date Last Reviewed: 8/1/2016 © 2000-2017 [employer]. [address] [place] [address]. All rights reserved. This information is not intended as a substitute for professional medical care. Always follow your healthcare professional's instructions.

## 2019-04-08 ENCOUNTER — TELEPHONE (OUTPATIENT)
Dept: SMOKING CESSATION | Facility: CLINIC | Age: 79
End: 2019-04-08

## 2019-04-08 NOTE — TELEPHONE ENCOUNTER
[facility]- called to check on patient, no show for intake appointment. Left message to call back to reschedule [phone] or  [phone].

## 2019-04-11 ENCOUNTER — OUTPATIENT CASE MANAGEMENT (OUTPATIENT)
Dept: ADMINISTRATIVE | Facility: OTHER | Age: 79
End: 2019-04-11

## 2019-04-11 ENCOUNTER — TELEPHONE (OUTPATIENT)
Dept: FAMILY MEDICINE | Facility: CLINIC | Age: 79
End: 2019-04-11

## 2019-04-11 NOTE — PROGRESS NOTES
Summary: Pt reports that he is doing well. Reports that he has no chest pain or SOB. Pt reports that the HHN has not visited this week. Reports that his appetite is fair.        Interventions:Reviewed with pt the prevention of complications of CAD/ cardiac diet .                         Reviewed some safety measures with pt.     Plan: Continue on prevention of prevention oF complications of CAD.

## 2019-04-11 NOTE — TELEPHONE ENCOUNTER
----- Message from Nai Kwon sent at 4/11/2019  1:08 PM CDT -----  Contact: [facility]- [place]   Type: RX Refill Request    Who Called: Jami - [facility]    Refill or New Rx: refill     RX Name and Strength: QUEtiapine (SEROQUEL) 50 MG Tab    How is the patient currently taking it? (ex. 1XDay):    Is this a 30 day or 90 day RX: 90    [place] [facility] [place] [address] [phone] (Phone)  [phone] (Fax)      Local or Mail Order:Local     Ordering Provider: Brayan     Would the patient rather a call back or a response via [facility]? [facility]     Best Call Back Number: [phone]    Additional Information:

## 2019-04-16 ENCOUNTER — OUTPATIENT CASE MANAGEMENT (OUTPATIENT)
Dept: ADMINISTRATIVE | Facility: OTHER | Age: 79
End: 2019-04-16

## 2019-04-16 NOTE — PROGRESS NOTES
Summary:  1st Attempt to complete Social Work Assessment for Outpatient Care Management; left message requesting a return call.  LCSW will reattempt at a later date.      Interventions:  None    Plan:  Make 2nd attempt later this week.

## 2019-04-18 ENCOUNTER — OUTPATIENT CASE MANAGEMENT (OUTPATIENT)
Dept: ADMINISTRATIVE | Facility: OTHER | Age: 79
End: 2019-04-18

## 2019-04-18 NOTE — PROGRESS NOTES
Summary:  2nd attempt to complete Social Work Assessment for OPCM; in the attempt to explain program and begin SW assessment, pt began to discuss transportation concerns and later hung up on LCSW. LCSW will mail a letter with contact information requesting a return call.  OPCM RN notified.      Interventions:  Collaborated with OPCM-RN  Mailed unable to reach letter    Plan:  Make 3rd attempt on next week.

## 2019-04-18 NOTE — LETTER
April 18, 2019    Otis Rodriguez  [address]  [place] [address]             [facility]  [address]  [place] [address] Dear Mr. Rodriguez:    I am writing from the Outpatient Complex Care Management Department at [facility].  I received a referral from Chantel Russ,ZABRINA to contact you regarding any needs you may have.  I have been unable to reach you by phone. Please contact me if you would like to discuss any needs you may have.    I can be reached at [phone] Monday thru Friday from 8:00am to 4:30pm.  [facility] also has a program with a nurse available 24/7 to answer questions or provide medical advice.  [facility] can be reached at [phone].    Sincerely,         Jes Bean LCSW

## 2019-04-26 ENCOUNTER — OUTPATIENT CASE MANAGEMENT (OUTPATIENT)
Dept: ADMINISTRATIVE | Facility: OTHER | Age: 79
End: 2019-04-26

## 2019-04-26 NOTE — PROGRESS NOTES
Summary  3rd Attempt to complete SW Assessment for Outpatient Care Management; pt stated now is not the best time to talk as he is feeling under the weather.  LCSW will make final attempt on Monday    Interventions:  Collaborated with OPCM-RN    Plan:  Make final attempt on Monday

## 2019-04-29 NOTE — PROGRESS NOTES
Summary:  LCSW contacted pt for follow up. Pt stated he is doing fair. Pt stated he was walking down the stairs to retrieve his mail. A partial assessment was completed; however, the call was ended, as if the phone ; LCSW made attempts to return the call but the call is doing to voicemail.  Pt is not interested in Advance Directives. With regards to natural disasters, pt stated he will travel with 1 of his 7 children. Pt stated he is having difficulty obtaining his medication and cannot pick them up from [employer] until this Wednesday. Pt continued to reference having 17 medications prescribed since his hospitalization in Dec. 2018. Pt stated he is supposed to attend Cardiac Rehab at [facility]; however, insurance-transportation is not always reliable. Pt denied other needs to be addressed. Pt's PHQ-9 score was 9; however, he declined mental health resources. LCSW will make an attempt to follow up with patient on tomorrow.     Interventions:  Partial Assessment    Plan:  Make attempt on tomorrow       H Plasty Text: Given the location of the defect, shape of the defect and the proximity to free margins a H-plasty was deemed most appropriate for repair.  Using a sterile surgical marker, the appropriate advancement arms of the H-plasty were drawn incorporating the defect and placing the expected incisions within the relaxed skin tension lines where possible. The area thus outlined was incised deep to adipose tissue with a #15 scalpel blade. The skin margins were undermined to an appropriate distance in all directions utilizing iris scissors.  The opposing advancement arms were then advanced into place in opposite direction and anchored with interrupted buried subcutaneous sutures.

## 2019-04-30 NOTE — PROGRESS NOTES
Summary:  LCSW attempted to return the call back to patient to complete assessment; pt stated he was trying to fall back asleep.     LCSW has not had a returned call from patient; pt's case will be closed out due to multiple missed attempts. OPCM-RN informed    Interventions:  Collaborated with OPCM-RN  Case closure    Plan:

## 2019-05-02 ENCOUNTER — OUTPATIENT CASE MANAGEMENT (OUTPATIENT)
Dept: ADMINISTRATIVE | Facility: OTHER | Age: 79
End: 2019-05-02

## 2019-05-02 NOTE — PROGRESS NOTES
Summary:Calls placed to [phone]. Pt answers the calls and then does not say anything else.      Interventions:Will schedule a callback.    Plan:na

## 2019-05-07 ENCOUNTER — OUTPATIENT CASE MANAGEMENT (OUTPATIENT)
Dept: ADMINISTRATIVE | Facility: OTHER | Age: 79
End: 2019-05-07

## 2019-05-07 NOTE — PROGRESS NOTES
"Summary: Pt reports that he was not feeling well yesterday and went to the hospital. Pt reports that he went to [facility]. Pt reports that  is the only hospital the he goes to for emergency care. Pt reports that he had complaint of chest pain and difficulty with breathing. Pt reports that he was given a shot and some medicines. Pt reports that he was not told that he had "fluid on him". Pt reports that he is unable to recall the diagnosis that he was treated for in the emergency room. Pt reports that he fells better today.       Interventions:Reviewed with pt the prevention of complications of CAD.                        Change to episodic    Plan: Continue with the review of prevention of complications of CAD.            Review the risk factors of HTN    "

## 2019-05-22 ENCOUNTER — OUTPATIENT CASE MANAGEMENT (OUTPATIENT)
Dept: ADMINISTRATIVE | Facility: OTHER | Age: 79
End: 2019-05-22

## 2019-05-22 NOTE — PROGRESS NOTES
Summary:Call placed to [phone] with no answer. Unable to leave a message.      Interventions:Schedule a call back.      Plan:na

## 2019-05-28 ENCOUNTER — OUTPATIENT CASE MANAGEMENT (OUTPATIENT)
Dept: ADMINISTRATIVE | Facility: OTHER | Age: 79
End: 2019-05-28

## 2019-05-28 NOTE — PROGRESS NOTES
Summary:Call placed to [phone], unable to leave a message.      Interventions: Schedule a callback.    Plan:na

## 2019-06-05 ENCOUNTER — OUTPATIENT CASE MANAGEMENT (OUTPATIENT)
Dept: ADMINISTRATIVE | Facility: OTHER | Age: 79
End: 2019-06-05

## 2019-06-05 NOTE — PROGRESS NOTES
Summary: Pt reports that he is checking his blood pressure everyday. Pt reports that his blood pressure is running around 114/58. Pt reports that he is eating foods with low salt and not fried. Pt reports that he has been free of chest pain recently. Pt reports that he has a small part of a tooth that is broken in the gum. Pt reports that he does not have any drainage from the area. Pt reports that he will call a dentist today. Pt reports that he does not have any teeth. Pt reports that he is taking his medication as prescribed.    Interventions: Reviewed the s/s and risk factors of HTN    Plan:Review prevention of complications of HTN    Todays OPCM Self-Management Care Plan was developed with the patients/caregivers input and was based on identified barriers from todays assessment.  Goals were written today with the patient/caregiver and the patient has agreed to work towards these goals to improve his/her overall well-being. Patient verbalized understanding of the care plan, goals, and all of today's instructions. Encouraged patient/caregiver to communicate with his/her physician and health care team about health conditions and the treatment plan.  Provided my contact information today and encouraged patient/caregiver to call me with any questions as needed.

## 2019-06-06 ENCOUNTER — TELEPHONE (OUTPATIENT)
Dept: FAMILY MEDICINE | Facility: CLINIC | Age: 79
End: 2019-06-06

## 2019-06-06 DIAGNOSIS — R29.6 FREQUENT FALLS: Primary | ICD-10-CM

## 2019-06-06 NOTE — TELEPHONE ENCOUNTER
Pt requesting order for hovaround electric wheelchair, please advise if you want face to face with you

## 2019-06-06 NOTE — TELEPHONE ENCOUNTER
----- Message from Meghna Renan sent at 6/6/2019  1:23 PM CDT -----  Contact: pt  Type: Patient Call Back    Who called:pt    What is the request in detail:pt needs to order casanova round. Call [phone]    Can the clinic reply by [facility]?    Would the patient rather a call back or a response via [facility]?call back    Best call back number:  Additional Information

## 2019-06-07 NOTE — TELEPHONE ENCOUNTER
Pt states he has back and leg problems, has been having a lot of falls; is using cane to get around now

## 2019-06-21 ENCOUNTER — OUTPATIENT CASE MANAGEMENT (OUTPATIENT)
Dept: ADMINISTRATIVE | Facility: OTHER | Age: 79
End: 2019-06-21

## 2019-06-21 NOTE — PROGRESS NOTES
Summary:Call placed to [phone] with no answer. Unable to leave a message.      Interventions:Schedule a call back.      Plan:na

## 2019-06-28 ENCOUNTER — OUTPATIENT CASE MANAGEMENT (OUTPATIENT)
Dept: ADMINISTRATIVE | Facility: OTHER | Age: 79
End: 2019-06-28

## 2019-06-28 NOTE — PROGRESS NOTES
Summary: Pt reports that he is checking his blood pressure. Pt reports that his pressure is running 130 to 140. Pt reports that he is following a low salt diet. Pt reports that he does not have any swelling in his legs or SOB. Pt reports that he is having pains in both knees. Pt reports that the pain is relief with soaking in warm water. Pt reports that he does not have any swelling noted to the knees. Pt reports that he does not apply any ointment or rubs to the knees. Pt reports that he is only taking the medications that is prescribed.      Interventions:Message sent to PCP's staff to notify of pt's compliant of pain from britta knees.                        Reviewed with pt the prevention of complications of HTN                        Reviewed safety measures with pt.                          Change to episodic    Plan:Review safety measures with pt           Close case on the next encounter.    Todays OPCM Self-Management Care Plan was developed with the patients/caregivers input and was based on identified barriers from todays assessment.  Goals were written today with the patient/caregiver and the patient has agreed to work towards these goals to improve his/her overall well-being. Patient verbalized understanding of the care plan, goals, and all of today's instructions. Encouraged patient/caregiver to communicate with his/her physician and health care team about health conditions and the treatment plan.  Provided my contact information today and encouraged patient/caregiver to call me with any questions as needed.

## 2019-07-01 ENCOUNTER — TELEPHONE (OUTPATIENT)
Dept: FAMILY MEDICINE | Facility: CLINIC | Age: 79
End: 2019-07-01

## 2019-07-01 NOTE — TELEPHONE ENCOUNTER
----- Message from Helen Heredia LPN sent at 6/28/2019  3:00 PM CDT -----  Contact: Chantel Russ RN      ----- Message -----  From: Chantel Russ RN  Sent: 6/28/2019   2:54 PM  To: Jd [facility] Staff    Hello this is Chantel with [facility]. Pt reports that he is having pains in both knee. Pt reports that he has not noticed any swelling. Pt reports that the pain is relieved some after soaking in warm water. Pt is requesting medication for pain relief.    Please advise    Thank you

## 2019-07-08 ENCOUNTER — OUTPATIENT CASE MANAGEMENT (OUTPATIENT)
Dept: ADMINISTRATIVE | Facility: OTHER | Age: 79
End: 2019-07-08

## 2019-07-08 NOTE — PROGRESS NOTES
Summary:Pt reports that he is currently in the ED at [facility]. Pt reports that he is waiting to have a CAT scan. Pt reports that he is trying to find out what is wrong with him.    Interventions:Will schedule a call back    Plan: F/u with c/o pain to knees    Todays OPCM Self-Management Care Plan was developed with the patients/caregivers input and was based on identified barriers from todays assessment.  Goals were written today with the patient/caregiver and the patient has agreed to work towards these goals to improve his/her overall well-being. Patient verbalized understanding of the care plan, goals, and all of today's instructions. Encouraged patient/caregiver to communicate with his/her physician and health care team about health conditions and the treatment plan.  Provided my contact information today and encouraged patient/caregiver to call me with any questions as needed.

## 2019-07-10 ENCOUNTER — OUTPATIENT CASE MANAGEMENT (OUTPATIENT)
Dept: ADMINISTRATIVE | Facility: OTHER | Age: 79
End: 2019-07-10

## 2019-07-10 NOTE — PROGRESS NOTES
Summary:Pt reports that he is feeling good today. Pt reports that he has no pains today. Pt reports that he just checked his BP with result of 159/61. Pt reports that he took all of his medicines this morning. Pt reports that is laying down at the current time. Pt reports that he has all of his medications and plenty of water. Informed pt and pt agrees for a call back next week. Informed pt of upcoming case closure.     Interventions:Reviewed safety measures with pt.                        Reviewed s/s of HTN with pt.    [x] Called and reviewed disaster plan with patient/caregiver.  Provided emergency phone number for patient's [place].   [] Attempted to reach patient/caregiver to review disaster plan.  Left a voice message with the phone number for patient's [place] Office of Emergency Preparedness.     Reviewed with Patient/Caregiver:  [x] Patient to have a supply of water, non-perishable food items, flashlights and batteries  [] Patient to bring all medications if evacuates:  at least a five day supply preferably in the medication bottles, in case displaced for longer than 5 days  [] Patient to bring any DME needed (walkers, wheelchairs, shower chairs, nebulizer machine, etc.)   [] If Diabetic, patient to bring glucometer and monitoring supplies.   [x] If Hypertension, patient to bring blood pressure cuff  [] If CHF, patient to bring scale  [] If tube feeds, patient to bring tube feedings and feeding supplies.  [] If on oxygen,  patient to bring all oxygen supplies (Cannula, portable tanks, concentrator).  Patient will contact oxygen supply company to find out the nearest location of a supply company near evacuated location. ([employer]: [phone], [facility]: [phone], [facility]:[phone], [employer]: [phone]), [facility] -[phone].  [] If on hemodialysis, patient should already have a plan in place with dialysis center. If patient does not know where to evacuate to in order to be  close to a dialysis center, patient/caregiver to reach out to regular dialysis center for further direction. ([facility] service line: [phone], [facility] therapeutics service line [phone])  [] If receiving treatment at an infusion center, patient/caregiver to contact the infusion center to find out which infusion center they have a contract with where patient plans to evacuate.      Office of Emergency Preparedness Phone number:  [] [facility]: [phone]  [x] [facility]: [phone]  [] [facility]: [phone]  [] [facility]: [phone]  [] [facility]: [phone]  [] [facility]: [phone]  [] [facility]: [phone]  [] [facility]: [phone]  [] [facility]: [phone]  [] [facility]: [phone]  [] [facility]: [phone]  [] [facility]: [phone]  [] [facility]: [phone]    [] [facility]:  [phone]   [] [facility]:  [phone]   [] [facility]:  [phone]   [] [facility]:  [phone]   [] [facility]:  [phone]   [] [facility]: [phone]   [] [facility]:  [phone]   [] [facility]:  [phone]   [] [facility]:  [phone]   [] [facility]:  [phone]   [] [facility]:  [phone]   [] [facility]:  [phone]   [] [facility]:  [phone]   [] [facility]:  [phone]   [] [facility]:  [phone]   [] [facility]: [phone]   [] [facility]:  [phone]   [] [facility]: [phone]   [] [facility]: [phone]      Plan:Review safety measures.           Case closure    Todays OPCM Self-Management Care Plan was developed with the patients/caregivers input and was based on identified barriers from todays assessment.  Goals were written today with the patient/caregiver and the patient has agreed to work towards these goals to improve his/her  overall well-being. Patient verbalized understanding of the care plan, goals, and all of today's instructions. Encouraged patient/caregiver to communicate with his/her physician and health care team about health conditions and the treatment plan.  Provided my contact information today and encouraged patient/caregiver to call me with any questions as needed.

## 2019-07-16 ENCOUNTER — OUTPATIENT CASE MANAGEMENT (OUTPATIENT)
Dept: ADMINISTRATIVE | Facility: OTHER | Age: 79
End: 2019-07-16

## 2019-07-16 NOTE — PROGRESS NOTES
Summary: Pt reports that he is doing good today. Pt reports that he has no pains or discomforts at this time. Reviewed with pt low salt diet. Pt reports that he is compliant with taking his medications. Informed of case closure.    Interventions:Reviewed safety measures with pt.                        Case closure    Plan:na    Todays OPCM Self-Management Care Plan was developed with the patients/caregivers input and was based on identified barriers from todays assessment.  Goals were written today with the patient/caregiver and the patient has agreed to work towards these goals to improve his/her overall well-being. Patient verbalized understanding of the care plan, goals, and all of today's instructions. Encouraged patient/caregiver to communicate with his/her physician and health care team about health conditions and the treatment plan.  Provided my contact information today and encouraged patient/caregiver to call me with any questions as needed.

## 2019-07-23 ENCOUNTER — TELEPHONE (OUTPATIENT)
Dept: FAMILY MEDICINE | Facility: CLINIC | Age: 79
End: 2019-07-23

## 2019-07-23 NOTE — TELEPHONE ENCOUNTER
Due to pts insurance he is not scheduled until September;  nurse calling to see if pt can be seen sooner

## 2019-07-23 NOTE — TELEPHONE ENCOUNTER
----- Message from Conchita Franco sent at 7/23/2019 12:34 PM CDT -----  Contact: [facility] home health  Name of Who is Calling: Lilli      What is the request in detail: requesting a call back in reference to getting the pt an earlier appt than what's scheduled in September. Pt was suppose to have a hospital f/u from when 7/09. Please call to advise      Can the clinic reply by [facility]: no      What Number to Call Back if not in [facility]: [phone]

## 2019-07-24 NOTE — TELEPHONE ENCOUNTER
Spoke to pt, he will come in on wed 7/31 at 1:40; called to inform HH nurse but no answer, did LM informing of appt

## 2019-07-31 ENCOUNTER — OFFICE VISIT (OUTPATIENT)
Dept: FAMILY MEDICINE | Facility: CLINIC | Age: 79
End: 2019-07-31
Payer: MEDICARE

## 2019-07-31 ENCOUNTER — LAB VISIT (OUTPATIENT)
Dept: LAB | Facility: HOSPITAL | Age: 79
End: 2019-07-31
Attending: FAMILY MEDICINE
Payer: MEDICARE

## 2019-07-31 VITALS
TEMPERATURE: 98 F | HEIGHT: 67 IN | HEART RATE: 64 BPM | WEIGHT: 117.94 LBS | OXYGEN SATURATION: 98 % | SYSTOLIC BLOOD PRESSURE: 114 MMHG | BODY MASS INDEX: 18.51 KG/M2 | RESPIRATION RATE: 16 BRPM | DIASTOLIC BLOOD PRESSURE: 48 MMHG

## 2019-07-31 DIAGNOSIS — I50.32 CHRONIC DIASTOLIC HEART FAILURE: ICD-10-CM

## 2019-07-31 DIAGNOSIS — R07.89 ATYPICAL CHEST PAIN: Primary | ICD-10-CM

## 2019-07-31 PROBLEM — J41.0 SIMPLE CHRONIC BRONCHITIS: Status: RESOLVED | Noted: 2019-03-27 | Resolved: 2019-07-31

## 2019-07-31 LAB — BNP SERPL-MCNC: 70 PG/ML (ref 0–99)

## 2019-07-31 PROCEDURE — 3074F PR MOST RECENT SYSTOLIC BLOOD PRESSURE < 130 MM HG: ICD-10-PCS | Mod: HCNC,CPTII,S$GLB, | Performed by: FAMILY MEDICINE

## 2019-07-31 PROCEDURE — 83880 ASSAY OF NATRIURETIC PEPTIDE: CPT | Mod: HCNC

## 2019-07-31 PROCEDURE — 3078F PR MOST RECENT DIASTOLIC BLOOD PRESSURE < 80 MM HG: ICD-10-PCS | Mod: HCNC,CPTII,S$GLB, | Performed by: FAMILY MEDICINE

## 2019-07-31 PROCEDURE — 1101F PT FALLS ASSESS-DOCD LE1/YR: CPT | Mod: HCNC,CPTII,S$GLB, | Performed by: FAMILY MEDICINE

## 2019-07-31 PROCEDURE — 3074F SYST BP LT 130 MM HG: CPT | Mod: HCNC,CPTII,S$GLB, | Performed by: FAMILY MEDICINE

## 2019-07-31 PROCEDURE — 1101F PR PT FALLS ASSESS DOC 0-1 FALLS W/OUT INJ PAST YR: ICD-10-PCS | Mod: HCNC,CPTII,S$GLB, | Performed by: FAMILY MEDICINE

## 2019-07-31 PROCEDURE — 36415 COLL VENOUS BLD VENIPUNCTURE: CPT | Mod: HCNC,PO

## 2019-07-31 PROCEDURE — 99999 PR PBB SHADOW E&M-EST. PATIENT-LVL III: CPT | Mod: PBBFAC,HCNC,, | Performed by: FAMILY MEDICINE

## 2019-07-31 PROCEDURE — 99215 PR OFFICE/OUTPT VISIT, EST, LEVL V, 40-54 MIN: ICD-10-PCS | Mod: HCNC,S$GLB,, | Performed by: FAMILY MEDICINE

## 2019-07-31 PROCEDURE — 3078F DIAST BP <80 MM HG: CPT | Mod: HCNC,CPTII,S$GLB, | Performed by: FAMILY MEDICINE

## 2019-07-31 PROCEDURE — 99999 PR PBB SHADOW E&M-EST. PATIENT-LVL III: ICD-10-PCS | Mod: PBBFAC,HCNC,, | Performed by: FAMILY MEDICINE

## 2019-07-31 PROCEDURE — 99215 OFFICE O/P EST HI 40 MIN: CPT | Mod: HCNC,S$GLB,, | Performed by: FAMILY MEDICINE

## 2019-07-31 RX ORDER — LISINOPRIL 40 MG/1
40 TABLET ORAL DAILY
Refills: 11 | COMMUNITY
Start: 2019-07-09

## 2019-07-31 RX ORDER — ERYTHROMYCIN 5 MG/G
OINTMENT OPHTHALMIC
COMMUNITY
Start: 2019-05-02 | End: 2019-12-12

## 2019-07-31 RX ORDER — HYDROXYZINE PAMOATE 25 MG/1
25 CAPSULE ORAL EVERY 8 HOURS PRN
Refills: 5 | COMMUNITY
Start: 2019-07-23 | End: 2019-09-19 | Stop reason: SDUPTHER

## 2019-07-31 RX ORDER — RANOLAZINE 1000 MG/1
1000 TABLET, EXTENDED RELEASE ORAL 2 TIMES DAILY
COMMUNITY
Start: 2019-07-09 | End: 2020-07-08

## 2019-07-31 RX ORDER — FUROSEMIDE 40 MG/1
TABLET ORAL
Refills: 5 | COMMUNITY
Start: 2019-07-23

## 2019-07-31 NOTE — PROGRESS NOTES
Subjective:       Patient ID: Otis Rodriguez     Chief Complaint: Hospital Follow Up      Bob Rodriguez is a 79 y.o. male.here for follow up multiple ED visits for chest pain and allergy symptoms. Related to uncontrolled HTN.  Troponin and BNP both elevated.  Patient compliant with medication.  Denies HA today.    Review of patient's allergies indicates:  No Known Allergies    Current Outpatient Medications:     amLODIPine (NORVASC) 10 MG tablet, Take 10 mg by mouth., Disp: , Rfl:     atorvastatin (LIPITOR) 80 MG tablet, Take 80 mg by mouth., Disp: , Rfl:     buPROPion (WELLBUTRIN SR) 200 MG SR12, Take 1 tablet (200 mg total) by mouth 2 (two) times daily., Disp: 180 tablet, Rfl: 1    clopidogrel (PLAVIX) 75 mg tablet, Take 75 mg by mouth., Disp: , Rfl:     fluticasone (FLONASE) 50 mcg/actuation nasal spray, 2 sprays (100 mcg total) by Each Nare route as needed for Rhinitis., Disp: 1 Bottle, Rfl: 11    furosemide (LASIX) 40 MG tablet, Take 1 tablet (40 mg total) by mouth daily, Disp: , Rfl: 5    hydrALAZINE (APRESOLINE) 25 MG tablet, Take 25 mg by mouth 3 (three) times daily., Disp: , Rfl:     hydrOXYzine pamoate (VISTARIL) 25 MG Cap, Take 25 mg by mouth every 8 (eight) hours as needed., Disp: , Rfl: 5    levocetirizine (XYZAL) 5 MG tablet, Take 1 tablet (5 mg total) by mouth every evening., Disp: 90 tablet, Rfl: 3    lisinopril (PRINIVIL,ZESTRIL) 40 MG tablet, Take 40 mg by mouth once daily., Disp: , Rfl: 11    metoprolol succinate (TOPROL-XL) 50 MG 24 hr tablet, Take 1 tablet (50 mg total) by mouth once daily., Disp: 90 tablet, Rfl: 0    QUEtiapine (SEROQUEL) 100 MG Tab, Take 1 tablet (100 mg total) by mouth every evening., Disp: 90 tablet, Rfl: 3    ranolazine (RANEXA) 1,000 mg Tb12, Take 1,000 mg by mouth., Disp: , Rfl:     tamsulosin (FLOMAX) 0.4 mg Cap, Take 1 capsule (0.4 mg total) by mouth once daily., Disp: 90 capsule, Rfl: 3    aspirin (ECOTRIN) 325 MG EC tablet, TAKE 1 TABLET (325 MG  TOTAL) BY MOUTH ONCE DAILY., Disp: 90 tablet, Rfl: 3    dextran 70-hypromellose (TEARS) ophthalmic solution, Place 1 drop into both eyes every 6 (six) hours., Disp: 30 mL, Rfl: 0    erythromycin (ROMYCIN) ophthalmic ointment, Apply to eye., Disp: , Rfl:     oxymetazoline (AFRIN) 0.05 % nasal spray, 1 spray by Nasal route 2 (two) times daily. for 3 days, Disp: 15 mL, Rfl: 0  No current facility-administered medications for this visit.     Past Medical History:   Diagnosis Date    CAD (coronary artery disease) 1/12/2015    Cerebrovascular disease 1/12/2015    Depression     Essential hypertension, benign 1/12/2015    Other and unspecified hyperlipidemia 1/12/2015     Review of Systems   HENT: Positive for rhinorrhea.    Eyes: Positive for discharge.   Respiratory: Negative for cough and shortness of breath.    Cardiovascular: Negative for chest pain and leg swelling.   Musculoskeletal: Positive for arthralgias (left knee).   Neurological: Positive for headaches (chronic).       Objective:      Physical Exam   Constitutional: He appears well-developed and well-nourished.   HENT:   Head: Normocephalic and atraumatic.   Neck: Normal range of motion. Neck supple.   Cardiovascular: Normal rate and regular rhythm.   Pulmonary/Chest: Effort normal and breath sounds normal.   Abdominal: Soft. Bowel sounds are normal. There is no tenderness.   Musculoskeletal: He exhibits no edema or deformity.   Neurological: He is alert.   Skin: Skin is warm and dry.   Psychiatric: He has a normal mood and affect.     7/8/19   troponin 0.05  Drug screen positive for cannabis and BZD  Assessment:       1. Atypical chest pain    2. Chronic diastolic heart failure        Plan:       Otis was seen today for hospital follow up.    Diagnoses and all orders for this visit:    Atypical chest pain  Suspect related to uncontrolled HTN    Chronic diastolic heart failure  -     Patient with low BP today.  Unsure if patient may  be  duplicating medication.  No symptoms of hertfailure at present.  Obtain  Brain natriuretic peptide; Future

## 2019-08-01 ENCOUNTER — TELEPHONE (OUTPATIENT)
Dept: FAMILY MEDICINE | Facility: CLINIC | Age: 79
End: 2019-08-01

## 2019-08-01 DIAGNOSIS — R41.3 SHORT-TERM MEMORY LOSS: ICD-10-CM

## 2019-08-01 DIAGNOSIS — I50.32 CHRONIC DIASTOLIC HEART FAILURE: Primary | ICD-10-CM

## 2019-08-01 NOTE — TELEPHONE ENCOUNTER
----- Message from Nai Kwon sent at 8/1/2019  4:06 PM CDT -----  [facility]   Type: Patient Call Back    Who called: [facility] Nurse     What is the request in detail: Says  was expecting a call from her.She says the patient has ortho static hypotension. Its very bad and he is dehydrated.     Can the clinic reply by [facility]? Call     Would the patient rather a call back or a response via [facility]?  Call     Best call back number: [phone]

## 2019-08-02 NOTE — TELEPHONE ENCOUNTER
Lilli notified of instructions as noted below per , verbalized understanding stated she will call back with a reading once she see patient later today

## 2019-08-02 NOTE — TELEPHONE ENCOUNTER
Spoke with patient.  Patient reports systolic BP 94.  Advised to hold amlodipine.  Reduce furosemide 40 mg 1/2 tablet daily.  Please inform HH nurse of recommendations and report BP on tomorrow.

## 2019-08-04 ENCOUNTER — HOSPITAL ENCOUNTER (EMERGENCY)
Facility: HOSPITAL | Age: 79
Discharge: HOME | End: 2019-08-04
Attending: EMERGENCY MEDICINE
Payer: MEDICARE

## 2019-08-04 VITALS
OXYGEN SATURATION: 95 % | SYSTOLIC BLOOD PRESSURE: 168 MMHG | WEIGHT: 124 LBS | DIASTOLIC BLOOD PRESSURE: 102 MMHG | TEMPERATURE: 99 F | HEIGHT: 67 IN | HEART RATE: 68 BPM | BODY MASS INDEX: 19.46 KG/M2 | RESPIRATION RATE: 17 BRPM

## 2019-08-04 DIAGNOSIS — H57.89 EYE IRRITATION: ICD-10-CM

## 2019-08-04 DIAGNOSIS — R09.81 NASAL CONGESTION: ICD-10-CM

## 2019-08-04 DIAGNOSIS — R07.9 CHEST PAIN: ICD-10-CM

## 2019-08-04 DIAGNOSIS — J06.9 UPPER RESPIRATORY TRACT INFECTION, UNSPECIFIED TYPE: Primary | ICD-10-CM

## 2019-08-04 LAB
ALBUMIN SERPL BCP-MCNC: 4.3 G/DL (ref 3.5–5.2)
ALP SERPL-CCNC: 84 U/L (ref 55–135)
ALT SERPL W/O P-5'-P-CCNC: 22 U/L (ref 10–44)
ANION GAP SERPL CALC-SCNC: 10 MMOL/L (ref 8–16)
AST SERPL-CCNC: 32 U/L (ref 10–40)
BASOPHILS # BLD AUTO: 0.02 K/UL (ref 0–0.2)
BASOPHILS NFR BLD: 0.3 % (ref 0–1.9)
BILIRUB SERPL-MCNC: 0.2 MG/DL (ref 0.1–1)
BUN SERPL-MCNC: 16 MG/DL (ref 8–23)
CALCIUM SERPL-MCNC: 9.4 MG/DL (ref 8.7–10.5)
CHLORIDE SERPL-SCNC: 108 MMOL/L (ref 95–110)
CO2 SERPL-SCNC: 25 MMOL/L (ref 23–29)
CREAT SERPL-MCNC: 1.5 MG/DL (ref 0.5–1.4)
DIFFERENTIAL METHOD: ABNORMAL
EOSINOPHIL # BLD AUTO: 0.2 K/UL (ref 0–0.5)
EOSINOPHIL NFR BLD: 2.2 % (ref 0–8)
ERYTHROCYTE [DISTWIDTH] IN BLOOD BY AUTOMATED COUNT: 15.7 % (ref 11.5–14.5)
EST. GFR  (AFRICAN AMERICAN): 51 ML/MIN/1.73 M^2
EST. GFR  (NON AFRICAN AMERICAN): 44 ML/MIN/1.73 M^2
GLUCOSE SERPL-MCNC: 90 MG/DL (ref 70–110)
HCT VFR BLD AUTO: 36.7 % (ref 40–54)
HGB BLD-MCNC: 11.7 G/DL (ref 14–18)
LYMPHOCYTES # BLD AUTO: 1.6 K/UL (ref 1–4.8)
LYMPHOCYTES NFR BLD: 23.3 % (ref 18–48)
MCH RBC QN AUTO: 31.7 PG (ref 27–31)
MCHC RBC AUTO-ENTMCNC: 31.9 G/DL (ref 32–36)
MCV RBC AUTO: 100 FL (ref 82–98)
MONOCYTES # BLD AUTO: 0.9 K/UL (ref 0.3–1)
MONOCYTES NFR BLD: 13.1 % (ref 4–15)
NEUTROPHILS # BLD AUTO: 4.2 K/UL (ref 1.8–7.7)
NEUTROPHILS NFR BLD: 61.1 % (ref 38–73)
PLATELET # BLD AUTO: 262 K/UL (ref 150–350)
PMV BLD AUTO: 10.6 FL (ref 9.2–12.9)
POTASSIUM SERPL-SCNC: 4.3 MMOL/L (ref 3.5–5.1)
PROT SERPL-MCNC: 7.8 G/DL (ref 6–8.4)
RBC # BLD AUTO: 3.69 M/UL (ref 4.6–6.2)
SODIUM SERPL-SCNC: 143 MMOL/L (ref 136–145)
TROPONIN I SERPL DL<=0.01 NG/ML-MCNC: 0.02 NG/ML (ref 0–0.03)
WBC # BLD AUTO: 6.88 K/UL (ref 3.9–12.7)

## 2019-08-04 PROCEDURE — 93010 EKG 12-LEAD: ICD-10-PCS | Mod: HCNC,,, | Performed by: INTERNAL MEDICINE

## 2019-08-04 PROCEDURE — 25000003 PHARM REV CODE 250: Mod: HCNC | Performed by: EMERGENCY MEDICINE

## 2019-08-04 PROCEDURE — 93010 ELECTROCARDIOGRAM REPORT: CPT | Mod: HCNC,,, | Performed by: INTERNAL MEDICINE

## 2019-08-04 PROCEDURE — 85025 COMPLETE CBC W/AUTO DIFF WBC: CPT | Mod: HCNC

## 2019-08-04 PROCEDURE — 99285 EMERGENCY DEPT VISIT HI MDM: CPT | Mod: 25,HCNC

## 2019-08-04 PROCEDURE — 84484 ASSAY OF TROPONIN QUANT: CPT | Mod: HCNC

## 2019-08-04 PROCEDURE — 93005 ELECTROCARDIOGRAM TRACING: CPT | Mod: HCNC

## 2019-08-04 PROCEDURE — 80053 COMPREHEN METABOLIC PANEL: CPT | Mod: HCNC

## 2019-08-04 RX ORDER — OXYMETAZOLINE HCL 0.05 %
1 SPRAY, NON-AEROSOL (ML) NASAL 2 TIMES DAILY
Qty: 15 ML | Refills: 0 | Status: SHIPPED | OUTPATIENT
Start: 2019-08-04 | End: 2019-08-07

## 2019-08-04 RX ORDER — ASPIRIN 325 MG
325 TABLET ORAL
Status: COMPLETED | OUTPATIENT
Start: 2019-08-04 | End: 2019-08-04

## 2019-08-04 RX ADMIN — ASPIRIN 325 MG ORAL TABLET 325 MG: 325 PILL ORAL at 05:08

## 2019-08-04 NOTE — ED PROVIDER NOTES
"Encounter Date: 8/4/2019    SCRIBE #1 NOTE: I, Carolyn Bermeo, am scribing for, and in the presence of,  Koko March MD. I have scribed the following portions of the note - Other sections scribed: HPI and ROS.       History     Chief Complaint   Patient presents with    Chest Pain     Pt states "I'm not feeling too good". c/o CP that started around 8pm lastnight. States the CP "comes and goes". Denies SOB, cough or n/v. Recently discharged from hospital a couple of weeks ago for the same symptoms. Also with c/ runny eyes and nose. Hx of HTN, x3 cardiac stents and open heart sx this past Dec.     This 79 year old male with a PMHx of CAD, HTN, and HLD presents to the ED with c/o intermittent chest pain since 7:00 PM last night. The patient reports that he visited [facility] last night at 8:00 PM for the same symptoms and was sent home. The patient notes he has had a CABG and 3 cardiac stents in the past. The patient reports that the pain is not worse with movement and it stays in one place. The patient states that his cardiologist is Dr. Reynolds and he has been taking his normal medications. The patient denies a medical history of blood clots, recent surgeries, and recent travel. The patient denies cough, SOB, fever, N/V, abdominal pain, rhinorrhea, syncope, and diaphoresis. No other associated symptoms. No alleviating factors.     The history is provided by the patient. No  was used.     Review of patient's allergies indicates:  No Known Allergies  Past Medical History:   Diagnosis Date    CAD (coronary artery disease) 1/12/2015    Cerebrovascular disease 1/12/2015    Depression     Essential hypertension, benign 1/12/2015    Other and unspecified hyperlipidemia 1/12/2015     Past Surgical History:   Procedure Laterality Date    CORONARY ANGIOPLASTY WITH STENT PLACEMENT      x3    stents      stents in heart in 2011     Family History   Problem Relation Age of Onset    " "Cancer Father     Alcohol abuse Brother     Kidney disease Brother      Social History     Tobacco Use    Smoking status: Current Some Day Smoker     Packs/day: 0.50     Types: Cigarettes     Last attempt to quit: 2017     Years since quittin.5    Smokeless tobacco: Never Used    Tobacco comment: daily marijuana, history of crack cocaine abuse    Substance Use Topics    Alcohol use: Yes     Alcohol/week: 0.0 oz     Frequency: 2-4 times a month     Comment: History of heavy ETOH in past; quit 2 yrs ago    Drug use: Yes     Types: Marijuana, "Crack" cocaine     Review of Systems   Constitutional: Negative for activity change, chills, diaphoresis and fever.   HENT: Negative for congestion, drooling, ear pain, rhinorrhea, sneezing, sore throat and trouble swallowing.    Eyes: Negative for pain.   Respiratory: Negative for cough, chest tightness, shortness of breath, wheezing and stridor.    Cardiovascular: Positive for chest pain. Negative for palpitations and leg swelling.   Gastrointestinal: Negative for abdominal distention, abdominal pain, constipation, diarrhea, nausea and vomiting.   Genitourinary: Negative for difficulty urinating, dysuria, frequency and urgency.   Musculoskeletal: Negative for arthralgias, back pain, myalgias, neck pain and neck stiffness.   Skin: Negative for pallor, rash and wound.   Neurological: Negative for dizziness, syncope, weakness, light-headedness, numbness and headaches.   All other systems reviewed and are negative.      Physical Exam     Initial Vitals [19 0435]   BP Pulse Resp Temp SpO2   (!) 193/85 74 20 98 °F (36.7 °C) 96 %      MAP       --         Physical Exam    Constitutional: He appears well-developed and well-nourished. He is not diaphoretic. No distress.   HENT:   Head: Normocephalic.   Right Ear: External ear normal.   Left Ear: External ear normal.   Mouth/Throat: Oropharynx is clear and moist.   Rhinorrhea   Eyes: Conjunctivae and EOM are " normal. Pupils are equal, round, and reactive to light. Right eye exhibits no discharge. Left eye exhibits no discharge. No scleral icterus.   Neck: Normal range of motion. Neck supple. No JVD present.   Cardiovascular: Normal rate, regular rhythm, normal heart sounds and intact distal pulses. Exam reveals no gallop and no friction rub.    No murmur heard.  Pulmonary/Chest: Breath sounds normal. No stridor. No respiratory distress. He has no wheezes. He has no rhonchi. He has no rales. He exhibits no tenderness.   Abdominal: He exhibits no distension and no mass. There is no tenderness. There is no rebound and no guarding.   Musculoskeletal: Normal range of motion. He exhibits no edema or tenderness.   Neurological: He is alert and oriented to person, place, and time. He has normal strength. No cranial nerve deficit or sensory deficit.   Skin: Skin is warm and dry. No rash noted. No erythema. No pallor.   Psychiatric: He has a normal mood and affect. His behavior is normal. Judgment and thought content normal.         ED Course   Procedures  Labs Reviewed   CBC W/ AUTO DIFFERENTIAL - Abnormal; Notable for the following components:       Result Value    RBC 3.69 (*)     Hemoglobin 11.7 (*)     Hematocrit 36.7 (*)     Mean Corpuscular Volume 100 (*)     Mean Corpuscular Hemoglobin 31.7 (*)     Mean Corpuscular Hemoglobin Conc 31.9 (*)     RDW 15.7 (*)     All other components within normal limits   COMPREHENSIVE METABOLIC PANEL - Abnormal; Notable for the following components:    Creatinine 1.5 (*)     eGFR if  51 (*)     eGFR if non  44 (*)     All other components within normal limits   TROPONIN I     EKG Readings: (Independently Interpreted)   Initial Reading: No STEMI. Ectopy: PVCs.   EKG reviewed and interpreted by me shows sinus rhythm at a rate of 77.  P.r., QT intervals within normal limits.  The QRS is prolonged likely to incomplete right bundle-branch block.  There is normal  axis.  There is normal R-wave progression.  There are T-wave inversions and ST depressions in the lateral leads as well as inferior leads similar morphology to prior EKG from January 2019.         Imaging Results          X-Ray Chest AP Portable (Final result)  Result time 08/04/19 05:27:51    Final result by David Snow MD (08/04/19 05:27:51)                 Impression:      Stable appearance.      Electronically signed by: David Snow MD  Date:    08/04/2019  Time:    05:27             Narrative:    EXAMINATION:  XR CHEST AP PORTABLE    CLINICAL HISTORY:  Chest Pain;    TECHNIQUE:  Single frontal view of the chest was performed.    COMPARISON:  01/03/2019    FINDINGS:  Sternotomy.  Lungs are clear.  No evidence for congestion, effusion, or infiltrate.  Osseous thorax intact.  Cardiovascular silhouette unremarkable.  No gross effusion.  No focal lobar consolidation.                                 Medical Decision Making:   ED Management:  This is the emergent evaluation of a 79-year-old male presents emergency department with feeling of drainage of this size as well as congestion and rhinorrhea of his nose. He also complains of intermittent in min chest pain lasting seconds at a time occurring toe several times per hour.  Differential diagnosis at the time of initial evaluation included, but was not limited to:  Viral upper respiratory infection, pneumonia, acute myocardial infarction.  I considered but doubt pulmonary embolus.  Patient's symptoms have resolved regard to chest pain.  His chest pain sounds very atypical for ischemic chest pain.  Patient has had significant workup for heart disease.  He also has significant history with CABG and multiple stents.    He states that he is followed by Dr. Reynolds from Cardiology.  Patient has had symptoms for greater than 12 hr and still does not have an elevated troponin.  I do not suspect that this is acute coronary syndrome.  His EKG is abnormal but not  significantly changed from prior.  His pain sounds very atypical for ischemic chest pain.  It is lasting seconds at a time and is not exertional.  It does not radiate.  There is no nausea, weakness, sweating, or shortness of breath with it.  Patient primarily appears to be concerned about irritation to his bilateral eyes.  I do not see any purulent drainage. Also concerned about rhinorrhea nasal congestion.  This is likely upper respiratory infection which is most likely viral in nature.  I will treat the patient symptomatically.  I did advised to follow up with regular doctor and with his cardiologist next week for further instructions and return for new or worsening symptoms, especially any fever, shortness of breath, or persistent chest pain or chest pressure, especially any chest pain with exertion.                     Clinical Impression:       ICD-10-CM ICD-9-CM   1. Upper respiratory tract infection, unspecified type J06.9 465.9   2. Chest pain R07.9 786.50   3. Nasal congestion R09.81 478.19   4. Eye irritation H57.89 379.99                                Koko March Jr., MD  08/04/19 [id number]

## 2019-08-04 NOTE — ED PROVIDER NOTES
"Encounter Date: 2019    SCRIBE #1 NOTE: I, Justin Pena, am scribing for, and in the presence of,  Amarjit CARLSON) . I have scribed the entire note.       History     Chief Complaint   Patient presents with    Chest Pain     Pt states "I'm not feeling too good". c/o CP that started around 8pm lastnight. States the CP "comes and goes". Denies SOB, cough or n/v. Recently discharged from hospital a couple of weeks ago for the same symptoms. Also with c/ runny eyes and nose. Hx of HTN, x3 cardiac stents and open heart sx this past Dec.     Patient is a 79 year old male with a PMHx of HTN, HLD, and CAD who presents to the ED with complaints of chest pain that began last night. Pain is intermittent. Patient denies SOB, cough, nausea, or vomiting. He was admitted and discharge for similar complaints a couple of weeks ago. Has had a CABG and 3 cardiac stents placed in the past.      The history is provided by the patient. No  was used.     Review of patient's allergies indicates:  No Known Allergies  Past Medical History:   Diagnosis Date    CAD (coronary artery disease) 2015    Cerebrovascular disease 2015    Depression     Essential hypertension, benign 2015    Other and unspecified hyperlipidemia 2015     Past Surgical History:   Procedure Laterality Date    CORONARY ANGIOPLASTY WITH STENT PLACEMENT      x3    stents      stents in heart in      Family History   Problem Relation Age of Onset    Cancer Father     Alcohol abuse Brother     Kidney disease Brother      Social History     Tobacco Use    Smoking status: Current Some Day Smoker     Packs/day: 0.50     Types: Cigarettes     Last attempt to quit: 2017     Years since quittin.5    Smokeless tobacco: Never Used    Tobacco comment: daily marijuana, history of crack cocaine abuse    Substance Use Topics    Alcohol use: Yes     Alcohol/week: 0.0 oz     Frequency: 2-4 times a month     " "Comment: History of heavy ETOH in past; quit 2 yrs ago    Drug use: Yes     Types: Marijuana, "Crack" cocaine     Review of Systems   Cardiovascular: Positive for chest pain.       Physical Exam     Initial Vitals [08/04/19 0435]   BP Pulse Resp Temp SpO2   (!) 193/85 74 20 98 °F (36.7 °C) 96 %      MAP       --         Physical Exam    Nursing note and vitals reviewed.  Constitutional: He appears well-developed and well-nourished. No distress.   HENT:   Head: Normocephalic and atraumatic.   Eyes: Pupils are equal, round, and reactive to light.   Neck: Normal range of motion. Neck supple.   Cardiovascular: Normal rate and regular rhythm. Exam reveals no gallop and no friction rub.    No murmur heard.  Pulmonary/Chest: Breath sounds normal. No respiratory distress.   Abdominal: Soft. Bowel sounds are normal.   Musculoskeletal: Normal range of motion.   Neurological: He is alert and oriented to person, place, and time.   Skin: Skin is warm and dry.   Psychiatric: He has a normal mood and affect.         ED Course   Procedures  Labs Reviewed   CBC W/ AUTO DIFFERENTIAL   COMPREHENSIVE METABOLIC PANEL   TROPONIN I          Imaging Results    None          Medical Decision Making:   Clinical Tests:   Lab Tests: Ordered and Reviewed  Radiological Study: Ordered and Reviewed  Medical Tests: Ordered and Reviewed  ED Management:  0539: Waiting on the patients Troponin prior to determining dispo.             Scribe Attestation:   Scribe #1: I performed the above scribed service and the documentation accurately describes the services I performed. I attest to the accuracy of the note.     I, ***, personally performed the services described in this documentation. All medical record entries made by the scribe were at my direction and in my presence.  I have reviewed the chart and agree that the record reflects my personal performance and is accurate and complete             Clinical Impression:       ICD-10-CM ICD-9-CM   1. Chest " pain R07.9 786.50

## 2019-08-04 NOTE — DISCHARGE INSTRUCTIONS
Please follow up early next week with your regular physician as well as her cardiologist.  Return for new or worsening symptoms such as fever, chills, shortness of breath, or persistent chest pain, especially chest pain with exertion.

## 2019-08-06 ENCOUNTER — TELEPHONE (OUTPATIENT)
Dept: FAMILY MEDICINE | Facility: CLINIC | Age: 79
End: 2019-08-06

## 2019-08-06 NOTE — TELEPHONE ENCOUNTER
----- Message from Selma Miles MD sent at 8/2/2019  6:57 PM CDT -----  No evidence of heart failure at this time

## 2019-08-06 NOTE — TELEPHONE ENCOUNTER
Sat 8/3 readings were sitting 121/58 standing 94/56 70HR     8/1 98/56     nurse asking which BP should the patient be taking for clarification?

## 2019-08-06 NOTE — TELEPHONE ENCOUNTER
Lilli notified of the below per Lilli Degroot stated she would like to know specifically about the below medications stated the patient has not been taking them, also stated patient has not been taking the 20 mg of the lasix,  please advise, thank you   Hydralazine 25 mg TID   Metoprolol 50 mg daily   Lisinopril 40 mg daily

## 2019-08-09 NOTE — TELEPHONE ENCOUNTER
Please advise HH patient needs to follow up with his cardiologist and notify them of low blood pressure readings and the need for recommendations on what medications he absolutely needs to take it this time.

## 2019-08-26 ENCOUNTER — TELEPHONE (OUTPATIENT)
Dept: FAMILY MEDICINE | Facility: CLINIC | Age: 79
End: 2019-08-26

## 2019-08-26 ENCOUNTER — OFFICE VISIT (OUTPATIENT)
Dept: FAMILY MEDICINE | Facility: CLINIC | Age: 79
End: 2019-08-26
Payer: MEDICARE

## 2019-08-26 VITALS
HEIGHT: 67 IN | RESPIRATION RATE: 17 BRPM | TEMPERATURE: 98 F | SYSTOLIC BLOOD PRESSURE: 110 MMHG | BODY MASS INDEX: 18.24 KG/M2 | DIASTOLIC BLOOD PRESSURE: 56 MMHG | WEIGHT: 116.19 LBS | OXYGEN SATURATION: 99 % | HEART RATE: 72 BPM

## 2019-08-26 DIAGNOSIS — J01.90 ACUTE NON-RECURRENT SINUSITIS, UNSPECIFIED LOCATION: Primary | ICD-10-CM

## 2019-08-26 PROCEDURE — 3288F PR FALLS RISK ASSESSMENT DOCUMENTED: ICD-10-PCS | Mod: HCNC,CPTII,S$GLB, | Performed by: FAMILY MEDICINE

## 2019-08-26 PROCEDURE — 1100F PTFALLS ASSESS-DOCD GE2>/YR: CPT | Mod: HCNC,CPTII,S$GLB, | Performed by: FAMILY MEDICINE

## 2019-08-26 PROCEDURE — 1100F PR PT FALLS ASSESS DOC 2+ FALLS/FALL W/INJURY/YR: ICD-10-PCS | Mod: HCNC,CPTII,S$GLB, | Performed by: FAMILY MEDICINE

## 2019-08-26 PROCEDURE — 99999 PR PBB SHADOW E&M-EST. PATIENT-LVL III: CPT | Mod: PBBFAC,HCNC,, | Performed by: FAMILY MEDICINE

## 2019-08-26 PROCEDURE — 3078F PR MOST RECENT DIASTOLIC BLOOD PRESSURE < 80 MM HG: ICD-10-PCS | Mod: HCNC,CPTII,S$GLB, | Performed by: FAMILY MEDICINE

## 2019-08-26 PROCEDURE — 99214 PR OFFICE/OUTPT VISIT, EST, LEVL IV, 30-39 MIN: ICD-10-PCS | Mod: HCNC,S$GLB,, | Performed by: FAMILY MEDICINE

## 2019-08-26 PROCEDURE — 3074F PR MOST RECENT SYSTOLIC BLOOD PRESSURE < 130 MM HG: ICD-10-PCS | Mod: HCNC,CPTII,S$GLB, | Performed by: FAMILY MEDICINE

## 2019-08-26 PROCEDURE — 99214 OFFICE O/P EST MOD 30 MIN: CPT | Mod: HCNC,S$GLB,, | Performed by: FAMILY MEDICINE

## 2019-08-26 PROCEDURE — 99999 PR PBB SHADOW E&M-EST. PATIENT-LVL III: ICD-10-PCS | Mod: PBBFAC,HCNC,, | Performed by: FAMILY MEDICINE

## 2019-08-26 PROCEDURE — 3288F FALL RISK ASSESSMENT DOCD: CPT | Mod: HCNC,CPTII,S$GLB, | Performed by: FAMILY MEDICINE

## 2019-08-26 PROCEDURE — 3078F DIAST BP <80 MM HG: CPT | Mod: HCNC,CPTII,S$GLB, | Performed by: FAMILY MEDICINE

## 2019-08-26 PROCEDURE — 3074F SYST BP LT 130 MM HG: CPT | Mod: HCNC,CPTII,S$GLB, | Performed by: FAMILY MEDICINE

## 2019-08-26 RX ORDER — DICLOFENAC SODIUM 50 MG/1
50 TABLET, DELAYED RELEASE ORAL 2 TIMES DAILY PRN
Refills: 0 | COMMUNITY
Start: 2019-08-06 | End: 2019-12-12

## 2019-08-26 RX ORDER — ACETAMINOPHEN 500 MG
TABLET ORAL
COMMUNITY
Start: 2019-08-19

## 2019-08-26 RX ORDER — IBUPROFEN 200 MG
1 TABLET ORAL
COMMUNITY
Start: 2019-08-12 | End: 2019-09-11

## 2019-08-26 RX ORDER — AMOXICILLIN 500 MG/1
CAPSULE ORAL
Refills: 0 | COMMUNITY
Start: 2019-08-15 | End: 2019-09-25 | Stop reason: ALTCHOICE

## 2019-08-26 NOTE — PROGRESS NOTES
Subjective:       Patient ID: Otis Rodriguez     Chief Complaint: Hospital Follow Up; Chest Pain; and Headache      Bob Rodriguez is a 79 y.o. male.here for follow up ER visit for acute onset of HA, CP and SOB.  Found to have sinusitis.  CT head revealed no acute abnormalities.    Review of patient's allergies indicates:  No Known Allergies    Current Outpatient Medications:     amoxicillin (AMOXIL) 500 MG capsule, TAKE 1 CAPSULE BY MOUTH 3 TIMES A DAY FOR 5 days, Disp: , Rfl: 0    atorvastatin (LIPITOR) 80 MG tablet, Take 40 mg by mouth once daily. , Disp: , Rfl:     buPROPion (WELLBUTRIN SR) 200 MG SR12, Take 1 tablet (200 mg total) by mouth 2 (two) times daily., Disp: 180 tablet, Rfl: 1    clopidogrel (PLAVIX) 75 mg tablet, Take 75 mg by mouth once daily. , Disp: , Rfl:     dextran 70-hypromellose (TEARS) ophthalmic solution, Place 1 drop into both eyes every 6 (six) hours., Disp: 30 mL, Rfl: 0    diclofenac (VOLTAREN) 50 MG EC tablet, Take 50 mg by mouth 2 (two) times daily as needed., Disp: , Rfl: 0    fluticasone (FLONASE) 50 mcg/actuation nasal spray, 2 sprays (100 mcg total) by Each Nare route as needed for Rhinitis., Disp: 1 Bottle, Rfl: 11    furosemide (LASIX) 40 MG tablet, Take 1 tablet (40 mg total) by mouth daily, Disp: , Rfl: 5    hydrALAZINE (APRESOLINE) 25 MG tablet, Take 25 mg by mouth 3 (three) times daily., Disp: , Rfl:     hydrOXYzine pamoate (VISTARIL) 25 MG Cap, Take 25 mg by mouth every 8 (eight) hours as needed., Disp: , Rfl: 5    levocetirizine (XYZAL) 5 MG tablet, Take 1 tablet (5 mg total) by mouth every evening., Disp: 90 tablet, Rfl: 3    lisinopril (PRINIVIL,ZESTRIL) 40 MG tablet, Take 40 mg by mouth once daily., Disp: , Rfl: 11    metoprolol succinate (TOPROL-XL) 50 MG 24 hr tablet, Take 1 tablet (50 mg total) by mouth once daily., Disp: 90 tablet, Rfl: 0    QUEtiapine (SEROQUEL) 100 MG Tab, Take 1 tablet (100 mg total) by mouth every evening., Disp: 90 tablet, Rfl:  3    ranolazine (RANEXA) 1,000 mg Tb12, Take 1,000 mg by mouth 2 (two) times daily. , Disp: , Rfl:     tamsulosin (FLOMAX) 0.4 mg Cap, Take 1 capsule (0.4 mg total) by mouth once daily., Disp: 90 capsule, Rfl: 3    amLODIPine (NORVASC) 10 MG tablet, Take 10 mg by mouth., Disp: , Rfl:     aspirin (ECOTRIN) 325 MG EC tablet, TAKE 1 TABLET (325 MG TOTAL) BY MOUTH ONCE DAILY., Disp: 90 tablet, Rfl: 3    blood pressure monitor Kit, , Disp: , Rfl:     erythromycin (ROMYCIN) ophthalmic ointment, Apply to eye., Disp: , Rfl:     nicotine (NICODERM CQ) 14 mg/24 hr, Place 1 patch onto the skin., Disp: , Rfl:     Past Medical History:   Diagnosis Date    CAD (coronary artery disease) 1/12/2015    Cerebrovascular disease 1/12/2015    Depression     Essential hypertension, benign 1/12/2015    Other and unspecified hyperlipidemia 1/12/2015     Review of Systems   Constitutional: Negative for fever.   Cardiovascular: Negative for chest pain.       Objective:      Physical Exam   Constitutional: He appears well-developed and well-nourished.   HENT:   Head: Normocephalic and atraumatic.   Neck: Normal range of motion. Neck supple.   Cardiovascular: Normal rate and regular rhythm.   Pulmonary/Chest: Effort normal and breath sounds normal.   Abdominal: Soft. Bowel sounds are normal. There is no tenderness.   Musculoskeletal: He exhibits no edema or deformity.   Neurological: He is alert.   Skin: Skin is warm and dry.   Psychiatric: He has a normal mood and affect.     CT head 8/14/19:    IMPRESSION:   1. No acute intracranial abnormality or significant change in the brain parenchyma from 4/22/2019 with findings as detailed above..  2. Mild paranasal sinus mucoperiosteal thickening.  Assessment:       1. Acute non-recurrent sinusitis, unspecified location        Plan:       Otis was seen today for hospital follow up, chest pain and headache.    Diagnoses and all orders for this visit:    Acute non-recurrent sinusitis,  unspecified location  Complete course of amoxilcillin.  Patient clinically stable

## 2019-08-26 NOTE — TELEPHONE ENCOUNTER
----- Message from Macrina Bull sent at 8/26/2019  3:51 PM CDT -----  Contact: Lilli hernández/Hannah Link  [phone]  Type:  Patient Returning Call    Who Called: Lilli hernández/Benito    Who Left Message for Patient: Dr Miles    Does the patient know what this is regarding?: Patient    Would the patient rather a call back or a response via [facility]? Call back    Best Call Back Number:[phone]

## 2019-08-26 NOTE — TELEPHONE ENCOUNTER
Spoke to  nurse regarding d/c diclofenac from pt medications due to heart problems; she stated med was removed, also states she will bring pt pill tray to fill for pt; also informed her to find out if pt has cardiologist he has been seeing; she will call back tomorrow

## 2019-09-05 ENCOUNTER — OUTPATIENT CASE MANAGEMENT (OUTPATIENT)
Dept: ADMINISTRATIVE | Facility: OTHER | Age: 79
End: 2019-09-05

## 2019-09-05 NOTE — LETTER
September 6, 2019    Otis Rodriguez  [address]  [place] [address]             [facility]  [address]  [place] [address] Dear  Otis Rodriguez,      I have been assigned as your  with [facility]'s Outpatient Complex Case Management Department. We received a referral to call you to discuss your medical history. I attempted to reach you by telephone, but I was unsuccessful. Please call our department so that we can go over some questions with you regarding your health.     The Outpatient Case Management department can be reached at [phone] from 8:00am to 4:30pm on Monday thru Friday. [facility] also has a program where a nurse is available 24/7 to answer questions or provider medical advice. Their number is [phone].     Thanks,      Chantel Russ, RN  Outpatient Case Management  [phone]

## 2019-09-05 NOTE — LETTER
September 9, 2019    Otis Rodriguez  [address]  [place] [address]             [facility]  [address]  [place] [address] Dear MrRocío Rodriguez,    Welcome to [facility]s Complex Care Management Program.  It was a pleasure talking with you today.  My name is Chantel Russ, RN and I look forward to being your Care Manager.  My goal is to help you function at the healthiest and highest level possible.  You can contact me directly at [phone].    As an [facility] patient with [employer], some of the services we may be able to provide include:      Development of an individualized care plan with a Registered Nurse    Connection with a    Connection with available resources and services     Coordinate communication among your care team members    Provide coaching and education    Help you understand your doctors treatment plan   Help you obtain information about your insurance coverage.     All services provided by [facility]s Complex Care Managers and other care team members are coordinated with and communicated to your primary care team.    As part of your enrollment, you will be receiving education materials and more information about these services in your [facility] account, by phone or through the mail.  If you do not wish to participate or receive information, please contact our office at [phone].      Sincerely,        Chantel Russ, RN  [facility]   Out-patient RN Complex Care Manager

## 2019-09-09 PROCEDURE — G0179 MD RECERTIFICATION HHA PT: HCPCS | Mod: ,,, | Performed by: FAMILY MEDICINE

## 2019-09-09 PROCEDURE — G0179 PR HOME HEALTH MD RECERTIFICATION: ICD-10-PCS | Mod: ,,, | Performed by: FAMILY MEDICINE

## 2019-09-09 NOTE — PATIENT INSTRUCTIONS
Fall Due to Dizziness, Weakness, or Loss of Balance  The symptoms that led to your fall have been evaluated. Your doctor feels it is safe for you to return home.  Many things can cause you to become dizzy. Everyone means a little something different by the word dizzy. People may describe their symptoms using these words:  · It doesnt feel right in my head  · It feels like spinning in my head  · It seems like the room is spinning  · My balance feels off  · I feel lightheaded, like I am going to pass out  All these descriptions can have real causes.     Your balance mechanism is in your inner ear. Anything disturbing it can make you feel dizzy, whether it is from a cold, an injury, or many other things. Anything that causes your blood pressure to drop suddenly can make you feel lightheaded, or like you are going to faint. This is because at that moment there might not be enough blood flowing to your brain. Causes include:  · Medicines  · Dehydration  · Standing up or bending over too quickly  · Becoming overheated  · Taking a hot shower or bath  · Straining hard while lifting something or using the toilet  · Strokes, heart attack, heart valve disease, very slow or very fast heart rate  · Low blood sugar  · Ear infection  · Hyperventilation  · Anemia  · Trauma  · Infection  · Panic attack  · Pregnancy  You may be at risk of repeat falls. Take precautions described below to prevent another fall.  Home care  · If you become lightheaded or dizzy, lie down immediately or sit and lean forward with your head down. It is better to do this than fall and seriously hurt or injure yourself.  · Rest today. When changing position, take a moment to be sure any dizziness goes away before standing and walking.  · If you have been prescribed a walker, be sure to use it whenever you walk, even if it is a short distance.  · If you were injured during the fall, follow the advice from your doctor regarding care of your injury.  · Be  sure your doctor knows all the medicines, herbs, and supplements you take. Some medicines can cause dizziness.  Follow-up care  Unless given other advice, call your doctor on the next office day to advise of your fall and to schedule an appointment. You may require further treatment for the underlying condition that caused todays fall.  If X-ray or a CT scan were done, you will be notified of the results, especially if it affects treatment.  Call 911  Call 911 if any of these occur:  · Trouble breathing  · Confused or difficulty arousing  · Fainting or loss of consciousness  · Rapid or very slow heart rate  · Seizure  · Difficulty with speech or vision, weakness of an arm or leg  · Difficulty walking or talking, loss of balance  · Numbness or weakness in one side of your body, facial droop  When to seek medical advice  Call your healthcare provider right away if any of the following occur:  · Another fall  · Continued dizzy spells  · Severe headache  · Blood in vomit or stools (black or red color)  Date Last Reviewed: 11/5/2015  © 9026-4808 [employer]. [address] [place] [address]. All rights reserved. This information is not intended as a substitute for professional medical care. Always follow your healthcare professional's instructions.        Preventing Falls: Are You At Risk of Falling?     Ask for help to reduce risk of falling in your home.     As you get older, you're not as steady on your feet as you once were. And you may have health problems you didn't have when you were younger. So, it's not surprising that older people are more likely to trip and fall. Falling can be very serious. It can change your overall health and quality of life. That's why it's important to be aware of your own risk of falling.  The dangers of falling  Falls are one of the main causes of injury in people over age 65. An older person who falls may take longer to get better than a younger person. And,  "after a fall, an older person is more likely to have problems that don't go away. So, preventing falls can help you avoid serious health problems.  Are you at risk of falling?  Answer these questions to rate your level of risk.  · Are you a woman?  · Have you fallen or stumbled in the last year?  · Are you over age 65?  · Are you ever dizzy or lightheaded with standing?  · Do you have a hard time getting in and out of the bathtub or on and off the toilet?  · Do you lean on objects to help you get around? Or do you use a cane or walker?  · Do you have vision or hearing problems? For example, do you need new glasses or hearing aids?  · Do you have 2 or more long-lasting (chronic) medical conditions?  · Do you take 3 or more medicines?  · Have you felt depressed recently?  · Have you had more trouble with your memory in recent months?  · Are there hazards in your home that might cause you to fall, such as loose rugs or poor lighting?  · Do you have a pet that jumps on you or might trip you?  · Have you stopped getting regular exercise?  · Do you have diabetes?   · Do you have a neurologic disease, such as Parkinson or Alzheimer disease?   · Do you drink alcohol?  · Do you wear athletic shoes or slippers, or go barefoot at home?  You can help prevent falls  If you answered "yes" to any of the above questions, you should take steps to reduce your risk of a fall. Monitoring health conditions and keeping walkways in your home free of clutter are just 2 ways. Changing is sometimes easier said than done. But keep in mind that even small changes can make you less likely to fall.  The fear of falling  It's normal to be scared of falling, especially if you've fallen before. But being afraid can actually make you more likely to fall. This is because:  · Fear might cause you to become less active. Being less active can lead to a loss of strength and balance.  · Fear can lead to isolation from others, depression, or the use of " more medicines or alcohol. And all these things make falling even more likely.  To break the cycle, learn more about ways to avoid falling. As you take control, you may find yourself feeling less afraid.   Date Last Reviewed: 6/12/2015  © 1854-3497 [employer]. [address] [place] [address], [place] [address]. All rights reserved. This information is not intended as a substitute for professional medical care. Always follow your healthcare professional's instructions.        Preventing Falls: How to Prepare and What to Do    Falling is not something you want to think about. But it can make a big difference to plan ahead. If you're prepared, you'll know how to get help. And you'll be less likely to panic if you fall. This means you'll be able to do what's needed to get help right away.  How to prepare  · Have someone check on you daily.  · Keep a list of emergency numbers near the phone.  · Always have a way to call for help. Keep a cell phone with you at all times. Or talk with your healthcare provider about how to set up a home monitoring service. This involves wearing a small device around your neck or wrist. If you fall, you can press the button on the device. This alerts emergency responders.  · Talk with your healthcare provider about an exercise program that's right for you. Regular exercise may reduce the risk of falling and the risk for injury related to a fall.  · It's important to have good lighting in your home. Avoid using throw rugs, because they can raise your risk of tripping and falling. Add grab bars in the bathroom to help reduce the risk of falling. Small changes can make your home safer. Talk with your healthcare provider about making your home safer.  What to do if you fall  Above all, try to stay calm:  · If you start to fall, try to relax your body. This will reduce the impact of the fall.  · After you fall, press your monitor button, or phone for help.  · Don't rush to get up. First,  make sure you're not hurt.  · Roll onto your side, then crawl to a chair. Pull yourself up onto the chair slowly.  · You should be checked if you struck your head, lost consciousness, were confused afterward, or have any other concerns for injury.  · Be sure to tell your doctor that you fell.  A note to family and friends  If you're with a loved one when he or she starts to fall, don't try to stop the fall. Ease the person to the floor carefully, so neither of you gets hurt. Don't leave the person alone. And don't try to move him or her. Put a pillow under his or her head. Check for injuries. If help is needed right away, be sure to call 911.   Date Last Reviewed: 6/13/2015  © 5143-8983 [employer]. [address] [place] [address], [place] [address]. All rights reserved. This information is not intended as a substitute for professional medical care. Always follow your healthcare professional's instructions.

## 2019-09-09 NOTE — PROGRESS NOTES
Summary: Pt reports that he gives permission to speak with his brother Kodi. Pt reports that he lives alone in an apartment. Pt reports that he uses a walker to assist with ambulation. Pt reports that he is having frequent falls. Pt reports that he has knee pain due to the falls. Pt reports that he gets dizzy at times and fall. Pt reports that this is not a new problem.Pt reports that he forgot his cane at the hospital. Pt reports that he is in fair health status when compared to others his age. Pt reports that he is taking his medications as prescribed. Pt reports that he will completed the medication rec another day. Pt reports that he want to get in the bed. Pt reports that he also has to charge his telephone at this time. Reviewed pt's problem list with the pt: cerebrovascular disease, MDD, substance abuse, HTN, CAD, and hyperlipidemia. Pt reports that the home health nurse visits about 3 times a week. Pt reports that he has a good appetite. Pt reports that he is trying to follow a low salt diet. Pt reports that he has a diaster plan. Pt agrees to OPCM services.    Interventions: Reviewed OPCM services                         Mailed [employer] materials                         Educated pt on fall prevent.  Plan:Completed med rec           Schedule a home /office vs with pt for medication management.    [facility] Self-Management Care Plan was developed with the patients/caregivers input and was based on identified barriers from todays assessment.  Goals were written today with the patient/caregiver and the patient has agreed to work towards these goals to improve his/her overall well-being. Patient verbalized understanding of the care plan, goals, and all of today's instructions. Encouraged patient/caregiver to communicate with his/her physician and health care team about health conditions and the treatment plan.  Provided my contact information today and encouraged patient/caregiver to call me with any  questions as needed.

## 2019-09-13 RX ORDER — HYDROXYZINE PAMOATE 25 MG/1
CAPSULE ORAL
Qty: 30 CAPSULE | OUTPATIENT
Start: 2019-09-13

## 2019-09-16 ENCOUNTER — TELEPHONE (OUTPATIENT)
Dept: FAMILY MEDICINE | Facility: CLINIC | Age: 79
End: 2019-09-16

## 2019-09-16 NOTE — TELEPHONE ENCOUNTER
Issue clarified. Patient advise that his Lisinopril and Flomax will be delivered on 09/17/2019. Patient verbalized understanding.

## 2019-09-16 NOTE — TELEPHONE ENCOUNTER
----- Message from Connie Tsai sent at 9/16/2019 10:42 AM CDT -----  Contact: Pt   Name of Who is Calling: DWAYNE ZARATE [[id number]]    What is the request in detail:DWAYNE ZARATE [[id number]] is requesting a call back regarding his medication ..... Please contact to further discuss and advise      Can the clinic reply by [facility]:  No     [facility]  [phone]

## 2019-09-19 ENCOUNTER — OUTPATIENT CASE MANAGEMENT (OUTPATIENT)
Dept: ADMINISTRATIVE | Facility: OTHER | Age: 79
End: 2019-09-19

## 2019-09-19 NOTE — PROGRESS NOTES
Summary: Pt reports that he is currently in the tub soaking in hot water. Reports that the water helps to decrease the pain in his knee and his right side. Pt does not report any recent falls.     Interventions:Educated pt on some of the risk factors for falls. Pt able to verbalize that his age and complaint of dizziness are fall risk factors that he has.                        Change to episodic    Plan: Continue to educate pt on fall prevention    Todays OPCM Self-Management Care Plan was developed with the patients/caregivers input and was based on identified barriers from todays assessment.  Goals were written today with the patient/caregiver and the patient has agreed to work towards these goals to improve his/her overall well-being. Patient verbalized understanding of the care plan, goals, and all of today's instructions. Encouraged patient/caregiver to communicate with his/her physician and health care team about health conditions and the treatment plan.  Provided my contact information today and encouraged patient/caregiver to call me with any questions as needed.

## 2019-09-20 RX ORDER — HYDROXYZINE PAMOATE 25 MG/1
CAPSULE ORAL
Qty: 30 CAPSULE | Refills: 0 | Status: SHIPPED | OUTPATIENT
Start: 2019-09-20 | End: 2019-12-12

## 2019-09-25 ENCOUNTER — HOSPITAL ENCOUNTER (OUTPATIENT)
Dept: RADIOLOGY | Facility: HOSPITAL | Age: 79
Discharge: HOME | End: 2019-09-25
Attending: FAMILY MEDICINE
Payer: MEDICARE

## 2019-09-25 ENCOUNTER — OFFICE VISIT (OUTPATIENT)
Dept: FAMILY MEDICINE | Facility: CLINIC | Age: 79
End: 2019-09-25
Payer: MEDICARE

## 2019-09-25 VITALS
SYSTOLIC BLOOD PRESSURE: 170 MMHG | WEIGHT: 106.25 LBS | BODY MASS INDEX: 16.67 KG/M2 | HEART RATE: 68 BPM | DIASTOLIC BLOOD PRESSURE: 70 MMHG | HEIGHT: 67 IN | OXYGEN SATURATION: 98 % | RESPIRATION RATE: 16 BRPM | TEMPERATURE: 99 F

## 2019-09-25 DIAGNOSIS — Z23 NEED FOR INFLUENZA VACCINATION: Primary | ICD-10-CM

## 2019-09-25 DIAGNOSIS — R07.81 RIB PAIN ON RIGHT SIDE: ICD-10-CM

## 2019-09-25 DIAGNOSIS — Z91.199 PERSONAL HISTORY OF NONCOMPLIANCE WITH MEDICAL TREATMENT AND REGIMEN: ICD-10-CM

## 2019-09-25 DIAGNOSIS — I10 UNCONTROLLED HYPERTENSION: ICD-10-CM

## 2019-09-25 PROCEDURE — 99999 PR PBB SHADOW E&M-EST. PATIENT-LVL IV: ICD-10-PCS | Mod: PBBFAC,HCNC,, | Performed by: FAMILY MEDICINE

## 2019-09-25 PROCEDURE — 1101F PT FALLS ASSESS-DOCD LE1/YR: CPT | Mod: HCNC,CPTII,S$GLB, | Performed by: FAMILY MEDICINE

## 2019-09-25 PROCEDURE — 90662 FLU VACCINE - HIGH DOSE (65+) PRESERVATIVE FREE IM: ICD-10-PCS | Mod: HCNC,S$GLB,, | Performed by: FAMILY MEDICINE

## 2019-09-25 PROCEDURE — 3074F PR MOST RECENT SYSTOLIC BLOOD PRESSURE < 130 MM HG: ICD-10-PCS | Mod: HCNC,CPTII,S$GLB, | Performed by: FAMILY MEDICINE

## 2019-09-25 PROCEDURE — 71100 XR RIBS 2 VIEW RIGHT: ICD-10-PCS | Mod: 26,HCNC,RT, | Performed by: RADIOLOGY

## 2019-09-25 PROCEDURE — 90662 IIV NO PRSV INCREASED AG IM: CPT | Mod: HCNC,S$GLB,, | Performed by: FAMILY MEDICINE

## 2019-09-25 PROCEDURE — 3078F DIAST BP <80 MM HG: CPT | Mod: HCNC,CPTII,S$GLB, | Performed by: FAMILY MEDICINE

## 2019-09-25 PROCEDURE — 99999 PR PBB SHADOW E&M-EST. PATIENT-LVL IV: CPT | Mod: PBBFAC,HCNC,, | Performed by: FAMILY MEDICINE

## 2019-09-25 PROCEDURE — 99214 PR OFFICE/OUTPT VISIT, EST, LEVL IV, 30-39 MIN: ICD-10-PCS | Mod: HCNC,25,S$GLB, | Performed by: FAMILY MEDICINE

## 2019-09-25 PROCEDURE — 3078F PR MOST RECENT DIASTOLIC BLOOD PRESSURE < 80 MM HG: ICD-10-PCS | Mod: HCNC,CPTII,S$GLB, | Performed by: FAMILY MEDICINE

## 2019-09-25 PROCEDURE — 1101F PR PT FALLS ASSESS DOC 0-1 FALLS W/OUT INJ PAST YR: ICD-10-PCS | Mod: HCNC,CPTII,S$GLB, | Performed by: FAMILY MEDICINE

## 2019-09-25 PROCEDURE — 3074F SYST BP LT 130 MM HG: CPT | Mod: HCNC,CPTII,S$GLB, | Performed by: FAMILY MEDICINE

## 2019-09-25 PROCEDURE — G0008 FLU VACCINE - HIGH DOSE (65+) PRESERVATIVE FREE IM: ICD-10-PCS | Mod: HCNC,S$GLB,, | Performed by: FAMILY MEDICINE

## 2019-09-25 PROCEDURE — 99214 OFFICE O/P EST MOD 30 MIN: CPT | Mod: HCNC,25,S$GLB, | Performed by: FAMILY MEDICINE

## 2019-09-25 PROCEDURE — G0008 ADMIN INFLUENZA VIRUS VAC: HCPCS | Mod: HCNC,S$GLB,, | Performed by: FAMILY MEDICINE

## 2019-09-25 PROCEDURE — 71100 X-RAY EXAM RIBS UNI 2 VIEWS: CPT | Mod: TC,HCNC,FY,PO,RT

## 2019-09-25 PROCEDURE — 71100 X-RAY EXAM RIBS UNI 2 VIEWS: CPT | Mod: 26,HCNC,RT, | Performed by: RADIOLOGY

## 2019-09-25 RX ORDER — AMLODIPINE BESYLATE 10 MG/1
10 TABLET ORAL DAILY
Qty: 90 TABLET | Refills: 1 | Status: SHIPPED | OUTPATIENT
Start: 2019-09-25 | End: 2020-04-24 | Stop reason: SDUPTHER

## 2019-09-25 NOTE — PROGRESS NOTES
Subjective:       Patient ID: Otis Rodriguez     Chief Complaint: Hyperlipidemia (follow up) and Hypertension (follow up)      HPIOtis Rodriguez is a 79 y.o. male. Reports recurrent falls.  Referred to clinic by  nurse.  States he has pain involving right anterior chest within past hour while in clinic.    Review of patient's allergies indicates:  No Known Allergies    Current Outpatient Medications:     atorvastatin (LIPITOR) 80 MG tablet, Take 40 mg by mouth once daily. , Disp: , Rfl:     buPROPion (WELLBUTRIN SR) 200 MG SR12, Take 1 tablet (200 mg total) by mouth 2 (two) times daily., Disp: 180 tablet, Rfl: 1    clopidogrel (PLAVIX) 75 mg tablet, Take 75 mg by mouth once daily. , Disp: , Rfl:     furosemide (LASIX) 40 MG tablet, Take 1 tablet (40 mg total) by mouth daily, Disp: , Rfl: 5    hydrALAZINE (APRESOLINE) 25 MG tablet, Take 25 mg by mouth 3 (three) times daily., Disp: , Rfl:     hydrOXYzine pamoate (VISTARIL) 25 MG Cap, TAKE 1 CAPSULE BY MOUTH EVERY 8 HOURS AS NEEDED, Disp: 30 capsule, Rfl: 0    levocetirizine (XYZAL) 5 MG tablet, Take 1 tablet (5 mg total) by mouth every evening., Disp: 90 tablet, Rfl: 3    lisinopril (PRINIVIL,ZESTRIL) 40 MG tablet, Take 40 mg by mouth once daily., Disp: , Rfl: 11    metoprolol succinate (TOPROL-XL) 50 MG 24 hr tablet, Take 1 tablet (50 mg total) by mouth once daily., Disp: 90 tablet, Rfl: 0    QUEtiapine (SEROQUEL) 100 MG Tab, Take 1 tablet (100 mg total) by mouth every evening., Disp: 90 tablet, Rfl: 3    ranolazine (RANEXA) 1,000 mg Tb12, Take 1,000 mg by mouth 2 (two) times daily. , Disp: , Rfl:     tamsulosin (FLOMAX) 0.4 mg Cap, Take 1 capsule (0.4 mg total) by mouth once daily., Disp: 90 capsule, Rfl: 3    amLODIPine (NORVASC) 10 MG tablet, Take 1 tablet (10 mg total) by mouth once daily., Disp: 90 tablet, Rfl: 1    aspirin (ECOTRIN) 325 MG EC tablet, TAKE 1 TABLET (325 MG TOTAL) BY MOUTH ONCE DAILY., Disp: 90 tablet, Rfl: 3    blood pressure  monitor Kit, , Disp: , Rfl:     dextran 70-hypromellose (TEARS) ophthalmic solution, Place 1 drop into both eyes every 6 (six) hours., Disp: 30 mL, Rfl: 0    diclofenac (VOLTAREN) 50 MG EC tablet, Take 50 mg by mouth 2 (two) times daily as needed., Disp: , Rfl: 0    erythromycin (ROMYCIN) ophthalmic ointment, Apply to eye., Disp: , Rfl:     fluticasone (FLONASE) 50 mcg/actuation nasal spray, 2 sprays (100 mcg total) by Each Nare route as needed for Rhinitis., Disp: 1 Bottle, Rfl: 11    Past Medical History:   Diagnosis Date    CAD (coronary artery disease) 1/12/2015    Cerebrovascular disease 1/12/2015    Depression     Essential hypertension, benign 1/12/2015    Other and unspecified hyperlipidemia 1/12/2015     Review of Systems  denies shortness of breath or chest pain  Objective:      Physical Exam   Pulmonary/Chest: He exhibits bony tenderness. He exhibits no deformity.           Assessment:       1. Need for influenza vaccination    2. Rib pain on right side        Plan:       Otis was seen today for hyperlipidemia and hypertension.    Diagnoses and all orders for this visit:    Need for influenza vaccination  -     Influenza - High Dose (65+) (PF) (IM)    Rib pain on right side  -   no evidence of fracture  Noncompliance with medical regimen    Otis was seen today for hyperlipidemia and hypertension.    Diagnoses and all orders for this visit:    Need for influenza vaccination  -     Influenza - High Dose (65+) (PF) (IM)    Rib pain on right side  -     X-Ray Ribs 2 View Right; Future    Uncontrolled hypertension  Patient not compliant with his medical regimen.  I was advised by patient's home health nurse that he has not taken his medication for past week.  She was instructed to hold amlodipine for now and to report if his blood pressures remain elevated.    Personal history of noncompliance with medical treatment and regimen             Name band;

## 2019-09-26 ENCOUNTER — TELEPHONE (OUTPATIENT)
Dept: FAMILY MEDICINE | Facility: CLINIC | Age: 79
End: 2019-09-26

## 2019-09-26 NOTE — TELEPHONE ENCOUNTER
HH nurse states she fixes his meds in med container every week; last week he was not compliant with taking medications but this week he has been more compliant;  HH nurse concerned because amlodipine was restarted, states he has orthostatic hypotension; wants to know if she can have range to hold amlodipine if bp greater then ??; also pt has OV with Dr Reynolds cardiologist tomorrow

## 2019-09-26 NOTE — TELEPHONE ENCOUNTER
----- Message from Amrita Carvajal sent at 9/26/2019 12:03 PM CDT -----  Contact: [facility] [phone]  Type:  Patient Returning Call    Who Called:Lilli [facility] Nurse    Who Left Message for Patient: Dr. Miles    Does the patient know what this is regarding?:office visit/question    Would the patient rather a call back or a response via [facility]? Call back    Best Call Back Number:[phone]

## 2019-09-26 NOTE — TELEPHONE ENCOUNTER
BP was elevated yesterday, therefore I suggested amlodipine be resumed.  If he has been non-compliant with his medication, I would hold off on restarting amlodipine for now. Please report BP readings on Monday.  BP medication should be held if BP < 90/60 or if patient symptomatic.  Notify clinic if BP > 150/90.

## 2019-09-27 ENCOUNTER — TELEPHONE (OUTPATIENT)
Dept: FAMILY MEDICINE | Facility: CLINIC | Age: 79
End: 2019-09-27

## 2019-09-27 NOTE — TELEPHONE ENCOUNTER
----- Message from Selma Miles MD sent at 9/26/2019  4:52 PM CDT -----  Inform patient he may have kidney stones in the right kidney.  No rib fractures seen.

## 2019-10-01 NOTE — TELEPHONE ENCOUNTER
Notified patient of information below. Verbalized understanding. Patient will go to [facility] due for pain.

## 2019-10-09 ENCOUNTER — OUTPATIENT CASE MANAGEMENT (OUTPATIENT)
Dept: ADMINISTRATIVE | Facility: OTHER | Age: 79
End: 2019-10-09

## 2019-10-09 NOTE — PROGRESS NOTES
Summary:Pt reports that he is doing well. Pt reports that he is laying down at the present time. Pt reports that reports is taking his medications. Pt reports that his blood pressure is averaging around 120. Pt reports that there are times when the blood pressure is below 100. Pt reports that he has pain in the left knee. Pt reports that he mentioned it to the doctor on his most recent office visit. Pt reports that he doesn't have an appetite at times but manages to consume 2-3 meals a day. Pt reports that he does drink ensure at times. Pt does not report any recent falls.   Next follow-up scheduled with pt::2 weeks  Pt agreed: yes       Interventions:Educate pt on things that puts him at risk for high blood pressure. Pt able to recall two risk factors at this time.    Plan:Educat pt on s/s of high blood pressure.     Todays OPCM Self-Management Care Plan was developed with the patients/caregivers input and was based on identified barriers from todays assessment.  Goals were written today with the patient/caregiver and the patient has agreed to work towards these goals to improve his/her overall well-being. Patient verbalized understanding of the care plan, goals, and all of today's instructions. Encouraged patient/caregiver to communicate with his/her physician and health care team about health conditions and the treatment plan.  Provided my contact information today and encouraged patient/caregiver to call me with any questions as needed.

## 2019-10-16 ENCOUNTER — TELEPHONE (OUTPATIENT)
Dept: FAMILY MEDICINE | Facility: CLINIC | Age: 79
End: 2019-10-16

## 2019-10-16 NOTE — TELEPHONE ENCOUNTER
----- Message from Tammy Gutierrez sent at 10/16/2019  2:17 PM CDT -----  Contact: Self   .Type: Patient Call Back    Who called : Arlen- [facility]     What is the request in detail: Arlen with [facility] called to notify the doctor they resumed patient's care after his hospital stay but the nurse who went out discovered bed bugs and asked the patient to treat it but it was not treated so they have to discharge him.     Can the clinic reply by [facility]? No     Would the patient rather a call back or a response via [facility]?  Call     Best call back number:[phone]

## 2019-10-28 ENCOUNTER — OUTPATIENT CASE MANAGEMENT (OUTPATIENT)
Dept: ADMINISTRATIVE | Facility: OTHER | Age: 79
End: 2019-10-28

## 2019-10-29 ENCOUNTER — TELEPHONE (OUTPATIENT)
Dept: HOME HEALTH SERVICES | Facility: HOSPITAL | Age: 79
End: 2019-10-29

## 2019-10-29 ENCOUNTER — EXTERNAL HOME HEALTH (OUTPATIENT)
Dept: HOME HEALTH SERVICES | Facility: HOSPITAL | Age: 79
End: 2019-10-29
Payer: MEDICARE

## 2019-10-29 NOTE — PROGRESS NOTES
Outpatient Care Management  Plan of Care Follow Up Visit    Patient: Otis Rodriguez  MRN: [id number]  Date of Service: 10/29/2019  Completed by: Chantel Russ RN  Referral Date: 08/17/2019  Program: Case Management (High Risk)    Reason for Visit   Patient presents with    OPCM RN First Follow-Up Attempt       Patient Summary     Involvement of Care:  Do I have permission to speak with other family members about your care?       Problem List     Patient Active Problem List   Diagnosis    Other and unspecified hyperlipidemia    Cerebrovascular disease    Essential hypertension, benign    CAD (coronary artery disease)    Major depressive disorder, recurrent, severe without psychotic features    Aortic atherosclerosis    Substance abuse       Reviewed Active Problem List with patient and/or Caregiver.    Patient Reported Labs & Vitals:  1.  Any Patient Reported Labs & Vitals?     2.  Patient Reported Blood Pressure:     3.  Patient Reported Pulse:     4.  Patient Reported Weight (Kg):     5.  Patient Reported Blood Glucose (mg/dl):       Medical History:  Reviewed medical history with patient and/or caregiver    Social History:  Reviewed social history with patient and/or caregiver    Clinical Assessment     Reviewed and provided basic information on available community resources for mental health, transportation, wellness resources, and palliative care programs with patient and/or caregiver.    Complex Care Plan     Care plan was discussed and completed today with input from patient and/or caregiver.    Goals      Patient/caregiver will have an action plan in place to manage and prevent complications of high blood pressure prior to discharge from [facility]. - Priority: High      Overall Time to Completion  2 months from 09/09/2019     OP[facility] Identified Patient Barriers:  Health Literacy: Care plan created   Fall Risk: Care plan created      RN Identified Patient Barriers:      Short Term Goals       Patient/caregiver will verbalize 2 risk factors of Hypertension within 3 weeks.  Interventions   · Assess for availability of working blood pressure cuff in home setting.  · Collaborate with Physician as appropriate to meet patient needs.  · Encourage Dietary Compliance.  · Encourage Exercise.  · Encourage Medication Compliance.  · Facilitate medical appointments.  · Recognize and provide educational material (KRAMES).  ·      Status  Met    Patient/caregiver will verbalize 2 signs and symptoms of Hypertension within 3 weeks.   Interventions   · Assess for availability of working blood pressure cuff in home setting.  · Collaborate with Physician as appropriate to meet patient needs.  · Recognize and provide educational material (KRAMES).  · .     Status  · Met      Patient/caregiver will verbalize 2 ways of preventing complications due to disease process within 3 weeks.  Interventions   · Assess for availability of working blood pressure cuff in home setting.  · Collaborate with Physician as appropriate to meet patient needs.  · Empower patient/caregiver to discuss treatment plan with Physician/care team.  · Encourage Dietary Compliance.  · Encourage Exercise.  · Encourage Medication Compliance.  · Recognize and provide educational material (KRAMES).  · Review eating/nutrition habits.     Status  · Partially met           Clinical Reference Documents Added to Patient Instructions       Document    FALL DUE TO DIZZINESS, WEAKNESS, OR LOSS OF BALANCE (ENGLISH)    FALLS, PREVENTING, ARE YOU AT RISK OF FALLING? (ENGLISH)    FALLS, PREVENTING, HOW TO PREPARE AND WHAT TO DO (ENGLISH)             Patient/caregiver will have Safety Plan in place prior to discharge from \[facility]\"". - Priority: High      Overall Time to Completion  2 months from 09/09/2019      \[facility]\"" Identified Patient Barriers:  Health Literacy: Care plan created  Fall Risk: Care plan created      RN Identified Patient Barriers:      Short Term  Goals      Patient/caregiver will identify 2 supports or services to maintain or improve current functional status within 3 weeks.  Interventions   · Assess patient's ability to perform ADLs.  · Collaborate with Physician as appropriate to meet patient needs.  · Empower patient/caregiver to discuss treatment plan with Physician/care team.  · Encourage compliance with Physician follow-ups.  · Encourage family/caregiver to help patient perform ADLs according to the patient's capabilities.  · Recognize and provide educational material (KATIE).     Status  · Partially met      Patient/caregiver will verbalize importance of having a safe home environment by keeping room free of clutter, making sure no electrical cords placed in walk ways, making sure rooms are well lit, placing non-skid bath mats and removing throw rugs within 2 weeks.  Interventions   · Assess patient's ability to perform ADLs.  · Collaborate with Physician as appropriate to meet patient needs.  · Empower patient/caregiver to discuss treatment plan with Physician/care team.  · Encourage compliance with Physician follow-ups.  · Recognize and provide educational material (KATIE).     Status  · Partially met                Patient Instructions     Instructions were provided via the [employer] patient resources and are available for the patient to view on the patient portal.    No follow-ups on file.   Summary: Pt reports that he has pain to his right knee that started a couple of weeks ago. Pt reports that he does not take any medication to relieve the pain. Pt reports that the pain usually goes away without any interventions. Pt reports that the knee is not swollen. Pt reports that he ambulates with a walker. Pt reports that he fell on yesterday. Pt reports that he went to [facility] due to the fall. Pt reports that nothing was found on the exam. Pt reports that he no longer receives PT per home health. Pt reports that he gets dizzy at times  when he stands. Pt reports that he does not have anyone that checks on him everyday. Pt reports that his daughter does not have transportation.   F/U in two weeks  Pt agrees    Interventions:Educated pt on the s/s of HTN                        Educated pt on changing positions slowly d/t HTN      Plan:Educate pt on safety measures           Educate pt on the prevention of complications of HTN        Todays OPCM Self-Management Care Plan was developed with the patients/caregivers input and was based on identified barriers from todays assessment.  Goals were written today with the patient/caregiver and the patient has agreed to work towards these goals to improve his/her overall well-being. Patient verbalized understanding of the care plan, goals, and all of today's instructions. Encouraged patient/caregiver to communicate with his/her physician and health care team about health conditions and the treatment plan.  Provided my contact information today and encouraged patient/caregiver to call me with any questions as needed.

## 2019-11-21 ENCOUNTER — OUTPATIENT CASE MANAGEMENT (OUTPATIENT)
Dept: ADMINISTRATIVE | Facility: OTHER | Age: 79
End: 2019-11-21

## 2019-11-21 NOTE — PATIENT INSTRUCTIONS
Bedbugs    After years of being very rare in the , bedbugs are making a comeback. These bugs are small, about the size of an apple seed. They are reddish-brown, oval, and look slightly flattened. Bedbugs feed on human and animal blood, usually at night during sleep. Bedbugs are a nuisance. But they are not a major threat to your health.  Facts about bedbugs  · Bedbugs are active mainly at night. During the day, they hide in dark places, often in and around where people or animals sleep. They are commonly found on mattresses and boxsprings and behind headboards. But they can hide anywhere.  · Bedbugs are small and hard to see. They are often carried from place to place in items like luggage, furniture, and clothing. This is why they spread so easily.  · Bedbugs are not attracted to dirt. Even the cleanest house or hotel can have bedbugs.  · Unlike mosquitoes, bedbugs do not transmit disease. If you are bitten, you do not have to worry about catching a blood-borne illness.  · Insect repellents have little effect on bedbugs.  · Adult bedbugs can live for several months without a blood feeding.  · Bedbugs are very hard to get rid of. If an infestation is suspected, it is recommended that a professional  be called.  Signs of bedbugs  Bites can be the first sign of a bedbug infestation. When inspecting for the bugs, look in crevices of mattresses and box springs, behind the headboard, and in and on objects near or under the bed. You may see the bugs themselves. Or, you may see tiny dark stains on fabric or carpets. Smears of blood on sheets and nightclothes upon awakening are another sign. In some cases, the bugs are so well hidden they cant be found unless items are taken apart.  Bedbug bites  Bedbugs look for food at night. They bite while people or animals are sleeping. The bites are most often painless. Many people never know theyve been bitten. But some people develop an itchy red welt or swelling.  And if a person has an allergic reaction, severe itching, blisters, or hives can develop. Bites are often on areas that are exposed, such as the head, neck, arms, and hands. Bedbug bites are not dangerous and dont spread illness. But if the bite is scratched and the skin is broken and irritated, there is a chance that a skin infection can develop.  Treating bites  Bite symptoms usually go away on their own within a week or two. During this time, over-the-counter (OTC) hydrocortisone ointment or cream can help relieve itching and swelling. If itching is bad, an OTC antihistamine thats taken by mouth (oral) can help. If an infection develops from scratching the bites, your healthcare provider can prescribe an antibiotic.  If you were bitten by bedbugs in your home, talk to a licensed pest-control professional or company. They can inspect your home and help you get rid of the bugs safely.  When to call your healthcare provider   If you have bites, call your healthcare provider if you develop any of the following:  · A fever of 100.4°F (38°C) or higher, or as directed by your provider  · Signs of infection of the bites, such as increased swelling and pain, warmth, or oozing  · Signs of allergic reaction, such as hives, spreading rash, throat itching or swelling, or wheezing   Avoiding bedbugs  · Avoid buying used beds. But if you do buy used bed frames, mattresses, box springs, or other furniture, check them carefully for bedbugs before bringing them into your home.  · If bedbugs are found or suspected in the bed, use mattress and box spring encasement covers that can seal in bedbugs so they will eventually die there.  · When traveling, remove linens from the top of the bed and check the mattress and headboard for signs of the bugs. Place luggage on a hard surface such as a table or on a luggage rack and not on the floor.  · If you think you were exposed to bedbugs while traveling, wash all clothing in hot water as  soon as you get home. Washing alone will not kill the bugs. Clothing must be put in a dryer at high temperatures, at least 113° F (45° C) for 1 hour.   · Never  items discarded on the street for use in your home. These include bed frames, mattresses, box springs, or upholstered furniture. These items may carry bedbugs.     Date Last Reviewed: 3/1/2017  © 7630-8857 [employer]. [address] [place] [address]. All rights reserved. This information is not intended as a substitute for professional medical care. Always follow your healthcare professional's instructions.        Bedbug Bites  Bedbugs are tiny insects, about the size of an apple seed. They are reddish-brown and slightly flattened and oval. They feed on human blood, usually at night while people are sleeping. Bedbugs are attracted to the warmth of your body, and also to your breath. Unlike some other parasites, they can live up to a year without eating. They dont have wings and dont jump, but they are fast crawlers.  Bedbugs are not dangerous and dont usually spread disease.  Bite symptoms  The symptoms of bedbug bites can be different for different people. Bites can be found anywhere on your body, but they are more common on skin that is exposed. Look for these symptoms:  · Itching  · Red rash, which can start small and get larger  · Hives, red swollen marks (welts), or raised red itchy areas (wheals). These may be in spots or cover a large area.  · Small, firm, flat bumps  · Blisters  · Cluster of bites in a line, or in a curved or zigzag row  · Allergic reaction  · Skin infection from scratching the bites  Where bedbugs hide out  Bedbugs can be found in almost any place you spend time, both at home and away from home. This includes hotels, buses, trains, ships, nursing homes, and apartments.  Bedbugs can be in clean or dirty places. Because of their size and shape, bedbugs can get into small places, where you wouldnt  expect to look. They tend to be found mostly in furniture, furnishings around the home, clothing, and cracks and crevices. Here are specific areas where they can be found:  · Beds and mattresses, especially in the seams  · Joints of bed frames, or the headboard  · Sheets and blankets  · Couches, fabric-covered chairs, and other furniture with fabric  · Rugs, especially along the edges  · Luggage or boxes  · Clothing  · Behind wall decorations, pictures, mirrors, and smoke alarms, and in electric outlets  How to find them  Bedbugs are big enough to be seen. But they also may leave some traces:  · Black spots (feces) on a bed mattress, especially around the seams  · Blood spots on the sheets  · Shells they may have shed  Home care  · Bite symptoms usually go away on their own in 1 to 2 weeks.  · To help prevent infection, avoid scratching the bites as much as possible.  · To relieve itching and swelling, use an over-the-counter (OTC) hydrocortisone ointment or cream  · If you need more relief, put an ice pack on the bites. Use the ice pack for up to 20 minutes at a time. To make an ice pack, put ice cubes in a plastic bag that seals at the top. Wrap the bag in a clean, thin towel or cloth. Never put ice or an ice pack directly on the skin.  · If you have a large number of bites or severe itching, take an OTC oral antihistamine. Follow the directions on the package.  · If a bite becomes infected, your health care provider can prescribe an antibiotic. This may be a pill you take by mouth. Or it may be a cream you put on your skin.  · If you were bitten in your home, talk with a licensed pest-control company. Bedbugs do not live on you. They live in cracks in your house. The company can inspect your home and help you get rid of the bugs safely.  Follow-up care  Follow up with your healthcare provider, or as advised.  When to seek medical advice  Call your healthcare provider right away if any of these occur:  · Fever of  100.4°F (38.0°C), or higher  · Signs of infection in the bites, such as swelling and pain that gets worse, warmth in the area, or drainage from the bites  · Signs of allergic reaction, such as hives or a spreading rash  Call 911  Call 911 if any of the following occur:  · Throat itching or swelling  · Wheezing  Date Last Reviewed: 8/1/2016  © 7425-5530 [employer]. [address] [place] [address], [place] [address]. All rights reserved. This information is not intended as a substitute for professional medical care. Always follow your healthcare professional's instructions.

## 2019-11-21 NOTE — PROGRESS NOTES
Outpatient Care Management  Plan of Care Follow Up Visit    Patient: Otis Rodriguez  MRN: [id number]  Date of Service: 11/21/2019  Completed by: Chantel Russ RN  Referral Date: 08/17/2019  Program: Case Management (High Risk)    Reason for Visit   Patient presents with    Update Plan Of Care       Brief Summary: Pt reports that he is doing fine. Pt reports that he has a hard time with falling asleep at night. Pt reports that this is not a new problem.   Informed pt that this cm home health documentation with referance to pt having bed bugs in his home. Pt reports that he does have bedbugs. Pt reports that he plans to get rid of his current sofa and to get a new sofa. Educated pt that all of his clothes should be bagged into plastic bags until they can be washed in the hottest temperature.  Educated pt to make sure that the whole home is free of bedbugs before bringing in a new sofa.Pt does not report any bites from the bedbugs.Educated pt on the prevention of complications of HTN.    Patient Summary     Involvement of Care:  Do I have permission to speak with other family members about your care?       Problem List     Patient Active Problem List   Diagnosis    Other and unspecified hyperlipidemia    Cerebrovascular disease    Essential hypertension, benign    CAD (coronary artery disease)    Major depressive disorder, recurrent, severe without psychotic features    Aortic atherosclerosis    Substance abuse       Reviewed Active Problem List with patient and/or Caregiver.    Patient Reported Labs & Vitals:  1.  Any Patient Reported Labs & Vitals?     2.  Patient Reported Blood Pressure:     3.  Patient Reported Pulse:     4.  Patient Reported Weight (Kg):     5.  Patient Reported Blood Glucose (mg/dl):       Medical History:  Reviewed medical history with patient and/or caregiver    Social History:  Reviewed social history with patient and/or caregiver    Clinical Assessment     Reviewed and provided  basic information on available community resources for mental health, transportation, wellness resources, and palliative care programs with patient and/or caregiver.    Complex Care Plan     Care plan was discussed and completed today with input from patient and/or caregiver.    Goals      Patient/caregiver will have an action plan in place to manage and prevent complications of bedbugs prior to discharge from [facility]. - Priority: High      Overall Time to Completion  2 months from 11/21/2019     [facility] Identified Patient Barriers:             [facility] Identified Disease Education Barriers:       Short Term Goals  Patient/caregiver will verbalize 2 strategies to maximize safety/get rid of bedbugs  within 1 month.  Interventions   · Assess patient's ability to perform ADLs.  · Discuss appropriate use of Home health with patient/caregiver.  · Facilitate Home Health Services.  · Recognize and provide educational material (KATIE).  · Refer to Outpatient Case Management Social Worker.     Patient/Caregiver agrees to start the cleaning process by  2weeks.  Patient/Caregiver agrees to OPCM follow up within 2 weeks to assess progress to goal.      Status  · Partially met                 Patient/caregiver will have an action plan in place to manage and prevent complications of high blood pressure prior to discharge from [facility]. - Priority: High      Overall Time to Completion  2 months from 09/09/2019     [facility] Identified Patient Barriers:  Health Literacy: Care plan created   Fall Risk: Care plan created      RN Identified Patient Barriers:      Short Term Goals      Patient/caregiver will verbalize 2 risk factors of Hypertension within 3 weeks.  Interventions   · Assess for availability of working blood pressure cuff in home setting.  · Collaborate with Physician as appropriate to meet patient needs.  · Encourage Dietary Compliance.  · Encourage Exercise.  · Encourage Medication Compliance.  · Facilitate medical  appointments.  · Recognize and provide educational material (KATIE).  ·      Status  Met    Patient/caregiver will verbalize 2 signs and symptoms of Hypertension within 3 weeks.   Interventions   · Assess for availability of working blood pressure cuff in home setting.  · Collaborate with Physician as appropriate to meet patient needs.  · Recognize and provide educational material (ZUNILDAMES).  · .     Status  · Met      Patient/caregiver will verbalize 2 ways of preventing complications due to disease process within 3 weeks.  Interventions   · Assess for availability of working blood pressure cuff in home setting.  · Collaborate with Physician as appropriate to meet patient needs.  · Empower patient/caregiver to discuss treatment plan with Physician/care team.  · Encourage Dietary Compliance.  · Encourage Exercise.  · Encourage Medication Compliance.  · Recognize and provide educational material (KATIE).  · Review eating/nutrition habits.     Status  · Met 11/21/19           Clinical Reference Documents Added to Patient Instructions       Document    FALL DUE TO DIZZINESS, WEAKNESS, OR LOSS OF BALANCE (ENGLISH)    FALLS, PREVENTING, ARE YOU AT RISK OF FALLING? (ENGLISH)    FALLS, PREVENTING, HOW TO PREPARE AND WHAT TO DO (ENGLISH)             Patient/caregiver will have Safety Plan in place prior to discharge from [facility]. - Priority: High      Overall Time to Completion  2 months from 09/09/2019      [facility] Identified Patient Barriers:  Health Literacy: Care plan created  Fall Risk: Care plan created      RN Identified Patient Barriers:      Short Term Goals      Patient/caregiver will identify 2 supports or services to maintain or improve current functional status within 3 weeks.  Interventions   · Assess patient's ability to perform ADLs.  · Collaborate with Physician as appropriate to meet patient needs.  · Empower patient/caregiver to discuss treatment plan with Physician/care team.  · Encourage compliance with  Physician follow-ups.  · Encourage family/caregiver to help patient perform ADLs according to the patient's capabilities.  · Recognize and provide educational material (KATIE).     Status  · Partially met      Patient/caregiver will verbalize importance of having a safe home environment by keeping room free of clutter, making sure no electrical cords placed in walk ways, making sure rooms are well lit, placing non-skid bath mats and removing throw rugs within 2 weeks.  Interventions   · Assess patient's ability to perform ADLs.  · Collaborate with Physician as appropriate to meet patient needs.  · Empower patient/caregiver to discuss treatment plan with Physician/care team.  · Encourage compliance with Physician follow-ups.  · Recognize and provide educational material (KATIE).     Status  · Partially met                Patient Instructions     Instructions were provided via the [employer] patient resources and are available for the patient to view on the patient portal.    Next Steps:Consult with LMSW before refing to the case.                     F/u of resolving of bedbugs.                                                No follow-ups on file.      Todays O[facility] Self-Management Care Plan was developed with the patients/caregivers input and was based on identified barriers from todays assessment.  Goals were written today with the patient/caregiver and the patient has agreed to work towards these goals to improve his/her overall well-being. Patient verbalized understanding of the care plan, goals, and all of today's instructions. Encouraged patient/caregiver to communicate with his/her physician and health care team about health conditions and the treatment plan.  Provided my contact information today and encouraged patient/caregiver to call me with any questions as needed.

## 2019-12-09 ENCOUNTER — OUTPATIENT CASE MANAGEMENT (OUTPATIENT)
Dept: ADMINISTRATIVE | Facility: OTHER | Age: 79
End: 2019-12-09

## 2019-12-12 ENCOUNTER — OFFICE VISIT (OUTPATIENT)
Dept: FAMILY MEDICINE | Facility: CLINIC | Age: 79
End: 2019-12-12
Payer: MEDICARE

## 2019-12-12 VITALS
RESPIRATION RATE: 17 BRPM | TEMPERATURE: 97 F | DIASTOLIC BLOOD PRESSURE: 58 MMHG | OXYGEN SATURATION: 99 % | BODY MASS INDEX: 18.2 KG/M2 | HEIGHT: 67 IN | HEART RATE: 60 BPM | WEIGHT: 115.94 LBS | SYSTOLIC BLOOD PRESSURE: 116 MMHG

## 2019-12-12 DIAGNOSIS — I10 HYPERTENSION, ESSENTIAL, BENIGN: Primary | ICD-10-CM

## 2019-12-12 DIAGNOSIS — M79.605 LOW BACK PAIN RADIATING TO LEFT LEG: ICD-10-CM

## 2019-12-12 DIAGNOSIS — M54.50 LOW BACK PAIN RADIATING TO LEFT LEG: ICD-10-CM

## 2019-12-12 PROCEDURE — 99999 PR PBB SHADOW E&M-EST. PATIENT-LVL IV: ICD-10-PCS | Mod: PBBFAC,HCNC,, | Performed by: PHYSICIAN ASSISTANT

## 2019-12-12 PROCEDURE — 99214 PR OFFICE/OUTPT VISIT, EST, LEVL IV, 30-39 MIN: ICD-10-PCS | Mod: HCNC,S$GLB,, | Performed by: PHYSICIAN ASSISTANT

## 2019-12-12 PROCEDURE — 99214 OFFICE O/P EST MOD 30 MIN: CPT | Mod: HCNC,S$GLB,, | Performed by: PHYSICIAN ASSISTANT

## 2019-12-12 PROCEDURE — 99999 PR PBB SHADOW E&M-EST. PATIENT-LVL IV: CPT | Mod: PBBFAC,HCNC,, | Performed by: PHYSICIAN ASSISTANT

## 2019-12-12 RX ORDER — HYDROXYZINE HYDROCHLORIDE 25 MG/1
25 TABLET, FILM COATED ORAL
COMMUNITY
End: 2019-12-12

## 2019-12-12 RX ORDER — ATORVASTATIN CALCIUM 80 MG/1
80 TABLET, FILM COATED ORAL DAILY
Qty: 90 TABLET | Refills: 0 | Status: SHIPPED | OUTPATIENT
Start: 2019-12-12 | End: 2020-12-11

## 2019-12-12 RX ORDER — GABAPENTIN 100 MG/1
100 CAPSULE ORAL 3 TIMES DAILY PRN
Qty: 90 CAPSULE | Refills: 0 | Status: SHIPPED | OUTPATIENT
Start: 2019-12-12 | End: 2020-12-11

## 2019-12-12 NOTE — PROGRESS NOTES
"Subjective:       Patient ID: Otis Rodriguez is a 79 y.o. male.    Chief Complaint: Hypertension; Hyperlipidemia; and Insomnia    HPI:  79-year-old male presents for hypertension followup and low back pain. Blood pressure doing much better since patient is consistently taking his medication.  He is asymptomatic.  Patient complains of intermittent low back pain radiating down his left leg.  He sometimes feels weak in his left leg.  He states he sometimes has trouble standing up as the pain is so severe.  He also complains of numbness and tingling.   Social History     Socioeconomic History    Marital status:      Spouse name: Not on file    Number of children: Not on file    Years of education: Not on file    Highest education level: Not on file   Occupational History    Not on file   Social Needs    Financial resource strain: Not on file    Food insecurity:     Worry: Not on file     Inability: Not on file    Transportation needs:     Medical: Not on file     Non-medical: Not on file   Tobacco Use    Smoking status: Current Some Day Smoker     Packs/day: 0.50     Types: Cigarettes     Last attempt to quit: 2017     Years since quittin.8    Smokeless tobacco: Never Used    Tobacco comment: daily marijuana, history of crack cocaine abuse 2010   Substance and Sexual Activity    Alcohol use: Yes     Alcohol/week: 0.0 standard drinks     Frequency: 2-4 times a month     Comment: History of heavy ETOH in past; quit 2 yrs ago    Drug use: Yes     Types: Marijuana, "Crack" cocaine    Sexual activity: Not Currently     Partners: Female     Comment: [id number]   Lifestyle    Physical activity:     Days per week: Not on file     Minutes per session: Not on file    Stress: Only a little   Relationships    Social connections:     Talks on phone: Not on file     Gets together: Not on file     Attends Congregational service: Not on file     Active member of club or organization: Not on file     " Attends meetings of clubs or organizations: Not on file     Relationship status: Not on file   Other Topics Concern    Not on file   Social History Narrative    Not on file       Review of Systems    Objective:      Physical Exam   Constitutional: He is oriented to person, place, and time. He appears well-developed and well-nourished. No distress.   HENT:   Head: Normocephalic and atraumatic.   Musculoskeletal: He exhibits tenderness.        Left hip: He exhibits decreased range of motion, decreased strength and tenderness. He exhibits no bony tenderness.   Neurological: He is alert and oriented to person, place, and time.   Skin: He is not diaphoretic.   Vitals reviewed.      Assessment:       1. Hypertension, essential, benign    2. Low back pain radiating to left leg        Plan:         Otis was seen today for hypertension, hyperlipidemia and insomnia.    Diagnoses and all orders for this visit:    Hypertension, essential, benign  -   Controlled on meds    Low back pain radiating to left leg  -     gabapentin (NEURONTIN) 100 MG capsule; Take 1 capsule (100 mg total) by mouth 3 (three) times daily as needed (leg pain).  -     Advised pt can cause drowsiness. Advised if pt worse at night can try taking two    Other orders  -     atorvastatin (LIPITOR) 80 MG tablet; Take 1 tablet (80 mg total) by mouth once daily.

## 2019-12-17 ENCOUNTER — TELEPHONE (OUTPATIENT)
Dept: FAMILY MEDICINE | Facility: CLINIC | Age: 79
End: 2019-12-17

## 2019-12-17 NOTE — TELEPHONE ENCOUNTER
Patient wanted to inform Dr. Miles that he is admitted to [facility] for headaches.  Please be advised.

## 2019-12-17 NOTE — TELEPHONE ENCOUNTER
----- Message from Aylin Reynolds sent at 12/17/2019  8:54 AM CST -----  Contact: Self   Type: Patient Call Back    What is the request in detail: Pt calling to let  know he is at [facility].     Can the clinic reply by [facility]? No    Would the patient rather a call back or a response via [facility]? Call back     Best call back number: [phone]

## 2019-12-19 ENCOUNTER — OUTPATIENT CASE MANAGEMENT (OUTPATIENT)
Dept: ADMINISTRATIVE | Facility: OTHER | Age: 79
End: 2019-12-19

## 2019-12-23 DIAGNOSIS — R35.0 URINARY FREQUENCY: ICD-10-CM

## 2019-12-23 RX ORDER — TAMSULOSIN HYDROCHLORIDE 0.4 MG/1
0.4 CAPSULE ORAL DAILY
Qty: 90 CAPSULE | Refills: 3 | Status: SHIPPED | OUTPATIENT
Start: 2019-12-23 | End: 2020-11-19 | Stop reason: SDUPTHER

## 2019-12-23 NOTE — TELEPHONE ENCOUNTER
Last Office Visit Info:   The patient's last visit with Selma Miles MD was on 9/25/2019.    The patient's last visit in current department was on 12/12/2019.        Last CBC Results:   Lab Results   Component Value Date    WBC 6.88 08/04/2019    HGB 11.7 (L) 08/04/2019    HCT 36.7 (L) 08/04/2019     08/04/2019       Last CMP Results  Lab Results   Component Value Date     08/04/2019    K 4.3 08/04/2019     08/04/2019    CO2 25 08/04/2019    BUN 16 08/04/2019    CREATININE 1.5 (H) 08/04/2019    CALCIUM 9.4 08/04/2019    ALBUMIN 4.3 08/04/2019    AST 32 08/04/2019    ALT 22 08/04/2019       Last Lipids  Lab Results   Component Value Date    CHOL 157 05/14/2018    TRIG 72 05/14/2018    HDL 56 05/14/2018    LDLCALC 86.6 05/14/2018       Last A1C  No results found for: HGBA1C    Last TSH  No results found for: TSH      Current Med Refills  Medication List with Changes/Refills   Current Medications    AMLODIPINE (NORVASC) 10 MG TABLET    Take 1 tablet (10 mg total) by mouth once daily.       Start Date: 9/25/2019 End Date: 9/24/2020    ASPIRIN (ECOTRIN) 325 MG EC TABLET    TAKE 1 TABLET (325 MG TOTAL) BY MOUTH ONCE DAILY.       Start Date: 12/15/2017End Date: 3/27/2019    ATORVASTATIN (LIPITOR) 80 MG TABLET    Take 1 tablet (80 mg total) by mouth once daily.       Start Date: 12/12/2019End Date: 12/11/2020    BLOOD PRESSURE MONITOR KIT           Start Date: 8/19/2019 End Date: --    BUPROPION (WELLBUTRIN SR) 200 MG SR12    Take 1 tablet (200 mg total) by mouth 2 (two) times daily.       Start Date: 4/4/2019  End Date: 12/12/2019    CLOPIDOGREL (PLAVIX) 75 MG TABLET    Take 75 mg by mouth once daily.        Start Date: 12/11/2018End Date: --    DEXTRAN 70-HYPROMELLOSE (TEARS) OPHTHALMIC SOLUTION    Place 1 drop into both eyes every 6 (six) hours.       Start Date: 8/4/2019  End Date: --    FLUTICASONE (FLONASE) 50 MCG/ACTUATION NASAL SPRAY    2 sprays (100 mcg total) by Each Nare route as needed  for Rhinitis.       Start Date: 4/4/2019  End Date: --    FUROSEMIDE (LASIX) 40 MG TABLET    Take 1 tablet (40 mg total) by mouth daily       Start Date: 7/23/2019 End Date: --    GABAPENTIN (NEURONTIN) 100 MG CAPSULE    Take 1 capsule (100 mg total) by mouth 3 (three) times daily as needed (leg pain).       Start Date: 12/12/2019End Date: 12/11/2020    HYDRALAZINE (APRESOLINE) 25 MG TABLET    Take 25 mg by mouth 3 (three) times daily.       Start Date: --        End Date: --    LEVOCETIRIZINE (XYZAL) 5 MG TABLET    Take 1 tablet (5 mg total) by mouth every evening.       Start Date: 9/26/2018 End Date: 12/12/2019    LISINOPRIL (PRINIVIL,ZESTRIL) 40 MG TABLET    Take 40 mg by mouth once daily.       Start Date: 7/9/2019  End Date: --    METOPROLOL SUCCINATE (TOPROL-XL) 50 MG 24 HR TABLET    Take 1 tablet (50 mg total) by mouth once daily.       Start Date: 2/26/2019 End Date: --    QUETIAPINE (SEROQUEL) 100 MG TAB    Take 1 tablet (100 mg total) by mouth every evening.       Start Date: 4/4/2019  End Date: 4/3/2020    RANOLAZINE (RANEXA) 1,000 MG TB12    Take 1,000 mg by mouth 2 (two) times daily.        Start Date: 7/9/2019  End Date: 7/8/2020    TAMSULOSIN (FLOMAX) 0.4 MG CAP    Take 1 capsule (0.4 mg total) by mouth once daily.       Start Date: 4/4/2019  End Date: --       Order(s) placed per written order guidelines:     Please advise.

## 2020-01-03 ENCOUNTER — OUTPATIENT CASE MANAGEMENT (OUTPATIENT)
Dept: ADMINISTRATIVE | Facility: OTHER | Age: 80
End: 2020-01-03

## 2020-01-03 NOTE — PROGRESS NOTES
Outpatient Care Management  Plan of Care Follow Up Visit    Patient: Otis Rodriguez  MRN: [id number]  Date of Service: 01/03/2020  Completed by: Chantel Russ RN  Referral Date: 08/17/2019  Program: Case Management (High Risk)    No chief complaint on file.      Brief Summary: Pt reports that he is in pain due to his legs. Pt reports that the pain in ongoing and chronic. Pt reports that he continues to soak in a tub of warm water to decrease the pain. Pt reports that he has been free of falls. Pt reports that he was recently admitted into the hospital for complaint of headache. Pt reports that the headache is not a new problem.  Pt reports that he has had no bites of problems from the bedbugs in the sofa. Educated pt to put his clothes into a garbage bag and tie the bad tightly. Pt reports that he will get a new sofa.Educated pt the the apartment has to be free of bedbugs before bringing in a new sofa. Pt verbalized understanding.    Patient Summary     Involvement of Care:  Do I have permission to speak with other family members about your care?       Patient Reported Labs & Vitals:  1.  Any Patient Reported Labs & Vitals?     2.  Patient Reported Blood Pressure:     3.  Patient Reported Pulse:     4.  Patient Reported Weight (Kg):     5.  Patient Reported Blood Glucose (mg/dl):       Medical History:  Reviewed medical history with patient and/or caregiver    Social History:  Reviewed social history with patient and/or caregiver    Clinical Assessment     Reviewed and provided basic information on available community resources for mental health, transportation, wellness resources, and palliative care programs with patient and/or caregiver.    Complex Care Plan     Care plan was discussed and completed today with input from patient and/or caregiver.    Goals      Patient/caregiver will have an action plan in place to manage and prevent complications of bedbugs prior to discharge from [facility]. - Priority: High       Overall Time to Completion  2 months from 11/21/2019     OPCM Identified Patient Barriers:             OPCM Identified Disease Education Barriers:       Short Term Goals  Patient/caregiver will verbalize 2 strategies to maximize safety/get rid of bedbugs  within 1 month.  Interventions   · Assess patient's ability to perform ADLs.  · Discuss appropriate use of Home health with patient/caregiver.  · Facilitate Home Health Services. ( Reports that he was with [facility])  · Recognize and provide educational material (KATIE).  · Refer to Outpatient Case Management Social Worker.     Patient/Caregiver agrees to start the cleaning process by  2weeks.  Patient/Caregiver agrees to OPC follow up within 2 weeks to assess progress to goal.      Status  Partially met 1/3/2020               Patient/caregiver will have an action plan in place to manage and prevent complications of high blood pressure prior to discharge from [facility]. - Priority: High      Overall Time to Completion  2 months from 09/09/2019     OPCM Identified Patient Barriers:  Health Literacy: Care plan created   Fall Risk: Care plan created      RN Identified Patient Barriers:      Short Term Goals      Patient/caregiver will verbalize 2 risk factors of Hypertension within 3 weeks.  Interventions   · Assess for availability of working blood pressure cuff in home setting.  · Collaborate with Physician as appropriate to meet patient needs.  · Encourage Dietary Compliance.  · Encourage Exercise.  · Encourage Medication Compliance.  · Facilitate medical appointments.  · Recognize and provide educational material (KATIE).  ·      Status  Met    Patient/caregiver will verbalize 2 signs and symptoms of Hypertension within 3 weeks.   Interventions   · Assess for availability of working blood pressure cuff in home setting.  · Collaborate with Physician as appropriate to meet patient needs.  · Recognize and provide educational material  (KATIE).  · .     Status  · Met      Patient/caregiver will verbalize 2 ways of preventing complications due to disease process within 3 weeks.  Interventions   · Assess for availability of working blood pressure cuff in home setting.  · Collaborate with Physician as appropriate to meet patient needs.  · Empower patient/caregiver to discuss treatment plan with Physician/care team.  · Encourage Dietary Compliance.  · Encourage Exercise.  · Encourage Medication Compliance.  · Recognize and provide educational material (KATIE).  · Review eating/nutrition habits.     Status  · Met 11/21/19           Clinical Reference Documents Added to Patient Instructions       Document    FALL DUE TO DIZZINESS, WEAKNESS, OR LOSS OF BALANCE (ENGLISH)    FALLS, PREVENTING, ARE YOU AT RISK OF FALLING? (ENGLISH)    FALLS, PREVENTING, HOW TO PREPARE AND WHAT TO DO (ENGLISH)             Patient/caregiver will have Safety Plan in place prior to discharge from [facility]. - Priority: High      Overall Time to Completion  2 months from 09/09/2019      [facility] Identified Patient Barriers:  Health Literacy: Care plan created  Fall Risk: Care plan created      RN Identified Patient Barriers:      Short Term Goals      Patient/caregiver will identify 2 supports or services to maintain or improve current functional status within 3 weeks.  Interventions   · Assess patient's ability to perform ADLs.  · Collaborate with Physician as appropriate to meet patient needs.  · Empower patient/caregiver to discuss treatment plan with Physician/care team.  · Encourage compliance with Physician follow-ups.  · Encourage family/caregiver to help patient perform ADLs according to the patient's capabilities.  · Recognize and provide educational material (KATIE).     Status  Met 1/3/2020    Patient/caregiver will verbalize importance of having a safe home environment by keeping room free of clutter, making sure no electrical cords placed in walk ways, making sure rooms  are well lit, placing non-skid bath mats and removing throw rugs within 2 weeks.  Interventions   · Assess patient's ability to perform ADLs.  · Collaborate with Physician as appropriate to meet patient needs.  · Empower patient/caregiver to discuss treatment plan with Physician/care team.  · Encourage compliance with Physician follow-ups.  · Recognize and provide educational material (ZUNILDACambly).     Status  · Met 1/3/2020                Patient Instructions     Instructions were provided via the [employer] patient resources and are available for the patient to view on the patient portal.    Next Steps: Case closure  No follow-ups on file.      Todays OPCM Self-Management Care Plan was developed with the patients/caregivers input and was based on identified barriers from todays assessment.  Goals were written today with the patient/caregiver and the patient has agreed to work towards these goals to improve his/her overall well-being. Patient verbalized understanding of the care plan, goals, and all of today's instructions. Encouraged patient/caregiver to communicate with his/her physician and health care team about health conditions and the treatment plan.  Provided my contact information today and encouraged patient/caregiver to call me with any questions as needed.

## 2020-01-07 ENCOUNTER — TELEPHONE (OUTPATIENT)
Dept: FAMILY MEDICINE | Facility: CLINIC | Age: 80
End: 2020-01-07

## 2020-01-07 NOTE — TELEPHONE ENCOUNTER
Pt prescribed gabapentin 100mg TID for lower back and leg pain; states it has been worse past 3 days and medication not helping; please advise

## 2020-01-07 NOTE — TELEPHONE ENCOUNTER
----- Message from Claude Gutierrez sent at 1/7/2020  4:00 PM CST -----  Contact: sELF  Type: Patient Call Back    Who called:Self    What is the request in detail:He states he's been in pain for three days now. He states medication is aiding him    Can the clinic reply by [facility]? No    Would the patient rather a call back or a response via [facility]? Call    Best call back number: [phone]    Additional Information:

## 2020-01-20 ENCOUNTER — TELEPHONE (OUTPATIENT)
Dept: FAMILY MEDICINE | Facility: CLINIC | Age: 80
End: 2020-01-20

## 2020-01-20 NOTE — TELEPHONE ENCOUNTER
----- Message from Charlene Rivera sent at 1/20/2020  1:12 PM CST -----  Contact: Ignacia/ [facility]/  [phone]  Type: Patient Call Back    Who called:  Patient    What is the request in detail:  [facility] is unable to except patient due to him having beg bugs.  Thank you    Would the patient rather a call back or a response via [facility]?   Call back    Best call back number:   [phone]

## 2020-01-21 ENCOUNTER — OUTPATIENT CASE MANAGEMENT (OUTPATIENT)
Dept: ADMINISTRATIVE | Facility: OTHER | Age: 80
End: 2020-01-21

## 2020-01-22 ENCOUNTER — HOSPITAL ENCOUNTER (EMERGENCY)
Facility: HOSPITAL | Age: 80
Discharge: HOME | End: 2020-01-22
Attending: EMERGENCY MEDICINE
Payer: MEDICARE

## 2020-01-22 VITALS
HEART RATE: 65 BPM | SYSTOLIC BLOOD PRESSURE: 141 MMHG | OXYGEN SATURATION: 99 % | TEMPERATURE: 99 F | RESPIRATION RATE: 19 BRPM | DIASTOLIC BLOOD PRESSURE: 65 MMHG | HEIGHT: 67 IN | WEIGHT: 117 LBS | BODY MASS INDEX: 18.36 KG/M2

## 2020-01-22 DIAGNOSIS — M54.50 CHRONIC LEFT-SIDED LOW BACK PAIN, UNSPECIFIED WHETHER SCIATICA PRESENT: ICD-10-CM

## 2020-01-22 DIAGNOSIS — G89.29 CHRONIC LEFT-SIDED LOW BACK PAIN, UNSPECIFIED WHETHER SCIATICA PRESENT: ICD-10-CM

## 2020-01-22 DIAGNOSIS — J30.9 ALLERGIC RHINITIS, UNSPECIFIED SEASONALITY, UNSPECIFIED TRIGGER: ICD-10-CM

## 2020-01-22 DIAGNOSIS — I10 HYPERTENSION, UNSPECIFIED TYPE: ICD-10-CM

## 2020-01-22 DIAGNOSIS — H40.9 GLAUCOMA, UNSPECIFIED GLAUCOMA TYPE, UNSPECIFIED LATERALITY: Primary | ICD-10-CM

## 2020-01-22 DIAGNOSIS — R51.9 NONINTRACTABLE HEADACHE, UNSPECIFIED CHRONICITY PATTERN, UNSPECIFIED HEADACHE TYPE: ICD-10-CM

## 2020-01-22 LAB
ANION GAP SERPL CALC-SCNC: 12 MMOL/L (ref 8–16)
ANION GAP SERPL CALC-SCNC: 14 MMOL/L (ref 8–16)
BUN SERPL-MCNC: 16 MG/DL (ref 8–23)
BUN SERPL-MCNC: 22 MG/DL (ref 6–30)
CALCIUM SERPL-MCNC: 9.5 MG/DL (ref 8.7–10.5)
CHLORIDE SERPL-SCNC: 106 MMOL/L (ref 95–110)
CHLORIDE SERPL-SCNC: 107 MMOL/L (ref 95–110)
CO2 SERPL-SCNC: 20 MMOL/L (ref 23–29)
CREAT SERPL-MCNC: 1.3 MG/DL (ref 0.5–1.4)
CREAT SERPL-MCNC: 1.3 MG/DL (ref 0.5–1.4)
EST. GFR  (AFRICAN AMERICAN): >60 ML/MIN/1.73 M^2
EST. GFR  (NON AFRICAN AMERICAN): 53 ML/MIN/1.73 M^2
GLUCOSE SERPL-MCNC: 101 MG/DL (ref 70–110)
GLUCOSE SERPL-MCNC: 65 MG/DL (ref 70–110)
GLUCOSE SERPL-MCNC: 82 MG/DL (ref 70–110)
HCT VFR BLD CALC: 37 %PCV (ref 36–54)
POC IONIZED CALCIUM: 1.1 MMOL/L (ref 1.06–1.42)
POC TCO2 (MEASURED): 27 MMOL/L (ref 23–29)
POCT GLUCOSE: 82 MG/DL (ref 70–110)
POTASSIUM BLD-SCNC: 5.6 MMOL/L (ref 3.5–5.1)
POTASSIUM SERPL-SCNC: 4.4 MMOL/L (ref 3.5–5.1)
SAMPLE: ABNORMAL
SODIUM BLD-SCNC: 138 MMOL/L (ref 136–145)
SODIUM SERPL-SCNC: 141 MMOL/L (ref 136–145)

## 2020-01-22 PROCEDURE — 63600175 PHARM REV CODE 636 W HCPCS: Mod: HCNC | Performed by: EMERGENCY MEDICINE

## 2020-01-22 PROCEDURE — 84132 ASSAY OF SERUM POTASSIUM: CPT | Mod: HCNC,91

## 2020-01-22 PROCEDURE — 96375 TX/PRO/DX INJ NEW DRUG ADDON: CPT | Mod: HCNC

## 2020-01-22 PROCEDURE — 25000003 PHARM REV CODE 250: Mod: HCNC | Performed by: EMERGENCY MEDICINE

## 2020-01-22 PROCEDURE — 82962 GLUCOSE BLOOD TEST: CPT | Mod: HCNC

## 2020-01-22 PROCEDURE — 96374 THER/PROPH/DIAG INJ IV PUSH: CPT | Mod: HCNC

## 2020-01-22 PROCEDURE — 99900035 HC TECH TIME PER 15 MIN (STAT): Mod: HCNC

## 2020-01-22 PROCEDURE — 84295 ASSAY OF SERUM SODIUM: CPT | Mod: HCNC

## 2020-01-22 PROCEDURE — 94761 N-INVAS EAR/PLS OXIMETRY MLT: CPT | Mod: HCNC

## 2020-01-22 PROCEDURE — 80048 BASIC METABOLIC PNL TOTAL CA: CPT | Mod: HCNC

## 2020-01-22 PROCEDURE — 99284 EMERGENCY DEPT VISIT MOD MDM: CPT | Mod: 25,HCNC

## 2020-01-22 PROCEDURE — 82565 ASSAY OF CREATININE: CPT | Mod: HCNC,91

## 2020-01-22 PROCEDURE — 82330 ASSAY OF CALCIUM: CPT | Mod: HCNC

## 2020-01-22 PROCEDURE — 85014 HEMATOCRIT: CPT | Mod: HCNC

## 2020-01-22 RX ORDER — TETRACAINE HYDROCHLORIDE 5 MG/ML
2 SOLUTION OPHTHALMIC
Status: COMPLETED | OUTPATIENT
Start: 2020-01-22 | End: 2020-01-22

## 2020-01-22 RX ORDER — PROCHLORPERAZINE EDISYLATE 5 MG/ML
5 INJECTION INTRAMUSCULAR; INTRAVENOUS
Status: COMPLETED | OUTPATIENT
Start: 2020-01-22 | End: 2020-01-22

## 2020-01-22 RX ORDER — TIMOLOL MALEATE 5 MG/ML
1 SOLUTION/ DROPS OPHTHALMIC 2 TIMES DAILY
Qty: 10 ML | Refills: 0 | Status: SHIPPED | OUTPATIENT
Start: 2020-01-22 | End: 2021-01-21

## 2020-01-22 RX ORDER — DIPHENHYDRAMINE HYDROCHLORIDE 50 MG/ML
25 INJECTION INTRAMUSCULAR; INTRAVENOUS
Status: COMPLETED | OUTPATIENT
Start: 2020-01-22 | End: 2020-01-22

## 2020-01-22 RX ORDER — BRIMONIDINE TARTRATE 2 MG/ML
1 SOLUTION/ DROPS OPHTHALMIC
Status: COMPLETED | OUTPATIENT
Start: 2020-01-22 | End: 2020-01-22

## 2020-01-22 RX ORDER — TIMOLOL MALEATE 5 MG/ML
1 SOLUTION/ DROPS OPHTHALMIC
Status: COMPLETED | OUTPATIENT
Start: 2020-01-22 | End: 2020-01-22

## 2020-01-22 RX ORDER — OXYMETAZOLINE HCL 0.05 %
1 SPRAY, NON-AEROSOL (ML) NASAL
Status: COMPLETED | OUTPATIENT
Start: 2020-01-22 | End: 2020-01-22

## 2020-01-22 RX ORDER — HYDRALAZINE HYDROCHLORIDE 25 MG/1
25 TABLET, FILM COATED ORAL
Status: COMPLETED | OUTPATIENT
Start: 2020-01-22 | End: 2020-01-22

## 2020-01-22 RX ORDER — BUTALBITAL, ACETAMINOPHEN AND CAFFEINE 50; 325; 40 MG/1; MG/1; MG/1
1 TABLET ORAL
Status: COMPLETED | OUTPATIENT
Start: 2020-01-22 | End: 2020-01-22

## 2020-01-22 RX ORDER — GABAPENTIN 100 MG/1
100 CAPSULE ORAL
COMMUNITY
End: 2021-03-19 | Stop reason: SDUPTHER

## 2020-01-22 RX ADMIN — BRIMONIDINE TARTRATE 1 DROP: 2 SOLUTION OPHTHALMIC at 08:01

## 2020-01-22 RX ADMIN — FLUORESCEIN SODIUM 1 EACH: 1 STRIP OPHTHALMIC at 07:01

## 2020-01-22 RX ADMIN — DIPHENHYDRAMINE HYDROCHLORIDE 25 MG: 50 INJECTION INTRAMUSCULAR; INTRAVENOUS at 09:01

## 2020-01-22 RX ADMIN — TIMOLOL MALEATE 1 DROP: 5 SOLUTION OPHTHALMIC at 08:01

## 2020-01-22 RX ADMIN — TETRACAINE HYDROCHLORIDE 2 DROP: 5 SOLUTION OPHTHALMIC at 07:01

## 2020-01-22 RX ADMIN — BUTALBITAL, ACETAMINOPHEN AND CAFFEINE 1 TABLET: 50; 325; 40 TABLET ORAL at 07:01

## 2020-01-22 RX ADMIN — PROCHLORPERAZINE EDISYLATE 5 MG: 5 INJECTION INTRAMUSCULAR; INTRAVENOUS at 09:01

## 2020-01-22 RX ADMIN — HYDRALAZINE HYDROCHLORIDE 25 MG: 25 TABLET, FILM COATED ORAL at 07:01

## 2020-01-22 RX ADMIN — Medication 1 SPRAY: at 09:01

## 2020-01-23 RX ORDER — LEVOCETIRIZINE DIHYDROCHLORIDE 5 MG/1
TABLET, FILM COATED ORAL
Qty: 90 TABLET | Refills: 3 | Status: SHIPPED | OUTPATIENT
Start: 2020-01-23 | End: 2020-11-19 | Stop reason: SDUPTHER

## 2020-01-23 NOTE — CARE UPDATE
Results for DWAYNE ZARATE (MRN [id number]) as of 1/22/2020 20:32   Ref. Range 1/22/2020 20:24   POC Sodium Latest Ref Range: 136 - 145 mmol/L 138   POC Potassium Latest Ref Range: 3.5 - 5.1 mmol/L 5.6 (H)   POC Chloride Latest Ref Range: 95 - 110 mmol/L 106   POC Anion Gap Latest Ref Range: 8 - 16 mmol/L 12   POC BUN Latest Ref Range: 6 - 30 mg/dL 22   POC Creatinine Latest Ref Range: 0.5 - 1.4 mg/dL 1.3   POC Glucose Latest Ref Range: 70 - 110 mg/dL 82   POC Ionized Calcium Latest Ref Range: 1.06 - 1.42 mmol/L 1.10   POC Hematocrit Latest Ref Range: 36 - 54 %PCV 37   POC TCO2 (MEASURED) Latest Ref Range: 23 - 29 mmol/L 27   Sample Unknown VENOUS     CHEM 8+ Results reported to LIANA Butler MD

## 2020-01-23 NOTE — ED TRIAGE NOTES
"Pt reports to ED with c/o "my eye is running on me and my nose is running on me." Pt states symptoms started 2 hours PTA. Reports left flank pain without any associated hematuria, dysuria. States that pain worsens with palpation. Denies recent injury. Patient noted to be hypertensive. States that he hasn't taken his BP meds because "I can't find them." Patient unable to specify which medications he is on. Reports that he had home health nurse but "she found bed bugs and I need to get rid of them for her to come back."     Two patient identifiers have been checked and are correct.      Appearance: Pt awake, alert & oriented to person, place & time. Pt in no acute distress at present time. Pt is clean and well groomed with clothes appropriately fastened.   Skin: Skin warm, dry & intact. Color consistent with ethnicity. Mucous membranes moist. No breakdown or brusing noted.   Musculoskeletal: Patient moving all extremities well, no obvious swelling or deformities noted. Generalized weakness noted.  Respiratory: Respirations spontaneous, even, and non-labored. Visible chest rise noted. Airway is open and patent. No accessory muscle use noted.   Neurologic: Sensation is intact. Speech is clear and appropriate. Eyes open spontaneously, behavior appropriate to situation, follows commands, facial expression symmetrical, bilateral hand grasp equal and even, purposeful motor response noted.  Cardiac: All peripheral pulses present. No Bilateral lower extremity edema. Cap refill is <3 seconds.  Abdomen: Abdomen soft, non-tender to palpation.   : Pt reports no dysuria or hematuria.             "

## 2020-01-23 NOTE — ED PROVIDER NOTES
Encounter Date: 1/22/2020    SCRIBE #1 NOTE: I, Leeanne Torrez, am scribing for, and in the presence of,  Ora Butler MD. I have scribed the following portions of the note - Other sections scribed: HPI, ROS, PE.       History     Chief Complaint   Patient presents with    Headache     Pt arrived via EMS c/o headache, chronic back pain and runny nose. States has not taken BP medications in past few days.      CC: HA; Eye discharge    Patient is a 79 y.o male with a PMHx of HTN, CAD, and CVA who presents to the ED complaining of a HA that began PTA. Patient is a poor historian. He primarily complains of right eye discharge and rhinorrhea. Onset is unclear, he denies taking any medications at home for his symptoms. Patient is unsure if he has eye pain or not but complains of headache on the right side. Per medical records, he was evaluated at [facility] yesterday for similar complaints. Patient was given HA medications with reported improvement and discharged home.  On chart review, I see that he was admitted January 17th to [facility].  At that time, there was concern for CVA.  He had an MRI done on January 17th that showed a chronic infarct in the right caudate head and right basil ganglia, without acute ischemia.  Per discharge summary, it was felt that his slurred speech was at baseline.  In addition, he also complains of left lower back pain that began about 30 mins ago, en route to the ED.  On chart review, patient noted to have chronic left-sided back pain and is on gabapentin for his symptoms. Also on chart review, it is noted that home health has been ordered for this patient.  Unfortunately, he has bed bugs in his home and they are unable to complete the service until he addresses the bed bugs in his house.  Patient states that he lives alone and is unable to find his home medications.     The history is provided by the patient. History limited by: Poor historian. No  was  "used.     Review of patient's allergies indicates:  No Known Allergies  Past Medical History:   Diagnosis Date    CAD (coronary artery disease) 2015    Cerebrovascular disease 2015    Depression     Essential hypertension, benign 2015    Other and unspecified hyperlipidemia 2015     Past Surgical History:   Procedure Laterality Date    CORONARY ANGIOPLASTY WITH STENT PLACEMENT      x3    stents      stents in heart in      Family History   Problem Relation Age of Onset    Cancer Father     Alcohol abuse Brother     Kidney disease Brother      Social History     Tobacco Use    Smoking status: Current Some Day Smoker     Packs/day: 0.50     Types: Cigarettes     Last attempt to quit: 2017     Years since quittin.9    Smokeless tobacco: Never Used    Tobacco comment: daily marijuana, history of crack cocaine abuse    Substance Use Topics    Alcohol use: Yes     Alcohol/week: 0.0 standard drinks     Frequency: 2-4 times a month     Comment: History of heavy ETOH in past; quit 2 yrs ago    Drug use: Yes     Types: Marijuana, "Crack" cocaine     Review of Systems   Constitutional: Negative for chills and fever.   HENT: Positive for rhinorrhea. Negative for congestion and sore throat.    Eyes: Positive for discharge. Negative for visual disturbance.   Respiratory: Negative for cough and shortness of breath.    Cardiovascular: Negative for chest pain.   Gastrointestinal: Negative for abdominal pain, nausea and vomiting.   Genitourinary: Negative for dysuria.   Musculoskeletal: Positive for back pain (Left lower back).   Skin: Negative for rash.   Neurological: Positive for headaches.   Psychiatric/Behavioral: Negative for confusion.       Physical Exam     Initial Vitals [20 1838]   BP Pulse Resp Temp SpO2   (!) 189/80 71 18 98 °F (36.7 °C) 100 %      MAP       --         Physical Exam    Nursing note and vitals reviewed.  Constitutional: He appears well-developed " and well-nourished. He is not diaphoretic. No distress.   HENT:   Head: Normocephalic.   Mouth/Throat: Oropharynx is clear and moist.   Edentulous, oropharnyx is moist. No mucosal edema or nasal discharge noted. Patient wipes R eye with rag, no discharge or tearing noted.    Eyes: EOM and lids are normal. Pupils are equal, round, and reactive to light. Right eye exhibits no chemosis, no discharge and no exudate. Left eye exhibits no chemosis, no discharge and no exudate.   Slit lamp exam:       The right eye shows no fluorescein uptake.        The left eye shows no fluorescein uptake.   Right eye with slight conjunctival injection.  No discharge noted.    IOP L eye: 34, R eye: 54  Visual acuity: L eye 20/100, R eye 20/200, both 20/200     Neck: Neck supple.   Cardiovascular: Normal rate and regular rhythm.   Pulmonary/Chest: Breath sounds normal. No respiratory distress.   Abdominal: Soft. Bowel sounds are normal.   Musculoskeletal: He exhibits no edema.   Neurological: He is alert. No sensory deficit.   Moves all 4 extremities. Slightly slurred speech. No facial droop.   Skin: Skin is warm and dry.   Psychiatric: He has a normal mood and affect.         ED Course   Procedures  Labs Reviewed   BASIC METABOLIC PANEL - Abnormal; Notable for the following components:       Result Value    CO2 20 (*)     Glucose 65 (*)     eGFR if non  53 (*)     All other components within normal limits   ISTAT PROCEDURE - Abnormal; Notable for the following components:    POC Potassium 5.6 (*)     All other components within normal limits   POCT GLUCOSE   ISTAT CHEM8   POCT GLUCOSE MONITORING CONTINUOUS          Imaging Results    None          Medical Decision Making:   History:   Old Medical Records: I decided to obtain old medical records.  Initial Assessment:   79-year-old male with history of CKD, CAD, CVA presents with multiple complaints.  He is primarily concerned with right eye drainage and nasal discharge.  Additionally, he complains of a right-sided headache and left-sided low back pain. Most of his history is obtained from the chart as he is a poor historian, often does not provide details regarding onset or character of his symptoms. He was evaluated yesterday at [facility] for headache and was discharged after symptomatic improvement.  Additionally, he was evaluated at [facility] on January 17th and underwent MRI of the brain, CT head and echocardiogram during his admission.  MRI did show a chronic infarct but no acute ischemia.  His exam today is notable for his hypertension which he notes he has not been taking his home medications.  He wipes his right eye but there is no visible discharge or drainage.  The right eye has slight conjunctival injection.  His speech is noted to be slightly slurred and according to my review of his chart, this is not new.  He is moving all 4 extremities. Given recent workup for CVA, I do not think his headache represents CVA or a subarachnoid hemorrhage or meningitis.  With regards to his right eye drainage, I plan to evaluate with fluorescein staining for corneal abrasion, will check intra-ocular pressure to assess for acute glaucoma.  I plan to treat this patient symptomatically with a Fioricet.  Clinical Tests:   Lab Tests: Ordered and Reviewed  The following lab test(s) were unremarkable: BMP  ED Management:  IOP elevated, transfer center contacted for ophthalmology consult. Timolol and brimonidien ordered.     Update:  On reassessment, patient is sleeping in bed.  He tolerated p.o..  He has no complaints. I reviewed medication instructions, timolol drops to be prescribed discharge 1 drop each eye b.i.d. and to follow up with Ophthalmology.  BMP was ordered because potassium level high on i-STAT, potassium level 4.4 on BMP, slightly hemolyzed.  His glucose was 65.  He was given a sandwich and rechecked glucose 82.            Scribe Attestation:    Scribe #1: I performed the above scribed service and the documentation accurately describes the services I performed. I attest to the accuracy of the note.            ED Course as of Jan 22 2256 Wed Jan 22, 2020 2034 POC Potassium(!): 5.6 [LH]   2111 Patient reports I feels improved.  I rechecked his intra-ocular pressure on the right side, it is now 26 1 hr after receiving timolol and brimonidine drops. He now complains of hiccupps, states the back of his head hurts. Will give dose of compazine/benadryl and reassess.     [LH]   2118 Spoke with Dr. Plummer (ophthalmology [facility])- he recommends patient to be started on timolol drops BID, call eye clinic in AM for appointment.     [LH]      ED Course User Index  [LH] Ora Butler MD                Clinical Impression:       ICD-10-CM ICD-9-CM   1. Glaucoma, unspecified glaucoma type, unspecified laterality H40.9 365.9   2. Nonintractable headache, unspecified chronicity pattern, unspecified headache type R51 784.0   3. Chronic left-sided low back pain, unspecified whether sciatica present M54.5 724.2    G89.29 338.29   4. Hypertension, unspecified type I10 401.9                    I, Ora Butler MD, personally performed the services described in this documentation. All medical record entries made by the scribe were at my direction and in my presence.  I have reviewed the chart and agree that the record reflects my personal performance and is accurate and complete.           Ora Butler MD  01/22/20 2258

## 2020-01-28 ENCOUNTER — TELEPHONE (OUTPATIENT)
Dept: FAMILY MEDICINE | Facility: CLINIC | Age: 80
End: 2020-01-28

## 2020-01-28 DIAGNOSIS — R26.2 IMPAIRED AMBULATION: Primary | ICD-10-CM

## 2020-01-28 NOTE — TELEPHONE ENCOUNTER
----- Message from Amrita Carvajal sent at 1/28/2020 10:41 AM CST -----  Contact: Self [phone]  Type: Patient Call Back    Who called:Self    What is the request in detail: pt is calling in regards to getting a hoveround. He states that he is falling with the walker    Can the clinic reply by [facility]? Call back    Would the patient rather a call back or a response via [facility]? Call back    Best call back number: [phone]

## 2020-01-29 ENCOUNTER — TELEPHONE (OUTPATIENT)
Dept: FAMILY MEDICINE | Facility: CLINIC | Age: 80
End: 2020-01-29
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
## 2020-01-29 NOTE — PROGRESS NOTES
Outpatient Care Management  Plan of Care Follow Up Visit    Patient: Otis Rodriguez  MRN: [id number]  Date of Service: 01/21/2020  Completed by: Chantel Russ RN  Referral Date: 08/17/2019  Program: Case Management (High Risk)    Reason for Visit   Patient presents with    OPCM RN First Follow-Up Attempt    Update Plan Of Care       Brief Summary: Pt reports that he is not getting home health because he has bedbugs. Pt reports that he does not have any bites from the bedbugs. Pt reports that he has notified the Landlord that he has bedbugs. Pt reports that no one has come to spray the apartment. Pt reports that he plans to get new furniture. Pt reports that he has had recent falls. Pt reports that he needs a hover round scooter to decrease the risk of falls. Referral noted for physical medicine. Pt reports that he is not getting home health due to having the bedbugs in the apartment. Pt reports that he is interested in receiving therapy. Pt reports that if he could get transportation he would be interested in Outpatient therapy. Educated pt to make sure that his floors of clear of clutter, have adequate lighting when ambulating, and to try not to ambulate when he feels dizzy. Message sent to PCP'S staff to notify of pt's interest for therapy due to falls. [facility]        Patient Summary     Involvement of Care:  Do I have permission to speak with other family members about your care? No      Patient Reported Labs & Vitals:  1.  Any Patient Reported Labs & Vitals?     2.  Patient Reported Blood Pressure:     3.  Patient Reported Pulse:     4.  Patient Reported Weight (Kg):     5.  Patient Reported Blood Glucose (mg/dl):       Medical History:  Reviewed medical history with patient and/or caregiver    Social History:  Reviewed social history with patient and/or caregiver    Clinical Assessment     Reviewed and provided basic information on available community  resources for mental health, transportation, wellness resources, and palliative care programs with patient and/or caregiver.    Complex Care Plan     Care plan was discussed and completed today with input from patient and/or caregiver.    Goals      Patient/caregiver will accept life style changes to manage and improve Falls prior to discharge from OPCM. - Priority: High      Overall Time to Completion  1 month from 01/29/2020     OPCM Identified Patient Needs:             OPCM Identified Disease Education Needs:       Short Term Goals  Patient/caregiver will verbalize 2 ways of preventing complications due to disease process within 3 weeks.  Interventions   · Assess patient's ability to perform ADLs.1/29/2020  · Collaborate with Physician as appropriate to meet patient needs.1/29/2020  · Facilitate referral to Outpatient Rehab.1/29/2020  · Recognize and provide educational material (KATIE).     Status  · Partially met             Patient/caregiver will have an action plan in place to manage and prevent complications of bedbugs prior to discharge from OPCM. - Priority: High      Overall Time to Completion  2 months from 11/21/2019     OPCM Identified Patient Barriers:             OPCM Identified Disease Education Barriers:       Short Term Goals  Patient/caregiver will verbalize 2 strategies to maximize safety/get rid of bedbugs  within 1 month.  Interventions   · Assess patient's ability to perform ADLs. 1/29/2020  · Discuss appropriate use of Home health with patient/caregiver.1/29/2020  · Facilitate Home Health Services. ( Reports that he was with [facility])  · Recognize and provide educational material (KATIE).  · Refer to Outpatient Case Management Social Worker.   ·   · Patient/Caregiver reports that he will notify the landlord again about the bedbugs by  2 weeks. Patient/Caregiver agrees to OPCM follow up within 2 weeks to assess progress to goal. 1/29/2020    Patient/Caregiver agrees to  start the cleaning process by  2weeks.  Patient/Caregiver agrees to OPCM follow up within 2 weeks to assess progress to goal.      Status  Partially met 1/3/2020               Patient/caregiver will have an action plan in place to manage and prevent complications of high blood pressure prior to discharge from OPCM. - Priority: High      Overall Time to Completion  2 months from 09/09/2019     OPCM Identified Patient Barriers:  Health Literacy: Care plan created   Fall Risk: Care plan created      RN Identified Patient Barriers:      Short Term Goals      Patient/caregiver will verbalize 2 risk factors of Hypertension within 3 weeks.  Interventions   · Assess for availability of working blood pressure cuff in home setting.  · Collaborate with Physician as appropriate to meet patient needs.  · Encourage Dietary Compliance.  · Encourage Exercise.  · Encourage Medication Compliance.  · Facilitate medical appointments.  · Recognize and provide educational material (KATIE).  ·      Status  Met    Patient/caregiver will verbalize 2 signs and symptoms of Hypertension within 3 weeks.   Interventions   · Assess for availability of working blood pressure cuff in home setting.  · Collaborate with Physician as appropriate to meet patient needs.  · Recognize and provide educational material (KATIE).  · .     Status  · Met      Patient/caregiver will verbalize 2 ways of preventing complications due to disease process within 3 weeks.  Interventions   · Assess for availability of working blood pressure cuff in home setting.  · Collaborate with Physician as appropriate to meet patient needs.  · Empower patient/caregiver to discuss treatment plan with Physician/care team.  · Encourage Dietary Compliance.  · Encourage Exercise.  · Encourage Medication Compliance.  · Recognize and provide educational material (KATIE).  · Review eating/nutrition habits.     Status  · Met 11/21/19           Clinical Reference Documents Added to  Patient Instructions       Document    FALL DUE TO DIZZINESS, WEAKNESS, OR LOSS OF BALANCE (ENGLISH)    FALLS, PREVENTING, ARE YOU AT RISK OF FALLING? (ENGLISH)    FALLS, PREVENTING, HOW TO PREPARE AND WHAT TO DO (ENGLISH)             Patient/caregiver will have Safety Plan in place prior to discharge from OPCM. - Priority: High      Overall Time to Completion  2 months from 09/09/2019      [facility] Identified Patient Barriers:  Health Literacy: Care plan created  Fall Risk: Care plan created      RN Identified Patient Barriers:      Short Term Goals      Patient/caregiver will identify 2 supports or services to maintain or improve current functional status within 3 weeks.  Interventions   · Assess patient's ability to perform ADLs.  · Collaborate with Physician as appropriate to meet patient needs.  · Empower patient/caregiver to discuss treatment plan with Physician/care team.  · Encourage compliance with Physician follow-ups.  · Encourage family/caregiver to help patient perform ADLs according to the patient's capabilities.  · Recognize and provide educational material ([employer]).     Status  Met 1/3/2020    Patient/caregiver will verbalize importance of having a safe home environment by keeping room free of clutter, making sure no electrical cords placed in walk ways, making sure rooms are well lit, placing non-skid bath mats and removing throw rugs within 2 weeks.  Interventions   · Assess patient's ability to perform ADLs.  · Collaborate with Physician as appropriate to meet patient needs.  · Empower patient/caregiver to discuss treatment plan with Physician/care team.  · Encourage compliance with Physician follow-ups.  · Recognize and provide educational material ([employer]).     Status  · Met 1/3/2020                Patient Instructions     Instructions were provided via the [employer] patient resources and are available for the patient to view on the patient portal.    Next Steps: Collaborate with LMSW about the  bedbugs and treatment in the aris                      F/u with outpatient therapy                      Pending case closure    No follow-ups on file.      Todays OPCM Self-Management Care Plan was developed with the patients/caregivers input and was based on identified barriers from todays assessment.  Goals were written today with the patient/caregiver and the patient has agreed to work towards these goals to improve his/her overall well-being. Patient verbalized understanding of the care plan, goals, and all of today's instructions. Encouraged patient/caregiver to communicate with his/her physician and health care team about health conditions and the treatment plan.  Provided my contact information today and encouraged patient/caregiver to call me with any questions as needed.

## 2020-01-29 NOTE — TELEPHONE ENCOUNTER
----- Message from Chantel Russ RN sent at 1/29/2020 12:45 PM CST -----  Contact: ZABRINA Jean this is Chantel with [facility]. Pt reports that he is experiencing an increase in falls. Pt reports that he would be interested in receiving outpt PT therapy due to the falls.    Please advise

## 2020-02-05 ENCOUNTER — OFFICE VISIT (OUTPATIENT)
Dept: FAMILY MEDICINE | Facility: CLINIC | Age: 80
End: 2020-02-05
Payer: MEDICARE

## 2020-02-05 VITALS
WEIGHT: 118.81 LBS | DIASTOLIC BLOOD PRESSURE: 52 MMHG | RESPIRATION RATE: 16 BRPM | HEIGHT: 67 IN | BODY MASS INDEX: 18.65 KG/M2 | OXYGEN SATURATION: 97 % | TEMPERATURE: 98 F | SYSTOLIC BLOOD PRESSURE: 112 MMHG | HEART RATE: 72 BPM

## 2020-02-05 DIAGNOSIS — Z99.89 DEPENDENT ON WALKER FOR AMBULATION: ICD-10-CM

## 2020-02-05 DIAGNOSIS — F33.2 MAJOR DEPRESSIVE DISORDER, RECURRENT, SEVERE WITHOUT PSYCHOTIC FEATURES: ICD-10-CM

## 2020-02-05 DIAGNOSIS — I50.32 CHRONIC DIASTOLIC HEART FAILURE: ICD-10-CM

## 2020-02-05 DIAGNOSIS — J30.9 ALLERGIC RHINITIS, UNSPECIFIED SEASONALITY, UNSPECIFIED TRIGGER: Primary | ICD-10-CM

## 2020-02-05 DIAGNOSIS — R29.6 FREQUENT FALLS: ICD-10-CM

## 2020-02-05 DIAGNOSIS — I70.0 AORTIC ATHEROSCLEROSIS: ICD-10-CM

## 2020-02-05 DIAGNOSIS — I20.89 STABLE ANGINA PECTORIS: ICD-10-CM

## 2020-02-05 PROCEDURE — 1159F MED LIST DOCD IN RCRD: CPT | Mod: HCNC,S$GLB,, | Performed by: FAMILY MEDICINE

## 2020-02-05 PROCEDURE — 1125F PR PAIN SEVERITY QUANTIFIED, PAIN PRESENT: ICD-10-PCS | Mod: HCNC,S$GLB,, | Performed by: FAMILY MEDICINE

## 2020-02-05 PROCEDURE — 1159F PR MEDICATION LIST DOCUMENTED IN MEDICAL RECORD: ICD-10-PCS | Mod: HCNC,S$GLB,, | Performed by: FAMILY MEDICINE

## 2020-02-05 PROCEDURE — 3078F DIAST BP <80 MM HG: CPT | Mod: HCNC,CPTII,S$GLB, | Performed by: FAMILY MEDICINE

## 2020-02-05 PROCEDURE — 99499 UNLISTED E&M SERVICE: CPT | Mod: S$GLB,,, | Performed by: FAMILY MEDICINE

## 2020-02-05 PROCEDURE — 99499 RISK ADDL DX/OHS AUDIT: ICD-10-PCS | Mod: S$GLB,,, | Performed by: FAMILY MEDICINE

## 2020-02-05 PROCEDURE — 99214 PR OFFICE/OUTPT VISIT, EST, LEVL IV, 30-39 MIN: ICD-10-PCS | Mod: HCNC,S$GLB,, | Performed by: FAMILY MEDICINE

## 2020-02-05 PROCEDURE — 99214 OFFICE O/P EST MOD 30 MIN: CPT | Mod: HCNC,S$GLB,, | Performed by: FAMILY MEDICINE

## 2020-02-05 PROCEDURE — 99999 PR PBB SHADOW E&M-EST. PATIENT-LVL III: ICD-10-PCS | Mod: PBBFAC,HCNC,, | Performed by: FAMILY MEDICINE

## 2020-02-05 PROCEDURE — 3074F SYST BP LT 130 MM HG: CPT | Mod: HCNC,CPTII,S$GLB, | Performed by: FAMILY MEDICINE

## 2020-02-05 PROCEDURE — 1125F AMNT PAIN NOTED PAIN PRSNT: CPT | Mod: HCNC,S$GLB,, | Performed by: FAMILY MEDICINE

## 2020-02-05 PROCEDURE — 1101F PT FALLS ASSESS-DOCD LE1/YR: CPT | Mod: HCNC,CPTII,S$GLB, | Performed by: FAMILY MEDICINE

## 2020-02-05 PROCEDURE — 3078F PR MOST RECENT DIASTOLIC BLOOD PRESSURE < 80 MM HG: ICD-10-PCS | Mod: HCNC,CPTII,S$GLB, | Performed by: FAMILY MEDICINE

## 2020-02-05 PROCEDURE — 3074F PR MOST RECENT SYSTOLIC BLOOD PRESSURE < 130 MM HG: ICD-10-PCS | Mod: HCNC,CPTII,S$GLB, | Performed by: FAMILY MEDICINE

## 2020-02-05 PROCEDURE — 1101F PR PT FALLS ASSESS DOC 0-1 FALLS W/OUT INJ PAST YR: ICD-10-PCS | Mod: HCNC,CPTII,S$GLB, | Performed by: FAMILY MEDICINE

## 2020-02-05 PROCEDURE — 99999 PR PBB SHADOW E&M-EST. PATIENT-LVL III: CPT | Mod: PBBFAC,HCNC,, | Performed by: FAMILY MEDICINE

## 2020-02-05 RX ORDER — FLUTICASONE PROPIONATE 50 MCG
2 SPRAY, SUSPENSION (ML) NASAL DAILY
Qty: 1 G | Refills: 11 | Status: SHIPPED | OUTPATIENT
Start: 2020-02-05 | End: 2020-02-05 | Stop reason: SDUPTHER

## 2020-02-05 RX ORDER — FLUTICASONE PROPIONATE 50 MCG
2 SPRAY, SUSPENSION (ML) NASAL DAILY
Qty: 1 G | Refills: 11 | Status: SHIPPED | OUTPATIENT
Start: 2020-02-05

## 2020-02-05 NOTE — PROGRESS NOTES
Subjective:       Patient ID: Otis Rodriguez     Chief Complaint: Hospital Follow Up      Bob Rodriguez is a 79 y.o. male presents with watery eyes and runny nose x 2 weeks.  Patient on zyrtec.  No cough or feveR.  Also with bilateral knee pain and swelling left knee.    Review of patient's allergies indicates:  No Known Allergies    Current Outpatient Medications:     amLODIPine (NORVASC) 10 MG tablet, Take 1 tablet (10 mg total) by mouth once daily., Disp: 90 tablet, Rfl: 1    aspirin (ECOTRIN) 325 MG EC tablet, TAKE 1 TABLET (325 MG TOTAL) BY MOUTH ONCE DAILY., Disp: 90 tablet, Rfl: 3    atorvastatin (LIPITOR) 80 MG tablet, Take 1 tablet (80 mg total) by mouth once daily., Disp: 90 tablet, Rfl: 0    blood pressure monitor Kit, , Disp: , Rfl:     buPROPion (WELLBUTRIN SR) 200 MG SR12, Take 1 tablet (200 mg total) by mouth 2 (two) times daily., Disp: 180 tablet, Rfl: 1    clopidogrel (PLAVIX) 75 mg tablet, Take 75 mg by mouth once daily. , Disp: , Rfl:     dextran 70-hypromellose (TEARS) ophthalmic solution, Place 1 drop into both eyes every 6 (six) hours. (Patient not taking: Reported on 12/12/2019), Disp: 30 mL, Rfl: 0    fluticasone propionate (FLONASE) 50 mcg/actuation nasal spray, 2 sprays (100 mcg total) by Each Nostril route once daily., Disp: 1 g, Rfl: 11    furosemide (LASIX) 40 MG tablet, Take 1 tablet (40 mg total) by mouth daily, Disp: , Rfl: 5    gabapentin (NEURONTIN) 100 MG capsule, Take 1 capsule (100 mg total) by mouth 3 (three) times daily as needed (leg pain)., Disp: 90 capsule, Rfl: 0    gabapentin (NEURONTIN) 100 MG capsule, Take 100 mg by mouth., Disp: , Rfl:     hydrALAZINE (APRESOLINE) 25 MG tablet, Take 25 mg by mouth 3 (three) times daily., Disp: , Rfl:     levocetirizine (XYZAL) 5 MG tablet, TAKE 1 TABLET BY MOUTH EVERY EVENING, Disp: 90 tablet, Rfl: 3    lisinopril (PRINIVIL,ZESTRIL) 40 MG tablet, Take 40 mg by mouth once daily., Disp: , Rfl: 11    metoprolol succinate  (TOPROL-XL) 50 MG 24 hr tablet, Take 1 tablet (50 mg total) by mouth once daily., Disp: 90 tablet, Rfl: 0    QUEtiapine (SEROQUEL) 100 MG Tab, Take 1 tablet (100 mg total) by mouth every evening., Disp: 90 tablet, Rfl: 3    ranolazine (RANEXA) 1,000 mg Tb12, Take 1,000 mg by mouth 2 (two) times daily. , Disp: , Rfl:     tamsulosin (FLOMAX) 0.4 mg Cap, Take 1 capsule (0.4 mg total) by mouth once daily., Disp: 90 capsule, Rfl: 3    timolol maleate 0.5% (TIMOPTIC) 0.5 % Drop, Place 1 drop into both eyes 2 (two) times daily., Disp: 10 mL, Rfl: 0    Past Medical History:   Diagnosis Date    CAD (coronary artery disease) 1/12/2015    Cerebrovascular disease 1/12/2015    Chronic diastolic heart failure 2/5/2020    Depression     Essential hypertension, benign 1/12/2015    Other and unspecified hyperlipidemia 1/12/2015    Stable angina pectoris 2/5/2020     Review of Systems   Constitutional: Negative for fever.   Respiratory: Negative for cough.    Cardiovascular: Negative for chest pain.   Musculoskeletal: Positive for arthralgias (knee) and joint swelling (left knee).        Frequent falls   Neurological: Negative for headaches.       Objective:      Physical Exam   Constitutional: He appears well-developed and well-nourished.   HENT:   Head: Normocephalic and atraumatic.   Mouth/Throat: Oropharynx is clear and moist. No oropharyngeal exudate.   Pale nasal mucosa   Pulmonary/Chest: Effort normal.   Musculoskeletal: He exhibits no edema.        Left knee: He exhibits swelling.   Neurological: He is alert.       Assessment:       1. Allergic rhinitis, unspecified seasonality, unspecified trigger    2. Major depressive disorder, recurrent, severe without psychotic features    3. Chronic diastolic heart failure    4. Stable angina pectoris    5. Aortic atherosclerosis    6. Frequent falls    7. Dependent on walker for ambulation        Plan:       Otis was seen today for hospital follow up.    Diagnoses and all  orders for this visit:    Allergic rhinitis, unspecified seasonality, unspecified trigger  -     fluticasone propionate (FLONASE) 50 mcg/actuation nasal spray; 2 sprays (100 mcg total) by Each Nostril route once daily.    Bilateral knee pain  Recommend tylenol as needed for pain    Major depressive disorder, recurrent, severe without psychotic features  Continues to have depression, on Wellbutrin    Chronic diastolic heart failure  No symptoms of heart failure    Stable angina pectoris  No chest pain    Aortic atherosclerosis  Clinically asymptomatic    Frequent falls  Home health refuses to come to home due to bed bug infestation    Dependent on walker for ambulation  Recommend NH placement.  Will arrange for transportation services

## 2020-02-11 ENCOUNTER — OFFICE VISIT (OUTPATIENT)
Dept: FAMILY MEDICINE | Facility: CLINIC | Age: 80
End: 2020-02-11
Payer: MEDICARE

## 2020-02-11 VITALS
BODY MASS INDEX: 19.48 KG/M2 | HEART RATE: 60 BPM | OXYGEN SATURATION: 98 % | TEMPERATURE: 98 F | SYSTOLIC BLOOD PRESSURE: 118 MMHG | WEIGHT: 124.13 LBS | RESPIRATION RATE: 16 BRPM | HEIGHT: 67 IN | DIASTOLIC BLOOD PRESSURE: 66 MMHG

## 2020-02-11 DIAGNOSIS — I25.10 CORONARY ARTERY DISEASE INVOLVING NATIVE CORONARY ARTERY WITHOUT ANGINA PECTORIS, UNSPECIFIED WHETHER NATIVE OR TRANSPLANTED HEART: ICD-10-CM

## 2020-02-11 DIAGNOSIS — J30.9 ALLERGIC RHINITIS, UNSPECIFIED SEASONALITY, UNSPECIFIED TRIGGER: Primary | ICD-10-CM

## 2020-02-11 DIAGNOSIS — H40.9 GLAUCOMA, UNSPECIFIED GLAUCOMA TYPE, UNSPECIFIED LATERALITY: ICD-10-CM

## 2020-02-11 PROCEDURE — 99999 PR PBB SHADOW E&M-EST. PATIENT-LVL V: CPT | Mod: PBBFAC,HCNC,, | Performed by: INTERNAL MEDICINE

## 2020-02-11 PROCEDURE — 99213 OFFICE O/P EST LOW 20 MIN: CPT | Mod: HCNC,S$GLB,, | Performed by: INTERNAL MEDICINE

## 2020-02-11 PROCEDURE — 1100F PTFALLS ASSESS-DOCD GE2>/YR: CPT | Mod: HCNC,CPTII,S$GLB, | Performed by: INTERNAL MEDICINE

## 2020-02-11 PROCEDURE — 1125F AMNT PAIN NOTED PAIN PRSNT: CPT | Mod: HCNC,S$GLB,, | Performed by: INTERNAL MEDICINE

## 2020-02-11 PROCEDURE — 3074F PR MOST RECENT SYSTOLIC BLOOD PRESSURE < 130 MM HG: ICD-10-PCS | Mod: HCNC,CPTII,S$GLB, | Performed by: INTERNAL MEDICINE

## 2020-02-11 PROCEDURE — 1159F MED LIST DOCD IN RCRD: CPT | Mod: HCNC,S$GLB,, | Performed by: INTERNAL MEDICINE

## 2020-02-11 PROCEDURE — 3288F PR FALLS RISK ASSESSMENT DOCUMENTED: ICD-10-PCS | Mod: HCNC,CPTII,S$GLB, | Performed by: INTERNAL MEDICINE

## 2020-02-11 PROCEDURE — 1125F PR PAIN SEVERITY QUANTIFIED, PAIN PRESENT: ICD-10-PCS | Mod: HCNC,S$GLB,, | Performed by: INTERNAL MEDICINE

## 2020-02-11 PROCEDURE — 99213 PR OFFICE/OUTPT VISIT, EST, LEVL III, 20-29 MIN: ICD-10-PCS | Mod: HCNC,S$GLB,, | Performed by: INTERNAL MEDICINE

## 2020-02-11 PROCEDURE — 3078F PR MOST RECENT DIASTOLIC BLOOD PRESSURE < 80 MM HG: ICD-10-PCS | Mod: HCNC,CPTII,S$GLB, | Performed by: INTERNAL MEDICINE

## 2020-02-11 PROCEDURE — 1159F PR MEDICATION LIST DOCUMENTED IN MEDICAL RECORD: ICD-10-PCS | Mod: HCNC,S$GLB,, | Performed by: INTERNAL MEDICINE

## 2020-02-11 PROCEDURE — 3288F FALL RISK ASSESSMENT DOCD: CPT | Mod: HCNC,CPTII,S$GLB, | Performed by: INTERNAL MEDICINE

## 2020-02-11 PROCEDURE — 3078F DIAST BP <80 MM HG: CPT | Mod: HCNC,CPTII,S$GLB, | Performed by: INTERNAL MEDICINE

## 2020-02-11 PROCEDURE — 1100F PR PT FALLS ASSESS DOC 2+ FALLS/FALL W/INJURY/YR: ICD-10-PCS | Mod: HCNC,CPTII,S$GLB, | Performed by: INTERNAL MEDICINE

## 2020-02-11 PROCEDURE — 3074F SYST BP LT 130 MM HG: CPT | Mod: HCNC,CPTII,S$GLB, | Performed by: INTERNAL MEDICINE

## 2020-02-11 PROCEDURE — 99999 PR PBB SHADOW E&M-EST. PATIENT-LVL V: ICD-10-PCS | Mod: PBBFAC,HCNC,, | Performed by: INTERNAL MEDICINE

## 2020-02-11 NOTE — PROGRESS NOTES
Subjective:       Patient ID: Otis Rodriguez is a 79 y.o. male.    Chief Complaint: Nasal Congestion and eyes watering      Patient of Selma Miles MD, here today for Urgent Care visit. Patient is alone.  Last visit with Dr. Miles on  2/5/2020. Dx of allergic rhinitis. Given Flonase.  Mr. Rodriguez is a poor historian. He does not seem to remember seeing Dr. Miles for same reason last week. However he reports using Flonase that she ordered, BID, he does not bring the spray today, states it is at his place. He however brings all of his other medications, among them eye drops that he states he has been using.   He states he is feeling better regarding runny nose, but still complains of watery eyes, bilaterally.  He would like to know if he needs to go on with Plavix. He thinks it is a pain medication?      HPI    Review of Systems   Constitutional: Negative for activity change, appetite change and fatigue.   HENT: Positive for rhinorrhea and sinus pain. Negative for sinus pressure and sore throat.    Eyes: Negative for pain and redness.        Watery eyes   Respiratory: Negative for cough, chest tightness, shortness of breath and wheezing.    Cardiovascular: Negative for chest pain, palpitations and leg swelling.   Gastrointestinal: Negative for abdominal pain and constipation.   Genitourinary: Negative for frequency and urgency.   Musculoskeletal: Negative for back pain, gait problem and myalgias.   Skin: Negative for color change.   Allergic/Immunologic: Negative for environmental allergies and food allergies.   Neurological: Negative for headaches.   Hematological: Negative for adenopathy. Does not bruise/bleed easily.   Psychiatric/Behavioral: Negative for behavioral problems, sleep disturbance and suicidal ideas. The patient is not nervous/anxious.        Objective:      Physical Exam   Constitutional: He appears well-developed and well-nourished.   HENT:   Left Ear: External ear normal.   Nose: Nose  "normal.   Mouth/Throat: Oropharynx is clear and moist.   Important amount of cerumen, but I can see the TM   Eyes: Pupils are equal, round, and reactive to light. Conjunctivae and EOM are normal.   Mildly watery eyes, no redness   Neck: Normal range of motion. No thyromegaly present.   Cardiovascular: Normal rate, regular rhythm, normal heart sounds and intact distal pulses.   Pulmonary/Chest: Effort normal and breath sounds normal. He has no wheezes. He exhibits no tenderness.   Abdominal: Soft. Bowel sounds are normal. He exhibits no mass. There is no tenderness.   Genitourinary: Rectum normal and prostate normal.   Musculoskeletal: He exhibits no tenderness or deformity.   Not tested   Lymphadenopathy:     He has no cervical adenopathy.   Neurological:   Not tested   Skin: Skin is warm and dry.   Psychiatric: He has a normal mood and affect. His behavior is normal. Judgment and thought content normal.   Nursing note and vitals reviewed.      Vitals:    02/11/20 0836   BP: 118/66   Pulse: 60   Resp: 16   Temp: 97.9 °F (36.6 °C)   TempSrc: Oral   SpO2: 98%   Weight: 56.3 kg (124 lb 1.9 oz)   Height: 5' 7" (1.702 m)     Body mass index is 19.44 kg/m².    RESULTS: Reviewed labs from last 12 months.    Assessment:       1. Allergic rhinitis, unspecified seasonality, unspecified trigger    2. Glaucoma, unspecified glaucoma type, unspecified laterality    3. Coronary artery disease involving native coronary artery without angina pectoris, unspecified whether native or transplanted heart        Plan:   Otis was seen today for nasal congestion and eyes watering.    Diagnoses and all orders for this visit:    Allergic rhinitis, unspecified seasonality, unspecified trigger  Comments:  was given Flonase by Dr. Miles, to be continued alongside antihistaminics    Glaucoma, unspecified glaucoma type, unspecified laterality  Comments:  as per Dx at ED in 1/2020? Was to f-up with Ophtalmo, no appt? Referred again asking for " pt to be seen soon. Right now using timolol eye drops  Orders:  -     Ambulatory referral/consult to Ophthalmology; Future    Coronary artery disease involving native coronary artery without angina pectoris, unspecified whether native or transplanted heart  Comments:  Pt on Plavix, wonders if pain med? 2 different types of pills in the bottle. Will inform Dr. Miles.      Follow up for f-up with Dr. Miles .

## 2020-02-11 NOTE — PATIENT INSTRUCTIONS

## 2020-02-13 ENCOUNTER — OUTPATIENT CASE MANAGEMENT (OUTPATIENT)
Dept: ADMINISTRATIVE | Facility: OTHER | Age: 80
End: 2020-02-13

## 2020-02-20 ENCOUNTER — OUTPATIENT CASE MANAGEMENT (OUTPATIENT)
Dept: ADMINISTRATIVE | Facility: OTHER | Age: 80
End: 2020-02-20

## 2020-03-25 ENCOUNTER — TELEPHONE (OUTPATIENT)
Dept: FAMILY MEDICINE | Facility: CLINIC | Age: 80
End: 2020-03-25

## 2020-03-25 NOTE — TELEPHONE ENCOUNTER
Patient calling regarding back pain and leg pain. Pt is not active on portal. Unable to give an Rx - need office visit. Notified patient PCP is not seeing any pt- unable to schedule virtual visit. Notified pt can go to urgent care to be advise. Verbalized understanding

## 2020-03-25 NOTE — TELEPHONE ENCOUNTER
----- Message from Kati Albright sent at 3/25/2020  2:35 PM CDT -----  Contact: self  Type: Patient Call Back    Who called self    What is the request in detail:pt is calling to tell Dr that he can't and you are having lower back pain.    Can the clinic reply by [facility]?no    Would the patient rather a call back or a response via [facility]?call    Best call back number:

## 2020-04-24 DIAGNOSIS — G47.00 INSOMNIA, UNSPECIFIED TYPE: ICD-10-CM

## 2020-04-24 RX ORDER — QUETIAPINE FUMARATE 100 MG/1
100 TABLET, FILM COATED ORAL NIGHTLY
Qty: 90 TABLET | Refills: 3 | Status: SHIPPED | OUTPATIENT
Start: 2020-04-24 | End: 2021-04-24

## 2020-04-24 RX ORDER — AMLODIPINE BESYLATE 10 MG/1
10 TABLET ORAL DAILY
Qty: 90 TABLET | Refills: 1 | Status: SHIPPED | OUTPATIENT
Start: 2020-04-24 | End: 2020-08-10 | Stop reason: SDUPTHER

## 2020-04-24 NOTE — TELEPHONE ENCOUNTER
----- Message from Tammy Gutierrez sent at 4/24/2020  7:07 AM CDT -----  Type: RX Refill Request    Who Called:  Self     Have you contacted your pharmacy: no     Refill or New Rx: Refill     RX Name and Strength:  QUEtiapine (SEROQUEL) 100 MG Tab  amLODIPine (NORVASC) 10 MG tablet      Preferred Pharmacy with phone number:[facility] [address] [phone] (Phone)  [phone] (Fax)        Local or Mail Order: Local     Ordering Provider: Dr. Miles    Would the patient rather a call back or a response via My [facility]?  Call     Best Call Back Number: [phone]

## 2020-04-27 ENCOUNTER — TELEPHONE (OUTPATIENT)
Dept: FAMILY MEDICINE | Facility: CLINIC | Age: 80
End: 2020-04-27

## 2020-04-27 NOTE — TELEPHONE ENCOUNTER
----- Message from Aylin Reynolds sent at 4/27/2020  7:51 AM CDT -----  Contact: self  Type: Patient Call Back       What is the request in detail:  Pt calling to speak to a nurse regarding back and leg pain     Can the clinic reply by [facility]? No       Would the patient rather a call back or a response via [facility]? Call back       Best call back number:  [phone]

## 2020-05-06 ENCOUNTER — TELEPHONE (OUTPATIENT)
Dept: FAMILY MEDICINE | Facility: CLINIC | Age: 80
End: 2020-05-06

## 2020-05-06 NOTE — TELEPHONE ENCOUNTER
----- Message from Niki Roslaes sent at 5/6/2020  4:05 PM CDT -----  [facility]   Type: Patient Call Back    [facility]     What is the request in detail: [facility] is requesting a call back in regards to the pt home health.     Can the clinic reply by [facility]?    Would the patient rather a call back or a response via [facility]? Call back     Best call back number: [phone]

## 2020-05-07 ENCOUNTER — OFFICE VISIT (OUTPATIENT)
Dept: FAMILY MEDICINE | Facility: CLINIC | Age: 80
End: 2020-05-07
Payer: MEDICARE

## 2020-05-07 ENCOUNTER — TELEPHONE (OUTPATIENT)
Dept: FAMILY MEDICINE | Facility: CLINIC | Age: 80
End: 2020-05-07

## 2020-05-07 DIAGNOSIS — M54.50 LOW BACK PAIN, UNSPECIFIED BACK PAIN LATERALITY, UNSPECIFIED CHRONICITY, UNSPECIFIED WHETHER SCIATICA PRESENT: ICD-10-CM

## 2020-05-07 DIAGNOSIS — I21.4 NSTEMI (NON-ST ELEVATED MYOCARDIAL INFARCTION): Primary | ICD-10-CM

## 2020-05-07 PROCEDURE — 99441 PR PHYSICIAN TELEPHONE EVALUATION 5-10 MIN: ICD-10-PCS | Mod: HCNC,95,, | Performed by: FAMILY MEDICINE

## 2020-05-07 PROCEDURE — 99441 PR PHYSICIAN TELEPHONE EVALUATION 5-10 MIN: CPT | Mod: HCNC,95,, | Performed by: FAMILY MEDICINE

## 2020-05-07 NOTE — TELEPHONE ENCOUNTER
Patient is complaining of severe bilateral leg pain; states that he d/c from the hospital w/o any pain medication. Audio virtual visit scheduled

## 2020-05-07 NOTE — PROGRESS NOTES
Established Patient - Audio Only Telehealth Visit     The patient location is: [place]  The chief complaint leading to consultation is: follow up recent hospitalization for NSTEMI  Visit type: Virtual visit with audio only (telephone)  Total time spent with patient: 10 min       The reason for the audio only service rather than synchronous audio and video virtual visit was related to technical difficulties or patient preference/necessity.     Each patient to whom I provide medical services by telemedicine is:  (1) informed of the relationship between the physician and patient and the respective role of any other health care provider with respect to management of the patient; and (2) notified that they may decline to receive medical services by telemedicine and may withdraw from such care at any time. Patient verbally consented to receive this service via voice-only telephone call.       HPI:  Patient is a poor historian.  This is a 79-year-old male with a history of hypertension HLD, CVA, BPH, who was admitted secondary to having chest pain, initial troponin was minimally elevated 0.8, patient continue on aspirin, statin, Plavix, Cardiology was consulted, patient underwent left heart catheterization on 05/06/2020, findings showed no change in anatomy, D1 and left circumflex are complete totally occluded, previous stents patent to L and LAD. Cardiology cleared patient for discharge.  His major complaint today is LBP and leg pain.  Denies chest pain.      Reviewed recent hospital summary, I.e. discharge summary and labs in care everywhere.    Assessment and plan:    Diagnoses and all orders for this visit:    NSTEMI (non-ST elevated myocardial infarction)  Patient has follow up appointment with cardiologist    Low Back pain  Recommend tylenol as needed for pain          This service was not originating from a related E/M service provided within the previous 7 days nor will  to an E/M service or procedure within the  next 24 hours or my soonest available appointment.  Prevailing standard of care was able to be met in this audio-only visit.

## 2020-05-07 NOTE — TELEPHONE ENCOUNTER
----- Message from Tammy Gutierrez sent at 5/7/2020  8:56 AM CDT -----  Type: Patient Call Back    Who called: Self     What is the request in detail:patient states he was recently discharged form the [facility] his paperwork states to call his doctor if he has dizzy spells and he is very dizzy. Please call     Can the clinic reply by [facility]? No     Would the patient rather a call back or a response via [facility]?  Call     Best call back number: [phone]

## 2020-05-08 ENCOUNTER — TELEPHONE (OUTPATIENT)
Dept: FAMILY MEDICINE | Facility: CLINIC | Age: 80
End: 2020-05-08

## 2020-05-08 NOTE — TELEPHONE ENCOUNTER
"----- Message from Evelin Wadsworth sent at 5/8/2020  1:17 PM CDT -----  Contact: Jarad"[facility]" [phone]  .Type: Patient Call Back    Who called: Jarad  [facility] [phone]    What is the request in detail: went to pt's home there was no answer and pt wasn't home. Called pt several times to reschedule and every time he told them to show up the pt was not home     Can the clinic reply by [facility]? Call back     Would the patient rather a call back or a response via [facility]? Call back     Best call back number: [phone]          "

## 2020-05-15 ENCOUNTER — OFFICE VISIT (OUTPATIENT)
Dept: FAMILY MEDICINE | Facility: CLINIC | Age: 80
End: 2020-05-15
Payer: MEDICARE

## 2020-05-15 DIAGNOSIS — G47.00 INSOMNIA, UNSPECIFIED TYPE: ICD-10-CM

## 2020-05-15 DIAGNOSIS — J30.89 NON-SEASONAL ALLERGIC RHINITIS, UNSPECIFIED TRIGGER: Primary | ICD-10-CM

## 2020-05-15 PROCEDURE — 99441 PR PHYSICIAN TELEPHONE EVALUATION 5-10 MIN: CPT | Mod: HCNC,95,, | Performed by: FAMILY MEDICINE

## 2020-05-15 PROCEDURE — 99441 PR PHYSICIAN TELEPHONE EVALUATION 5-10 MIN: ICD-10-PCS | Mod: HCNC,95,, | Performed by: FAMILY MEDICINE

## 2020-05-15 RX ORDER — AZELASTINE 1 MG/ML
2 SPRAY, METERED NASAL 2 TIMES DAILY
Qty: 30 ML | Refills: 0 | Status: SHIPPED | OUTPATIENT
Start: 2020-05-15 | End: 2021-05-15

## 2020-05-15 RX ORDER — QUETIAPINE FUMARATE 50 MG/1
50-100 TABLET, FILM COATED ORAL NIGHTLY
Qty: 60 TABLET | Refills: 0 | Status: SHIPPED | OUTPATIENT
Start: 2020-05-15 | End: 2021-05-15

## 2020-05-15 NOTE — PROGRESS NOTES
Established Patient - Audio Only Telehealth Visit     The patient location is: [place]  The chief complaint leading to consultation is: HA, insomnia  Visit type: Virtual visit with audio only (telephone)  Total time spent with patient: 6 min       The reason for the audio only service rather than synchronous audio and video virtual visit was related to technical difficulties or patient preference/necessity.     Each patient to whom I provide medical services by telemedicine is:  (1) informed of the relationship between the physician and patient and the respective role of any other health care provider with respect to management of the patient; and (2) notified that they may decline to receive medical services by telemedicine and may withdraw from such care at any time. Patient verbally consented to receive this service via voice-only telephone call.       HPI: Patient with chronic allergic rhinitis reports frontal HAs, runny nose, and watery eyes.  Seen in the ER several days ago for the same complaint.  Also reports insomnia x 2 days since he left his medication at the hospital.     Assessment and plan:      Diagnoses and all orders for this visit:    Non-seasonal allergic rhinitis, unspecified trigger  -     azelastine (ASTELIN) 137 mcg (0.1 %) nasal spray; 2 sprays (274 mcg total) by Nasal route 2 (two) times daily.    Insomnia, unspecified type  -     QUEtiapine (SEROQUEL) 50 MG tablet; Take 1-2 tablets ( mg total) by mouth nightly.         This service was not originating from a related E/M service provided within the previous 7 days nor will  to an E/M service or procedure within the next 24 hours or my soonest available appointment.  Prevailing standard of care was able to be met in this audio-only visit.

## 2020-08-10 RX ORDER — AMLODIPINE BESYLATE 10 MG/1
10 TABLET ORAL DAILY
Qty: 90 TABLET | Refills: 1 | Status: SHIPPED | OUTPATIENT
Start: 2020-08-10 | End: 2021-08-10

## 2020-08-10 NOTE — TELEPHONE ENCOUNTER
Medication refill was sent to [facility] pharmacy [facility] [place]

## 2020-08-10 NOTE — TELEPHONE ENCOUNTER
----- Message from Lalo Paul sent at 8/10/2020  1:05 PM CDT -----  Regarding: refill  Type: RX Refill Request    Who Called: Otis    Refill or New Rx:refill     RX Name and Strength:amLODIPine (NORVASC) 10 MG tablet,     Is this a 30 day or 90 day Rx:30 day     Preferred Pharmacy with phone number:[facility], [address]      Would the patient rather a call back or a response via [facility]? Call back    Best Call Back Number:[phone]

## 2020-08-11 NOTE — TELEPHONE ENCOUNTER
Trying to notify patient medication was sent to pharmacy , no one answer , left message to check with [facility] .

## 2020-08-13 ENCOUNTER — TELEPHONE (OUTPATIENT)
Dept: FAMILY MEDICINE | Facility: CLINIC | Age: 80
End: 2020-08-13

## 2020-08-13 DIAGNOSIS — I25.10 CORONARY ARTERY DISEASE INVOLVING NATIVE CORONARY ARTERY WITHOUT ANGINA PECTORIS, UNSPECIFIED WHETHER NATIVE OR TRANSPLANTED HEART: ICD-10-CM

## 2020-08-13 RX ORDER — METOPROLOL SUCCINATE 50 MG/1
50 TABLET, EXTENDED RELEASE ORAL DAILY
Qty: 90 TABLET | Refills: 0 | Status: CANCELLED | OUTPATIENT
Start: 2020-08-13

## 2020-08-13 NOTE — TELEPHONE ENCOUNTER
----- Message from Vance Gutierrez sent at 8/13/2020  6:52 AM CDT -----  Type:  RX Refill Request    Who Called: Patient  Refill or New Rx:Refill  RX Name and Strength:metoprolol succinate (TOPROL-XL) 50 MG 24 hr tablet  How is the patient currently taking it? (ex. 1XDay):2xday  Is this a 30 day or 90 day Rx:30 day  Preferred Pharmacy with phone number:[facility] [phone]  Local or Mail Order:Local  Ordering Provider:Jd  Would the patient rather a call back or a response via [facility]? Call Back  Best Call Back Number:[phone]  Additional Information: Pt stated he is out of medication.

## 2020-08-13 NOTE — TELEPHONE ENCOUNTER
Spoke with patient requesting refill for Metoprolol , refill request sent to provider also appoint schedule at these time will be 08/20/20 @ 9:40 am advise to bring all medication bottles , patient verbalized understand .

## 2020-08-14 DIAGNOSIS — Z11.59 NEED FOR HEPATITIS C SCREENING TEST: ICD-10-CM

## 2020-08-27 ENCOUNTER — TELEPHONE (OUTPATIENT)
Dept: FAMILY MEDICINE | Facility: CLINIC | Age: 80
End: 2020-08-27

## 2020-08-27 NOTE — TELEPHONE ENCOUNTER
----- Message from Niki Rosales sent at 8/27/2020 10:35 AM CDT -----  Regarding: Patient Call Back  Contact: Patient  Type: Patient Call Back    Who called: Patient     What is the request in detail: Pt is calling to let the office know that he has more paperwork that he will drop off sometime today.     Can the clinic reply by [facility]?    Would the patient rather a call back or a response via [facility]? Call back     Best call back number:[phone]

## 2020-08-27 NOTE — TELEPHONE ENCOUNTER
Called pt to find out what kind of paperwork he has, pt read the letter and it was just informing him that he needed to schedule and appointment with Dr Miles; informed pt he has OV scheduled for next week and he can bring any paperwork then along with his medication bottles; pt verbalized understanding

## 2020-11-19 DIAGNOSIS — J30.9 ALLERGIC RHINITIS, UNSPECIFIED SEASONALITY, UNSPECIFIED TRIGGER: ICD-10-CM

## 2020-11-19 DIAGNOSIS — R35.0 URINARY FREQUENCY: ICD-10-CM

## 2020-11-19 RX ORDER — TAMSULOSIN HYDROCHLORIDE 0.4 MG/1
0.4 CAPSULE ORAL DAILY
Qty: 90 CAPSULE | Refills: 0 | Status: SHIPPED | OUTPATIENT
Start: 2020-11-19

## 2020-11-19 RX ORDER — LEVOCETIRIZINE DIHYDROCHLORIDE 5 MG/1
5 TABLET, FILM COATED ORAL NIGHTLY
Qty: 90 TABLET | Refills: 0 | Status: SHIPPED | OUTPATIENT
Start: 2020-11-19

## 2020-11-19 NOTE — TELEPHONE ENCOUNTER
Last Office Visit Info:   The patient's last visit with Selma Miles MD was on 5/15/2020.    The patient's last visit in current department was on Visit date not found.        Last CBC Results:   Lab Results   Component Value Date    WBC 6.88 08/04/2019    HGB 11.7 (L) 08/04/2019    HCT 37 01/22/2020     08/04/2019       Last CMP Results  Lab Results   Component Value Date     01/22/2020    K 4.4 01/22/2020     01/22/2020    CO2 20 (L) 01/22/2020    BUN 16 01/22/2020    CREATININE 1.3 01/22/2020    CALCIUM 9.5 01/22/2020    ALBUMIN 4.3 08/04/2019    AST 32 08/04/2019    ALT 22 08/04/2019       Last Lipids  Lab Results   Component Value Date    CHOL 157 05/14/2018    TRIG 72 05/14/2018    HDL 56 05/14/2018    LDLCALC 86.6 05/14/2018       Last A1C  No results found for: HGBA1C    Last TSH  No results found for: TSH      Current Med Refills  Medication List with Changes/Refills   Current Medications    AMLODIPINE (NORVASC) 10 MG TABLET    Take 1 tablet (10 mg total) by mouth once daily.       Start Date: 8/10/2020 End Date: 8/10/2021    ASPIRIN (ECOTRIN) 325 MG EC TABLET    TAKE 1 TABLET (325 MG TOTAL) BY MOUTH ONCE DAILY.       Start Date: 12/15/2017End Date: 2/11/2020    ATORVASTATIN (LIPITOR) 80 MG TABLET    Take 1 tablet (80 mg total) by mouth once daily.       Start Date: 12/12/2019End Date: 12/11/2020    AZELASTINE (ASTELIN) 137 MCG (0.1 %) NASAL SPRAY    2 sprays (274 mcg total) by Nasal route 2 (two) times daily.       Start Date: 5/15/2020 End Date: 5/15/2021    BLOOD PRESSURE MONITOR KIT           Start Date: 8/19/2019 End Date: --    BUPROPION (WELLBUTRIN SR) 200 MG SR12    Take 1 tablet (200 mg total) by mouth 2 (two) times daily.       Start Date: 4/4/2019  End Date: 2/11/2020    CLOPIDOGREL (PLAVIX) 75 MG TABLET    Take 75 mg by mouth once daily.        Start Date: 12/11/2018End Date: --    DEXTRAN 70-HYPROMELLOSE (TEARS) OPHTHALMIC SOLUTION    Place 1 drop into both eyes every 6  (six) hours.       Start Date: 8/4/2019  End Date: --    FLUTICASONE PROPIONATE (FLONASE) 50 MCG/ACTUATION NASAL SPRAY    2 sprays (100 mcg total) by Each Nostril route once daily.       Start Date: 2/5/2020  End Date: --    FUROSEMIDE (LASIX) 40 MG TABLET    Take 1 tablet (40 mg total) by mouth daily       Start Date: 7/23/2019 End Date: --    GABAPENTIN (NEURONTIN) 100 MG CAPSULE    Take 1 capsule (100 mg total) by mouth 3 (three) times daily as needed (leg pain).       Start Date: 12/12/2019End Date: 12/11/2020    GABAPENTIN (NEURONTIN) 100 MG CAPSULE    Take 100 mg by mouth.       Start Date: --        End Date: --    HYDRALAZINE (APRESOLINE) 25 MG TABLET    Take 25 mg by mouth 3 (three) times daily.       Start Date: --        End Date: --    LEVOCETIRIZINE (XYZAL) 5 MG TABLET    TAKE 1 TABLET BY MOUTH EVERY EVENING       Start Date: 1/23/2020 End Date: --    LISINOPRIL (PRINIVIL,ZESTRIL) 40 MG TABLET    Take 40 mg by mouth once daily.       Start Date: 7/9/2019  End Date: --    METOPROLOL SUCCINATE (TOPROL-XL) 50 MG 24 HR TABLET    Take 1 tablet (50 mg total) by mouth once daily.       Start Date: 2/26/2019 End Date: --    QUETIAPINE (SEROQUEL) 100 MG TAB    Take 1 tablet (100 mg total) by mouth every evening.       Start Date: 4/24/2020 End Date: 4/24/2021    QUETIAPINE (SEROQUEL) 50 MG TABLET    Take 1-2 tablets ( mg total) by mouth nightly.       Start Date: 5/15/2020 End Date: 5/15/2021    TAMSULOSIN (FLOMAX) 0.4 MG CAP    Take 1 capsule (0.4 mg total) by mouth once daily.       Start Date: 12/23/2019End Date: --    TIMOLOL MALEATE 0.5% (TIMOPTIC) 0.5 % DROP    Place 1 drop into both eyes 2 (two) times daily.       Start Date: 1/22/2020 End Date: 1/21/2021       Order(s) placed per written order guidelines:     Please advise.

## 2021-01-06 DIAGNOSIS — I10 ESSENTIAL HYPERTENSION, BENIGN: ICD-10-CM

## 2021-02-09 ENCOUNTER — HOSPITAL ENCOUNTER (EMERGENCY)
Facility: HOSPITAL | Age: 81
Discharge: HOME | End: 2021-02-09
Attending: EMERGENCY MEDICINE
Payer: MEDICARE

## 2021-02-09 VITALS
BODY MASS INDEX: 17.88 KG/M2 | OXYGEN SATURATION: 99 % | WEIGHT: 118 LBS | DIASTOLIC BLOOD PRESSURE: 64 MMHG | TEMPERATURE: 98 F | RESPIRATION RATE: 27 BRPM | SYSTOLIC BLOOD PRESSURE: 140 MMHG | HEIGHT: 68 IN | HEART RATE: 63 BPM

## 2021-02-09 DIAGNOSIS — Z91.89 AT RISK FOR MEDICATION NONADHERENCE: Primary | ICD-10-CM

## 2021-02-09 DIAGNOSIS — R53.1 GENERALIZED WEAKNESS: ICD-10-CM

## 2021-02-09 DIAGNOSIS — H53.8 BLURRY VISION, RIGHT EYE: ICD-10-CM

## 2021-02-09 LAB
ALBUMIN SERPL BCP-MCNC: 3.7 G/DL (ref 3.5–5.2)
ALP SERPL-CCNC: 86 U/L (ref 55–135)
ALT SERPL W/O P-5'-P-CCNC: 11 U/L (ref 10–44)
ANION GAP SERPL CALC-SCNC: 10 MMOL/L (ref 8–16)
AST SERPL-CCNC: 22 U/L (ref 10–40)
BACTERIA #/AREA URNS HPF: ABNORMAL /HPF
BASOPHILS # BLD AUTO: 0.02 K/UL (ref 0–0.2)
BASOPHILS NFR BLD: 0.4 % (ref 0–1.9)
BILIRUB SERPL-MCNC: 0.3 MG/DL (ref 0.1–1)
BILIRUB UR QL STRIP: NEGATIVE
BUN SERPL-MCNC: 16 MG/DL (ref 8–23)
CALCIUM SERPL-MCNC: 9.1 MG/DL (ref 8.7–10.5)
CHLORIDE SERPL-SCNC: 105 MMOL/L (ref 95–110)
CLARITY UR: CLEAR
CO2 SERPL-SCNC: 26 MMOL/L (ref 23–29)
COLOR UR: YELLOW
CREAT SERPL-MCNC: 1.5 MG/DL (ref 0.5–1.4)
DIFFERENTIAL METHOD: ABNORMAL
EOSINOPHIL # BLD AUTO: 0.1 K/UL (ref 0–0.5)
EOSINOPHIL NFR BLD: 1 % (ref 0–8)
ERYTHROCYTE [DISTWIDTH] IN BLOOD BY AUTOMATED COUNT: 14.8 % (ref 11.5–14.5)
EST. GFR  (AFRICAN AMERICAN): 51 ML/MIN/1.73 M^2
EST. GFR  (NON AFRICAN AMERICAN): 44 ML/MIN/1.73 M^2
ETHANOL SERPL-MCNC: <10 MG/DL
GLUCOSE SERPL-MCNC: 156 MG/DL (ref 70–110)
GLUCOSE UR QL STRIP: ABNORMAL
HCT VFR BLD AUTO: 36.2 % (ref 40–54)
HGB BLD-MCNC: 11.8 G/DL (ref 14–18)
HGB UR QL STRIP: NEGATIVE
HYALINE CASTS #/AREA URNS LPF: 19 /LPF
IMM GRANULOCYTES # BLD AUTO: 0.01 K/UL (ref 0–0.04)
IMM GRANULOCYTES NFR BLD AUTO: 0.2 % (ref 0–0.5)
KETONES UR QL STRIP: NEGATIVE
LEUKOCYTE ESTERASE UR QL STRIP: ABNORMAL
LYMPHOCYTES # BLD AUTO: 1.3 K/UL (ref 1–4.8)
LYMPHOCYTES NFR BLD: 26.2 % (ref 18–48)
MCH RBC QN AUTO: 31 PG (ref 27–31)
MCHC RBC AUTO-ENTMCNC: 32.6 G/DL (ref 32–36)
MCV RBC AUTO: 95 FL (ref 82–98)
MICROSCOPIC COMMENT: ABNORMAL
MONOCYTES # BLD AUTO: 0.4 K/UL (ref 0.3–1)
MONOCYTES NFR BLD: 8.8 % (ref 4–15)
NEUTROPHILS # BLD AUTO: 3.1 K/UL (ref 1.8–7.7)
NEUTROPHILS NFR BLD: 63.4 % (ref 38–73)
NITRITE UR QL STRIP: NEGATIVE
NRBC BLD-RTO: 0 /100 WBC
PH UR STRIP: 6 [PH] (ref 5–8)
PLATELET # BLD AUTO: 246 K/UL (ref 150–350)
PMV BLD AUTO: 9.8 FL (ref 9.2–12.9)
POTASSIUM SERPL-SCNC: 3.2 MMOL/L (ref 3.5–5.1)
PROT SERPL-MCNC: 7.1 G/DL (ref 6–8.4)
PROT UR QL STRIP: ABNORMAL
RBC # BLD AUTO: 3.81 M/UL (ref 4.6–6.2)
RBC #/AREA URNS HPF: 2 /HPF (ref 0–4)
SODIUM SERPL-SCNC: 141 MMOL/L (ref 136–145)
SP GR UR STRIP: 1.03 (ref 1–1.03)
TROPONIN I SERPL DL<=0.01 NG/ML-MCNC: 0.02 NG/ML (ref 0–0.03)
URN SPEC COLLECT METH UR: ABNORMAL
UROBILINOGEN UR STRIP-ACNC: NEGATIVE EU/DL
WBC # BLD AUTO: 4.89 K/UL (ref 3.9–12.7)
WBC #/AREA URNS HPF: 26 /HPF (ref 0–5)

## 2021-02-09 PROCEDURE — 25000003 PHARM REV CODE 250: Performed by: EMERGENCY MEDICINE

## 2021-02-09 PROCEDURE — 99284 EMERGENCY DEPT VISIT MOD MDM: CPT

## 2021-02-09 PROCEDURE — 85025 COMPLETE CBC W/AUTO DIFF WBC: CPT

## 2021-02-09 PROCEDURE — 87186 SC STD MICRODIL/AGAR DIL: CPT

## 2021-02-09 PROCEDURE — 87086 URINE CULTURE/COLONY COUNT: CPT

## 2021-02-09 PROCEDURE — 80053 COMPREHEN METABOLIC PANEL: CPT

## 2021-02-09 PROCEDURE — 84484 ASSAY OF TROPONIN QUANT: CPT

## 2021-02-09 PROCEDURE — 87077 CULTURE AEROBIC IDENTIFY: CPT

## 2021-02-09 PROCEDURE — 82077 ASSAY SPEC XCP UR&BREATH IA: CPT

## 2021-02-09 PROCEDURE — 87088 URINE BACTERIA CULTURE: CPT

## 2021-02-09 PROCEDURE — 81000 URINALYSIS NONAUTO W/SCOPE: CPT | Mod: 59

## 2021-02-09 RX ORDER — TETRACAINE HYDROCHLORIDE 5 MG/ML
2 SOLUTION OPHTHALMIC
Status: COMPLETED | OUTPATIENT
Start: 2021-02-09 | End: 2021-02-09

## 2021-02-09 RX ADMIN — SODIUM CHLORIDE 500 ML: 0.9 INJECTION, SOLUTION INTRAVENOUS at 06:02

## 2021-02-09 RX ADMIN — TETRACAINE HYDROCHLORIDE 2 DROP: 5 SOLUTION OPHTHALMIC at 04:02

## 2021-02-10 ENCOUNTER — TELEPHONE (OUTPATIENT)
Dept: FAMILY MEDICINE | Facility: CLINIC | Age: 81
End: 2021-02-10

## 2021-02-10 PROCEDURE — G0180 MD CERTIFICATION HHA PATIENT: HCPCS | Mod: ,,, | Performed by: FAMILY MEDICINE

## 2021-02-10 PROCEDURE — G0180 PR HOME HEALTH MD CERTIFICATION: ICD-10-PCS | Mod: ,,, | Performed by: FAMILY MEDICINE

## 2021-02-11 ENCOUNTER — TELEPHONE (OUTPATIENT)
Dept: EMERGENCY MEDICINE | Facility: HOSPITAL | Age: 81
End: 2021-02-11

## 2021-02-11 LAB — BACTERIA UR CULT: ABNORMAL

## 2021-02-11 RX ORDER — NITROFURANTOIN 25; 75 MG/1; MG/1
100 CAPSULE ORAL 2 TIMES DAILY
Qty: 10 CAPSULE | Refills: 0 | Status: SHIPPED | OUTPATIENT
Start: 2021-02-11 | End: 2021-02-16

## 2021-03-10 ENCOUNTER — EXTERNAL HOME HEALTH (OUTPATIENT)
Dept: HOME HEALTH SERVICES | Facility: HOSPITAL | Age: 81
End: 2021-03-10
Payer: MEDICARE

## 2021-03-19 RX ORDER — GABAPENTIN 100 MG/1
100 CAPSULE ORAL 3 TIMES DAILY
Qty: 270 CAPSULE | Refills: 0 | Status: SHIPPED | OUTPATIENT
Start: 2021-03-19

## 2021-03-19 RX ORDER — BUPROPION HYDROCHLORIDE 200 MG/1
200 TABLET, EXTENDED RELEASE ORAL 2 TIMES DAILY
Qty: 180 TABLET | Refills: 0 | Status: SHIPPED | OUTPATIENT
Start: 2021-03-19 | End: 2021-06-17

## 2021-04-26 ENCOUNTER — TELEPHONE (OUTPATIENT)
Dept: FAMILY MEDICINE | Facility: CLINIC | Age: 81
End: 2021-04-26

## 2021-04-28 ENCOUNTER — TELEPHONE (OUTPATIENT)
Dept: FAMILY MEDICINE | Facility: CLINIC | Age: 81
End: 2021-04-28

## 2022-10-31 ENCOUNTER — PATIENT OUTREACH (OUTPATIENT)
Dept: ADMINISTRATIVE | Facility: HOSPITAL | Age: 82
End: 2022-10-31
Payer: MEDICARE

## 2023-01-10 ENCOUNTER — PATIENT OUTREACH (OUTPATIENT)
Dept: ADMINISTRATIVE | Facility: HOSPITAL | Age: 83
End: 2023-01-10
Payer: MEDICARE

## 2023-03-28 ENCOUNTER — PATIENT OUTREACH (OUTPATIENT)
Dept: ADMINISTRATIVE | Facility: HOSPITAL | Age: 83
End: 2023-03-28
Payer: MEDICARE

## 2023-04-13 ENCOUNTER — PATIENT OUTREACH (OUTPATIENT)
Dept: ADMINISTRATIVE | Facility: HOSPITAL | Age: 83
End: 2023-04-13
Payer: MEDICARE